# Patient Record
Sex: MALE | Race: WHITE | Employment: FULL TIME | URBAN - METROPOLITAN AREA
[De-identification: names, ages, dates, MRNs, and addresses within clinical notes are randomized per-mention and may not be internally consistent; named-entity substitution may affect disease eponyms.]

---

## 2017-06-09 ENCOUNTER — OFFICE VISIT (OUTPATIENT)
Dept: FAMILY MEDICINE CLINIC | Age: 57
End: 2017-06-09

## 2017-06-09 VITALS
HEART RATE: 76 BPM | SYSTOLIC BLOOD PRESSURE: 120 MMHG | HEIGHT: 71 IN | BODY MASS INDEX: 31.3 KG/M2 | TEMPERATURE: 98.3 F | DIASTOLIC BLOOD PRESSURE: 88 MMHG | WEIGHT: 223.6 LBS

## 2017-06-09 DIAGNOSIS — I10 ESSENTIAL HYPERTENSION: ICD-10-CM

## 2017-06-09 DIAGNOSIS — I10 ESSENTIAL HYPERTENSION: Primary | ICD-10-CM

## 2017-06-09 DIAGNOSIS — R09.89 PULSE IRREGULARITY: ICD-10-CM

## 2017-06-09 LAB
ANION GAP SERPL CALCULATED.3IONS-SCNC: 18 MMOL/L (ref 3–16)
BUN BLDV-MCNC: 9 MG/DL (ref 7–20)
CALCIUM SERPL-MCNC: 8.6 MG/DL (ref 8.3–10.6)
CHLORIDE BLD-SCNC: 107 MMOL/L (ref 99–110)
CO2: 24 MMOL/L (ref 21–32)
CREAT SERPL-MCNC: 1.1 MG/DL (ref 0.9–1.3)
GFR AFRICAN AMERICAN: >60
GFR NON-AFRICAN AMERICAN: >60
GLUCOSE BLD-MCNC: 95 MG/DL (ref 70–99)
HCT VFR BLD CALC: 43 % (ref 40.5–52.5)
HEMOGLOBIN: 14.1 G/DL (ref 13.5–17.5)
MCH RBC QN AUTO: 27.9 PG (ref 26–34)
MCHC RBC AUTO-ENTMCNC: 32.9 G/DL (ref 31–36)
MCV RBC AUTO: 84.9 FL (ref 80–100)
PDW BLD-RTO: 14.5 % (ref 12.4–15.4)
PLATELET # BLD: 236 K/UL (ref 135–450)
PMV BLD AUTO: 10.5 FL (ref 5–10.5)
POTASSIUM SERPL-SCNC: 4.2 MMOL/L (ref 3.5–5.1)
RBC # BLD: 5.07 M/UL (ref 4.2–5.9)
SODIUM BLD-SCNC: 149 MMOL/L (ref 136–145)
WBC # BLD: 7.1 K/UL (ref 4–11)

## 2017-06-09 PROCEDURE — 99211 OFF/OP EST MAY X REQ PHY/QHP: CPT | Performed by: FAMILY MEDICINE

## 2017-06-09 PROCEDURE — 93000 ELECTROCARDIOGRAM COMPLETE: CPT | Performed by: FAMILY MEDICINE

## 2017-06-12 DIAGNOSIS — E87.6 HYPOKALEMIA: Primary | ICD-10-CM

## 2017-07-14 ENCOUNTER — OFFICE VISIT (OUTPATIENT)
Dept: FAMILY MEDICINE CLINIC | Age: 57
End: 2017-07-14

## 2017-07-14 VITALS
TEMPERATURE: 98.3 F | HEIGHT: 71 IN | DIASTOLIC BLOOD PRESSURE: 82 MMHG | BODY MASS INDEX: 31.5 KG/M2 | SYSTOLIC BLOOD PRESSURE: 130 MMHG | WEIGHT: 225 LBS | HEART RATE: 76 BPM

## 2017-07-14 DIAGNOSIS — E87.6 HYPOKALEMIA: Primary | ICD-10-CM

## 2017-07-14 PROCEDURE — 99211 OFF/OP EST MAY X REQ PHY/QHP: CPT | Performed by: FAMILY MEDICINE

## 2017-11-18 RX ORDER — SPIRONOLACTONE 25 MG/1
TABLET ORAL
Qty: 60 TABLET | Refills: 4 | Status: SHIPPED | OUTPATIENT
Start: 2017-11-18 | End: 2018-08-17 | Stop reason: SDUPTHER

## 2018-01-19 ENCOUNTER — OFFICE VISIT (OUTPATIENT)
Dept: FAMILY MEDICINE CLINIC | Age: 58
End: 2018-01-19

## 2018-01-19 VITALS
BODY MASS INDEX: 31.84 KG/M2 | DIASTOLIC BLOOD PRESSURE: 80 MMHG | WEIGHT: 227.4 LBS | TEMPERATURE: 98.2 F | HEART RATE: 72 BPM | HEIGHT: 71 IN | SYSTOLIC BLOOD PRESSURE: 112 MMHG

## 2018-01-19 DIAGNOSIS — E87.6 HYPOKALEMIA: Primary | ICD-10-CM

## 2018-01-19 DIAGNOSIS — I15.2 HYPERTENSION DUE TO ENDOCRINE DISORDER: ICD-10-CM

## 2018-01-19 PROCEDURE — 99999 PR OFFICE/OUTPT VISIT,PROCEDURE ONLY: CPT | Performed by: FAMILY MEDICINE

## 2018-01-19 ASSESSMENT — ENCOUNTER SYMPTOMS
SHORTNESS OF BREATH: 0
NAUSEA: 0
DIARRHEA: 0
ABDOMINAL DISTENTION: 0
VOMITING: 0
BLOOD IN STOOL: 0
CONSTIPATION: 0
ABDOMINAL PAIN: 0
CHEST TIGHTNESS: 0

## 2018-01-19 NOTE — PROGRESS NOTES
headaches. Objective:   Physical Exam   Constitutional: He appears well-developed and well-nourished. No distress. Cardiovascular: Normal rate, regular rhythm and normal heart sounds. Exam reveals no gallop and no friction rub. No murmur heard. Pulmonary/Chest: Effort normal and breath sounds normal. No accessory muscle usage. No tachypnea. No respiratory distress. He has no decreased breath sounds. He has no wheezes. He has no rhonchi. He has no rales. Abdominal: Soft. Normal appearance and bowel sounds are normal. He exhibits no distension, no abdominal bruit and no mass. There is no hepatosplenomegaly. There is no tenderness. There is no guarding. Lymphadenopathy:     He has no cervical adenopathy. Right: No supraclavicular adenopathy present. Left: No supraclavicular adenopathy present. Neurological: He is alert. Skin: Skin is warm, dry and intact. He is not diaphoretic. No cyanosis. No pallor. Nails show no clubbing. Psychiatric: He has a normal mood and affect. Assessment:       1. Hypokalemia  Basic Metabolic Panel   2.  Hypertension due to endocrine disorder  Lipid Panel    Basic Metabolic Panel       He has no insurance  He has seen renal medicine in past  Hx of adrenal adenoma stable  Overall well  He declined to have the hep c screen      Plan:      See in 7 months  Stay with the medication

## 2018-08-17 ENCOUNTER — OFFICE VISIT (OUTPATIENT)
Dept: FAMILY MEDICINE CLINIC | Age: 58
End: 2018-08-17

## 2018-08-17 VITALS
SYSTOLIC BLOOD PRESSURE: 114 MMHG | TEMPERATURE: 98.2 F | WEIGHT: 228.8 LBS | BODY MASS INDEX: 32.03 KG/M2 | HEIGHT: 71 IN | DIASTOLIC BLOOD PRESSURE: 80 MMHG

## 2018-08-17 DIAGNOSIS — I15.2 HYPERTENSION DUE TO ENDOCRINE DISORDER: Primary | ICD-10-CM

## 2018-08-17 DIAGNOSIS — E87.6 HYPOKALEMIA: ICD-10-CM

## 2018-08-17 DIAGNOSIS — D50.0 IRON DEFICIENCY ANEMIA DUE TO CHRONIC BLOOD LOSS: ICD-10-CM

## 2018-08-17 PROCEDURE — 99999 PR OFFICE/OUTPT VISIT,PROCEDURE ONLY: CPT | Performed by: FAMILY MEDICINE

## 2018-08-17 RX ORDER — SPIRONOLACTONE 25 MG/1
25 TABLET ORAL DAILY
Qty: 30 TABLET | Refills: 5 | Status: SHIPPED | OUTPATIENT
Start: 2018-08-17 | End: 2019-03-14 | Stop reason: SDUPTHER

## 2018-08-17 ASSESSMENT — PATIENT HEALTH QUESTIONNAIRE - PHQ9
2. FEELING DOWN, DEPRESSED OR HOPELESS: 0
SUM OF ALL RESPONSES TO PHQ9 QUESTIONS 1 & 2: 0
1. LITTLE INTEREST OR PLEASURE IN DOING THINGS: 0
SUM OF ALL RESPONSES TO PHQ QUESTIONS 1-9: 0
SUM OF ALL RESPONSES TO PHQ QUESTIONS 1-9: 0

## 2018-08-17 NOTE — PATIENT INSTRUCTIONS
do use the iron tablet daily for 6 weeks  Check the blood count in 2 months  Hold the blood donation for the next 4 months  Decrease the donations to 2 times a year  See in 6 months

## 2019-02-21 ENCOUNTER — OFFICE VISIT (OUTPATIENT)
Dept: FAMILY MEDICINE CLINIC | Age: 59
End: 2019-02-21

## 2019-02-21 ENCOUNTER — TELEPHONE (OUTPATIENT)
Dept: FAMILY MEDICINE CLINIC | Age: 59
End: 2019-02-21

## 2019-02-21 VITALS
HEIGHT: 71 IN | HEART RATE: 72 BPM | TEMPERATURE: 98 F | DIASTOLIC BLOOD PRESSURE: 86 MMHG | WEIGHT: 216 LBS | SYSTOLIC BLOOD PRESSURE: 128 MMHG | BODY MASS INDEX: 30.24 KG/M2

## 2019-02-21 DIAGNOSIS — I15.2 HYPERTENSION DUE TO ENDOCRINE DISORDER: ICD-10-CM

## 2019-02-21 DIAGNOSIS — I15.2 HYPERTENSION DUE TO ENDOCRINE DISORDER: Primary | ICD-10-CM

## 2019-02-21 DIAGNOSIS — J01.00 ACUTE NON-RECURRENT MAXILLARY SINUSITIS: ICD-10-CM

## 2019-02-21 LAB
ANION GAP SERPL CALCULATED.3IONS-SCNC: 15 MMOL/L (ref 3–16)
BASOPHILS ABSOLUTE: 0.1 K/UL (ref 0–0.2)
BASOPHILS RELATIVE PERCENT: 1.8 %
BUN BLDV-MCNC: 11 MG/DL (ref 7–20)
CALCIUM SERPL-MCNC: 8.5 MG/DL (ref 8.3–10.6)
CHLORIDE BLD-SCNC: 104 MMOL/L (ref 99–110)
CO2: 23 MMOL/L (ref 21–32)
CREAT SERPL-MCNC: 1.1 MG/DL (ref 0.9–1.3)
EOSINOPHILS ABSOLUTE: 0.5 K/UL (ref 0–0.6)
EOSINOPHILS RELATIVE PERCENT: 7.6 %
GFR AFRICAN AMERICAN: >60
GFR NON-AFRICAN AMERICAN: >60
GLUCOSE BLD-MCNC: 94 MG/DL (ref 70–99)
HCT VFR BLD CALC: 40.4 % (ref 40.5–52.5)
HEMOGLOBIN: 13.6 G/DL (ref 13.5–17.5)
LYMPHOCYTES ABSOLUTE: 1.7 K/UL (ref 1–5.1)
LYMPHOCYTES RELATIVE PERCENT: 25.9 %
MCH RBC QN AUTO: 28.2 PG (ref 26–34)
MCHC RBC AUTO-ENTMCNC: 33.6 G/DL (ref 31–36)
MCV RBC AUTO: 83.8 FL (ref 80–100)
MONOCYTES ABSOLUTE: 0.6 K/UL (ref 0–1.3)
MONOCYTES RELATIVE PERCENT: 8.4 %
NEUTROPHILS ABSOLUTE: 3.7 K/UL (ref 1.7–7.7)
NEUTROPHILS RELATIVE PERCENT: 56.3 %
PDW BLD-RTO: 14.8 % (ref 12.4–15.4)
PLATELET # BLD: 205 K/UL (ref 135–450)
PMV BLD AUTO: 10.7 FL (ref 5–10.5)
POTASSIUM SERPL-SCNC: 3.5 MMOL/L (ref 3.5–5.1)
RBC # BLD: 4.82 M/UL (ref 4.2–5.9)
SODIUM BLD-SCNC: 142 MMOL/L (ref 136–145)
WBC # BLD: 6.6 K/UL (ref 4–11)

## 2019-02-21 PROCEDURE — 99999 PR OFFICE/OUTPT VISIT,PROCEDURE ONLY: CPT | Performed by: FAMILY MEDICINE

## 2019-02-21 RX ORDER — SULFAMETHOXAZOLE AND TRIMETHOPRIM 400; 80 MG/1; MG/1
1 TABLET ORAL 2 TIMES DAILY
Qty: 20 TABLET | Refills: 0 | Status: SHIPPED | OUTPATIENT
Start: 2019-02-21 | End: 2019-03-03

## 2019-02-21 ASSESSMENT — PATIENT HEALTH QUESTIONNAIRE - PHQ9
1. LITTLE INTEREST OR PLEASURE IN DOING THINGS: 0
SUM OF ALL RESPONSES TO PHQ QUESTIONS 1-9: 0
SUM OF ALL RESPONSES TO PHQ QUESTIONS 1-9: 0
2. FEELING DOWN, DEPRESSED OR HOPELESS: 0
SUM OF ALL RESPONSES TO PHQ9 QUESTIONS 1 & 2: 0

## 2019-03-14 RX ORDER — SPIRONOLACTONE 25 MG/1
TABLET ORAL
Qty: 30 TABLET | Refills: 5 | Status: SHIPPED | OUTPATIENT
Start: 2019-03-14 | End: 2019-09-23 | Stop reason: SDUPTHER

## 2019-08-23 ENCOUNTER — OFFICE VISIT (OUTPATIENT)
Dept: FAMILY MEDICINE CLINIC | Age: 59
End: 2019-08-23

## 2019-08-23 VITALS
HEART RATE: 64 BPM | DIASTOLIC BLOOD PRESSURE: 68 MMHG | HEIGHT: 71 IN | WEIGHT: 218 LBS | TEMPERATURE: 98.3 F | BODY MASS INDEX: 30.52 KG/M2 | SYSTOLIC BLOOD PRESSURE: 112 MMHG

## 2019-08-23 DIAGNOSIS — I15.2 HYPERTENSION DUE TO ENDOCRINE DISORDER: Primary | ICD-10-CM

## 2019-08-23 PROCEDURE — 99999 PR OFFICE/OUTPT VISIT,PROCEDURE ONLY: CPT | Performed by: FAMILY MEDICINE

## 2019-08-23 ASSESSMENT — ENCOUNTER SYMPTOMS
DIARRHEA: 0
VOMITING: 0
ABDOMINAL DISTENTION: 0
SHORTNESS OF BREATH: 0
BLOOD IN STOOL: 0
CHEST TIGHTNESS: 0
ABDOMINAL PAIN: 0
NAUSEA: 0
CONSTIPATION: 0

## 2019-08-23 NOTE — PROGRESS NOTES
Physical Exam   Constitutional: He appears well-developed and well-nourished. No distress. Eyes: No scleral icterus. Neck: Neck supple. No thyroid mass and no thyromegaly present. Cardiovascular: Normal rate, regular rhythm and normal heart sounds. Exam reveals no gallop and no friction rub. No murmur heard. Pulmonary/Chest: Effort normal and breath sounds normal. No accessory muscle usage. No tachypnea. No respiratory distress. He has no decreased breath sounds. He has no wheezes. He has no rhonchi. He has no rales. Abdominal: Soft. Normal appearance and bowel sounds are normal. He exhibits no distension, no abdominal bruit, no pulsatile midline mass and no mass. There is no hepatosplenomegaly. There is no tenderness. There is no rigidity and no guarding. Lymphadenopathy:     He has no cervical adenopathy. Right: No supraclavicular adenopathy present. Left: No supraclavicular adenopathy present. Neurological: He is alert. He displays no tremor. Skin: Skin is warm, dry and intact. He is not diaphoretic. No cyanosis. No pallor. Nails show no clubbing. Psychiatric: He has a normal mood and affect. His speech is normal.       Assessment:        Diagnosis Orders   1.  Hypertension due to endocrine disorder  CBC Auto Differential    Comprehensive Metabolic Panel    Lipid Panel      no insurance  Adrenal adenoma stable   Overall stable  He declined to be evaluated for sleep apnea  Reviewed again with him and he again declined prostate and colon screen      Plan:      Stay with the medicine  Continue to watch the diet   See in 6-7 months        Frandy Domingo MD

## 2019-09-23 RX ORDER — SPIRONOLACTONE 25 MG/1
TABLET ORAL
Qty: 30 TABLET | Refills: 5 | Status: SHIPPED | OUTPATIENT
Start: 2019-09-23 | End: 2020-03-30

## 2020-03-18 DIAGNOSIS — I15.2 HYPERTENSION DUE TO ENDOCRINE DISORDER: ICD-10-CM

## 2020-03-18 LAB
A/G RATIO: 1.4 (ref 1.1–2.2)
ALBUMIN SERPL-MCNC: 4.2 G/DL (ref 3.4–5)
ALP BLD-CCNC: 91 U/L (ref 40–129)
ALT SERPL-CCNC: 18 U/L (ref 10–40)
ANION GAP SERPL CALCULATED.3IONS-SCNC: 13 MMOL/L (ref 3–16)
AST SERPL-CCNC: 19 U/L (ref 15–37)
BASOPHILS ABSOLUTE: 0.1 K/UL (ref 0–0.2)
BASOPHILS RELATIVE PERCENT: 1.1 %
BILIRUB SERPL-MCNC: 0.6 MG/DL (ref 0–1)
BUN BLDV-MCNC: 8 MG/DL (ref 7–20)
CALCIUM SERPL-MCNC: 9.1 MG/DL (ref 8.3–10.6)
CHLORIDE BLD-SCNC: 105 MMOL/L (ref 99–110)
CHOLESTEROL, TOTAL: 119 MG/DL (ref 0–199)
CO2: 28 MMOL/L (ref 21–32)
CREAT SERPL-MCNC: 1 MG/DL (ref 0.9–1.3)
EOSINOPHILS ABSOLUTE: 0.1 K/UL (ref 0–0.6)
EOSINOPHILS RELATIVE PERCENT: 0.9 %
GFR AFRICAN AMERICAN: >60
GFR NON-AFRICAN AMERICAN: >60
GLOBULIN: 3 G/DL
GLUCOSE BLD-MCNC: 89 MG/DL (ref 70–99)
HCT VFR BLD CALC: 43.7 % (ref 40.5–52.5)
HDLC SERPL-MCNC: 32 MG/DL (ref 40–60)
HEMOGLOBIN: 14.8 G/DL (ref 13.5–17.5)
LDL CHOLESTEROL CALCULATED: 73 MG/DL
LYMPHOCYTES ABSOLUTE: 1.5 K/UL (ref 1–5.1)
LYMPHOCYTES RELATIVE PERCENT: 20 %
MCH RBC QN AUTO: 28.2 PG (ref 26–34)
MCHC RBC AUTO-ENTMCNC: 33.9 G/DL (ref 31–36)
MCV RBC AUTO: 83.3 FL (ref 80–100)
MONOCYTES ABSOLUTE: 0.6 K/UL (ref 0–1.3)
MONOCYTES RELATIVE PERCENT: 8.2 %
NEUTROPHILS ABSOLUTE: 5.1 K/UL (ref 1.7–7.7)
NEUTROPHILS RELATIVE PERCENT: 69.8 %
PDW BLD-RTO: 15 % (ref 12.4–15.4)
PLATELET # BLD: 192 K/UL (ref 135–450)
PLATELET SLIDE REVIEW: ADEQUATE
PMV BLD AUTO: 10.7 FL (ref 5–10.5)
POTASSIUM SERPL-SCNC: 3.8 MMOL/L (ref 3.5–5.1)
RBC # BLD: 5.24 M/UL (ref 4.2–5.9)
SODIUM BLD-SCNC: 146 MMOL/L (ref 136–145)
TOTAL PROTEIN: 7.2 G/DL (ref 6.4–8.2)
TRIGL SERPL-MCNC: 68 MG/DL (ref 0–150)
VLDLC SERPL CALC-MCNC: 14 MG/DL
WBC # BLD: 7.3 K/UL (ref 4–11)

## 2020-03-23 ENCOUNTER — OFFICE VISIT (OUTPATIENT)
Dept: FAMILY MEDICINE CLINIC | Age: 60
End: 2020-03-23

## 2020-03-23 VITALS
TEMPERATURE: 97.9 F | HEART RATE: 76 BPM | DIASTOLIC BLOOD PRESSURE: 84 MMHG | SYSTOLIC BLOOD PRESSURE: 128 MMHG | WEIGHT: 210 LBS | HEIGHT: 71 IN | BODY MASS INDEX: 29.4 KG/M2

## 2020-03-23 PROCEDURE — 93000 ELECTROCARDIOGRAM COMPLETE: CPT | Performed by: FAMILY MEDICINE

## 2020-03-23 PROCEDURE — 99211 OFF/OP EST MAY X REQ PHY/QHP: CPT | Performed by: FAMILY MEDICINE

## 2020-03-23 ASSESSMENT — PATIENT HEALTH QUESTIONNAIRE - PHQ9
1. LITTLE INTEREST OR PLEASURE IN DOING THINGS: 0
2. FEELING DOWN, DEPRESSED OR HOPELESS: 0
SUM OF ALL RESPONSES TO PHQ QUESTIONS 1-9: 0
SUM OF ALL RESPONSES TO PHQ QUESTIONS 1-9: 0
SUM OF ALL RESPONSES TO PHQ9 QUESTIONS 1 & 2: 0

## 2020-03-23 NOTE — PATIENT INSTRUCTIONS
Continue to use the medicine  If this changes or more severe go to the ER  Call me in about 2 weeks  See in late summer

## 2020-03-23 NOTE — PROGRESS NOTES
Subjective:      Patient ID: Dewayne Melgar is a 62 y.o. male. Patient presents with:  Hypertension    He is ok  Will get occ flutter in chest and some left arm numbness for number of months since the last visit  With or without activity. Every 3 days but has gotten better over last 10 days  He had been taking a lot of caffeine and that seems to have helped  No cp no sob  No gerd no heartburn  No dysphagia no cough    No abdomen pain   On same meds  He is well today    Brother did not have cad. The doctors told them a viral infection of the heart      YOB: 1960    Date of Visit:  3/23/2020     -- Pcn ([facility])     Current Outpatient Medications:  spironolactone (ALDACTONE) 25 MG tablet, TAKE ONE TABLET BY MOUTH DAILY, Disp: 30 tablet, Rfl: 5    No current facility-administered medications for this visit.       ---------------------------               03/23/20                      1338         ---------------------------   BP:          128/84         Site:    Left Upper Arm     Position:     Sitting        Cuff Size:   Large Adult      Pulse:         76           Temp:   97.9 °F (36.6 °C)   TempSrc:      Oral          Weight: 210 lb (95.3 kg)    Height: 5' 11\" (1.803 m)   ---------------------------  Body mass index is 29.29 kg/m². Wt Readings from Last 3 Encounters:  Trying to lose weight   03/23/20 : 210 lb (95.3 kg)  08/23/19 : 218 lb (98.9 kg)  02/21/19 : 216 lb (98 kg)    BP Readings from Last 3 Encounters:  03/23/20 : 128/84  08/23/19 : 112/68  02/21/19 : 128/86        Review of Systems    Objective:   Physical Exam  Constitutional:       General: He is not in acute distress. Appearance: Normal appearance. He is well-developed. He is not ill-appearing or diaphoretic. Cardiovascular:      Rate and Rhythm: Normal rate and regular rhythm. Heart sounds: Normal heart sounds. No murmur. No friction rub. No gallop.        Comments: Pulmonary:      Effort: Pulmonary effort is normal. No tachypnea, accessory muscle usage or respiratory distress. Breath sounds: Normal breath sounds. No decreased breath sounds, wheezing, rhonchi or rales. Abdominal:      General: Bowel sounds are normal. There is no distension or abdominal bruit. Palpations: Abdomen is soft. There is no hepatomegaly, splenomegaly, mass or pulsatile mass. Tenderness: There is no abdominal tenderness. There is no guarding. Lymphadenopathy:      Cervical: No cervical adenopathy. Upper Body:      Right upper body: No supraclavicular adenopathy. Left upper body: No supraclavicular adenopathy. Skin:     General: Skin is warm and dry. Coloration: Skin is not pale. Nails: There is no clubbing. Neurological:      Mental Status: He is alert. Assessment:        Diagnosis Orders   1. Hypertension due to endocrine disorder  EKG 12 lead   2. Chest pain, unspecified type  EKG 12 lead       He is well now  ekg ok no ischemia and no change from earlier  sr  Atypical chest pain.  He declined to have a cardiac referral and check up      Plan:      Continue to use the medicine  If this changes or more severe go to the ER  Call me in about 2 weeks  See in late summer         Sharda Chong MD

## 2020-03-30 RX ORDER — SPIRONOLACTONE 25 MG/1
TABLET ORAL
Qty: 30 TABLET | Refills: 4 | Status: SHIPPED | OUTPATIENT
Start: 2020-03-30 | End: 2020-09-11

## 2020-08-24 ENCOUNTER — OFFICE VISIT (OUTPATIENT)
Dept: FAMILY MEDICINE CLINIC | Age: 60
End: 2020-08-24

## 2020-08-24 VITALS
SYSTOLIC BLOOD PRESSURE: 132 MMHG | TEMPERATURE: 98.1 F | DIASTOLIC BLOOD PRESSURE: 86 MMHG | BODY MASS INDEX: 29.26 KG/M2 | HEART RATE: 60 BPM | HEIGHT: 71 IN | WEIGHT: 209 LBS

## 2020-08-24 PROCEDURE — 99211 OFF/OP EST MAY X REQ PHY/QHP: CPT | Performed by: FAMILY MEDICINE

## 2020-08-24 RX ORDER — ASPIRIN 81 MG/1
81 TABLET ORAL DAILY
Status: ON HOLD | COMMUNITY
End: 2021-11-14 | Stop reason: ALTCHOICE

## 2020-08-24 NOTE — PATIENT INSTRUCTIONS
Continue the medicine  If the symptoms do not clear call us   Keep the house ventilated  Carefully clean the areas of mold   See in 12 months

## 2020-08-24 NOTE — PROGRESS NOTES
Subjective:      Patient ID: Yocasta Pastrana is a 62 y.o. male. Patient presents with:  6 Month Follow-Up: hypertension    He is well and really no c/o  Feels well  occ cough or wheeze about 3 week  Seems to be getting better  He thinks it could be allergy. He does have some mold issues in the home    No fever no sob no cp  No abdomen pain   bm and urine doing well no sx  No ha    YOB: 1960    Date of Visit:  8/24/2020     -- Pcn ([facility])     Current Outpatient Medications:  aspirin 81 MG EC tablet, Take 81 mg by mouth daily, Disp: , Rfl:   spironolactone (ALDACTONE) 25 MG tablet, TAKE ONE TABLET BY MOUTH DAILY, Disp: 30 tablet, Rfl: 4    No current facility-administered medications for this visit.       ---------------------------               08/24/20                      1546         ---------------------------   BP:          132/86         Site:    Left Upper Arm     Position:     Sitting        Cuff Size:   Large Adult      Pulse:         60           Temp:   98.1 °F (36.7 °C)   TempSrc:    Temporal        Weight: 209 lb (94.8 kg)    Height: 5' 11\" (1.803 m)   ---------------------------  Body mass index is 29.15 kg/m². Wt Readings from Last 3 Encounters:  08/24/20 : 209 lb (94.8 kg)  03/23/20 : 210 lb (95.3 kg)  08/23/19 : 218 lb (98.9 kg)    BP Readings from Last 3 Encounters:  08/24/20 : 132/86  03/23/20 : 128/84  08/23/19 : 112/68          Review of Systems    Objective:   Physical Exam  Constitutional:       General: He is not in acute distress. Appearance: Normal appearance. He is well-developed. He is not ill-appearing or diaphoretic. HENT:      Head: Normocephalic and atraumatic. Mouth/Throat:      Lips: Pink. Mouth: Mucous membranes are moist.      Pharynx: Oropharynx is clear. Uvula midline. Eyes:      General: Lids are normal.      Extraocular Movements: Extraocular movements intact.    Neck:      Musculoskeletal: Neck supple. Thyroid: No thyroid mass or thyromegaly. Cardiovascular:      Rate and Rhythm: Normal rate and regular rhythm. Heart sounds: Normal heart sounds. No murmur. No friction rub. No gallop. Comments:     Pulmonary:      Effort: Pulmonary effort is normal. No tachypnea, accessory muscle usage or respiratory distress. Breath sounds: Normal breath sounds. No decreased breath sounds, wheezing, rhonchi or rales. Abdominal:      General: Bowel sounds are normal. There is no distension or abdominal bruit. Palpations: Abdomen is soft. There is no hepatomegaly, splenomegaly, mass or pulsatile mass. Tenderness: There is no abdominal tenderness. There is no guarding. Lymphadenopathy:      Cervical: No cervical adenopathy. Upper Body:      Right upper body: No supraclavicular adenopathy. Left upper body: No supraclavicular adenopathy. Skin:     General: Skin is warm and dry. Coloration: Skin is not pale. Nails: There is no clubbing. Neurological:      Mental Status: He is alert. Assessment:        Diagnosis Orders   1.  Hypertension due to endocrine disorder         He did not want to come in sooner       Plan:      Continue the medicine  If the symptoms do not clear call us   Keep the house ventilated  Carefully clean the areas of mold   See in 12 months         Ash Poole MD

## 2020-09-11 RX ORDER — SPIRONOLACTONE 25 MG/1
TABLET ORAL
Qty: 30 TABLET | Refills: 5 | Status: SHIPPED | OUTPATIENT
Start: 2020-09-11 | End: 2021-03-22

## 2021-03-22 RX ORDER — SPIRONOLACTONE 25 MG/1
TABLET ORAL
Qty: 30 TABLET | Refills: 4 | Status: SHIPPED | OUTPATIENT
Start: 2021-03-22 | End: 2021-06-14 | Stop reason: SDUPTHER

## 2021-06-14 ENCOUNTER — OFFICE VISIT (OUTPATIENT)
Dept: FAMILY MEDICINE CLINIC | Age: 61
End: 2021-06-14

## 2021-06-14 VITALS
SYSTOLIC BLOOD PRESSURE: 138 MMHG | DIASTOLIC BLOOD PRESSURE: 88 MMHG | HEART RATE: 88 BPM | WEIGHT: 210 LBS | HEIGHT: 71 IN | BODY MASS INDEX: 29.4 KG/M2 | TEMPERATURE: 97.8 F

## 2021-06-14 DIAGNOSIS — I15.2 HYPERTENSION DUE TO ENDOCRINE DISORDER: Primary | ICD-10-CM

## 2021-06-14 DIAGNOSIS — I15.2 HYPERTENSION DUE TO ENDOCRINE DISORDER: ICD-10-CM

## 2021-06-14 LAB
ANION GAP SERPL CALCULATED.3IONS-SCNC: 9 MMOL/L (ref 3–16)
BUN BLDV-MCNC: 9 MG/DL (ref 7–20)
CALCIUM SERPL-MCNC: 8.8 MG/DL (ref 8.3–10.6)
CHLORIDE BLD-SCNC: 108 MMOL/L (ref 99–110)
CO2: 29 MMOL/L (ref 21–32)
CREAT SERPL-MCNC: 1.1 MG/DL (ref 0.8–1.3)
GFR AFRICAN AMERICAN: >60
GFR NON-AFRICAN AMERICAN: >60
GLUCOSE BLD-MCNC: 129 MG/DL (ref 70–99)
POTASSIUM SERPL-SCNC: 3.2 MMOL/L (ref 3.5–5.1)
SODIUM BLD-SCNC: 146 MMOL/L (ref 136–145)

## 2021-06-14 PROCEDURE — 99999 PR OFFICE/OUTPT VISIT,PROCEDURE ONLY: CPT | Performed by: FAMILY MEDICINE

## 2021-06-14 RX ORDER — SPIRONOLACTONE 25 MG/1
25 TABLET ORAL DAILY
Qty: 90 TABLET | Refills: 2 | Status: ON HOLD
Start: 2021-06-14 | End: 2021-11-22 | Stop reason: HOSPADM

## 2021-06-14 RX ORDER — SPIRONOLACTONE 25 MG/1
25 TABLET ORAL DAILY
Qty: 30 TABLET | Refills: 5 | Status: SHIPPED | OUTPATIENT
Start: 2021-06-14 | End: 2021-06-14

## 2021-06-14 SDOH — ECONOMIC STABILITY: FOOD INSECURITY: WITHIN THE PAST 12 MONTHS, YOU WORRIED THAT YOUR FOOD WOULD RUN OUT BEFORE YOU GOT MONEY TO BUY MORE.: NEVER TRUE

## 2021-06-14 SDOH — ECONOMIC STABILITY: FOOD INSECURITY: WITHIN THE PAST 12 MONTHS, THE FOOD YOU BOUGHT JUST DIDN'T LAST AND YOU DIDN'T HAVE MONEY TO GET MORE.: NEVER TRUE

## 2021-06-14 ASSESSMENT — SOCIAL DETERMINANTS OF HEALTH (SDOH): HOW HARD IS IT FOR YOU TO PAY FOR THE VERY BASICS LIKE FOOD, HOUSING, MEDICAL CARE, AND HEATING?: NOT HARD AT ALL

## 2021-06-14 ASSESSMENT — ENCOUNTER SYMPTOMS
COUGH: 0
DIARRHEA: 0
CHEST TIGHTNESS: 0
ABDOMINAL PAIN: 0
CONSTIPATION: 0
ABDOMINAL DISTENTION: 0
BLOOD IN STOOL: 0
VOMITING: 0
NAUSEA: 0
SHORTNESS OF BREATH: 0

## 2021-06-14 ASSESSMENT — PATIENT HEALTH QUESTIONNAIRE - PHQ9
SUM OF ALL RESPONSES TO PHQ QUESTIONS 1-9: 0
SUM OF ALL RESPONSES TO PHQ9 QUESTIONS 1 & 2: 0
2. FEELING DOWN, DEPRESSED OR HOPELESS: 0
1. LITTLE INTEREST OR PLEASURE IN DOING THINGS: 0

## 2021-06-14 NOTE — PROGRESS NOTES
Subjective:      Patient ID: Zeke Emerson is a 62 y.o. male. Chief Complaint   Patient presents with   Zenaida Johnson     hypertension - pt is not fasting        Patient presents with:  Check-Up: hypertension - pt is not fasting    He is well and here for the above  He has no c/o  meds the same    YOB: 1960    Date of Visit:  6/14/2021     -- Pcn ([facility])     Current Outpatient Medications:  spironolactone (ALDACTONE) 25 MG tablet, TAKE ONE TABLET BY MOUTH DAILY, Disp: 30 tablet, Rfl: 4  aspirin 81 MG EC tablet, Take 81 mg by mouth daily (Patient not taking: Reported on 6/14/2021), Disp: , Rfl:     No current facility-administered medications for this visit.      ---------------------------               06/14/21                      0929         ---------------------------   BP:          138/88         Site:    Left Upper Arm     Position:     Sitting        Cuff Size:   Large Adult      Pulse:         88           Temp:   97.8 °F (36.6 °C)   TempSrc:    Temporal        Weight: 210 lb (95.3 kg)    Height: 5' 11\" (1.803 m)   ---------------------------  Body mass index is 29.29 kg/m². Wt Readings from Last 3 Encounters:  06/14/21 : 210 lb (95.3 kg)  08/24/20 : 209 lb (94.8 kg)  03/23/20 : 210 lb (95.3 kg)    BP Readings from Last 3 Encounters:  06/14/21 : 138/88  08/24/20 : 132/86  03/23/20 : 128/84          Review of Systems   Constitutional: Negative for appetite change, chills, fever and unexpected weight change. Respiratory: Negative for cough, chest tightness and shortness of breath. Cardiovascular: Negative for chest pain, palpitations and leg swelling. Gastrointestinal: Negative for abdominal distention, abdominal pain, blood in stool, constipation, diarrhea, nausea and vomiting. Genitourinary: Negative for difficulty urinating, dysuria and hematuria. Neurological: Negative for headaches.        Objective:   Physical Exam Constitutional:       General: He is not in acute distress. Appearance: Normal appearance. He is well-developed. He is not ill-appearing or diaphoretic. Eyes:      General: No scleral icterus. Neck:      Thyroid: No thyroid mass or thyromegaly. Cardiovascular:      Rate and Rhythm: Normal rate and regular rhythm. Heart sounds: Normal heart sounds. No murmur heard. No friction rub. No gallop. Pulmonary:      Effort: Pulmonary effort is normal. No tachypnea, accessory muscle usage or respiratory distress. Breath sounds: Normal breath sounds. No decreased breath sounds, wheezing, rhonchi or rales. Musculoskeletal:      Cervical back: Neck supple. Lymphadenopathy:      Cervical: No cervical adenopathy. Upper Body:      Right upper body: No supraclavicular adenopathy. Left upper body: No supraclavicular adenopathy. Skin:     General: Skin is warm and dry. Coloration: Skin is not pale. Neurological:      Mental Status: He is alert. Assessment:       Diagnosis Orders   1.  Hypertension due to endocrine disorder  Basic Metabolic Panel     Orders Placed This Encounter   Medications    DISCONTD: spironolactone (ALDACTONE) 25 MG tablet     Sig: Take 1 tablet by mouth daily     Dispense:  30 tablet     Refill:  5    spironolactone (ALDACTONE) 25 MG tablet     Sig: Take 1 tablet by mouth daily     Dispense:  90 tablet     Refill:  2     Corrected amount     Printed off [employer] coupon for him  He has no insurance       Plan:      Continue the medicine  See in a year        Wilma Patrick MD

## 2021-06-16 DIAGNOSIS — E87.6 LOW BLOOD POTASSIUM: Primary | ICD-10-CM

## 2021-06-16 RX ORDER — POTASSIUM CHLORIDE 750 MG/1
10 TABLET, EXTENDED RELEASE ORAL DAILY
Qty: 10 TABLET | Refills: 0 | Status: ON HOLD | OUTPATIENT
Start: 2021-06-16 | End: 2021-11-14

## 2021-06-29 DIAGNOSIS — E87.6 LOW BLOOD POTASSIUM: ICD-10-CM

## 2021-06-29 LAB — POTASSIUM SERPL-SCNC: 4.3 MMOL/L (ref 3.5–5.1)

## 2021-07-13 ENCOUNTER — OFFICE VISIT (OUTPATIENT)
Dept: FAMILY MEDICINE CLINIC | Age: 61
End: 2021-07-13

## 2021-07-13 VITALS
HEIGHT: 71 IN | BODY MASS INDEX: 30.8 KG/M2 | WEIGHT: 220 LBS | SYSTOLIC BLOOD PRESSURE: 128 MMHG | TEMPERATURE: 99.9 F | DIASTOLIC BLOOD PRESSURE: 82 MMHG

## 2021-07-13 DIAGNOSIS — T50.905A ADVERSE EFFECT OF DRUG, INITIAL ENCOUNTER: Primary | ICD-10-CM

## 2021-07-13 PROCEDURE — 99999 PR OFFICE/OUTPT VISIT,PROCEDURE ONLY: CPT | Performed by: FAMILY MEDICINE

## 2021-07-13 RX ORDER — METHYLPREDNISOLONE 4 MG/1
TABLET ORAL
Qty: 1 KIT | Refills: 0 | Status: SHIPPED | OUTPATIENT
Start: 2021-07-13 | End: 2021-07-19

## 2021-07-13 NOTE — PROGRESS NOTES
Subjective:      Patient ID: Berline Krabbe is a 62 y.o. male. Chief Complaint   Patient presents with    Rash     itchy red rash all over body x 5 days - had tooth pulled - was taking Amox        Patient presents with:  Rash: itchy red rash all over body x 5 days - had tooth pulled - was taking Amox    Reaction to pcn with rash  Only took one tablet 7 hours developed rash in groin and thighs  Rash slowly worse  Antibiotic changed to clindamycin. Started Friday   Itch     YOB: 1960    Date of Visit:  7/13/2021     -- Pcn ([facility])     Current Outpatient Medications:  spironolactone (ALDACTONE) 25 MG tablet, Take 1 tablet by mouth daily, Disp: 90 tablet, Rfl: 2  aspirin 81 MG EC tablet, Take 81 mg by mouth daily , Disp: , Rfl:   potassium chloride (KLOR-CON M) 10 MEQ extended release tablet, Take 1 tablet by mouth daily for 10 days, Disp: 10 tablet, Rfl: 0    No current facility-administered medications for this visit.      ---------------------------               07/13/21                      1638         ---------------------------   BP:          128/82         Site:    Left Upper Arm     Position:     Sitting        Cuff Size:   Large Adult      Temp:   99.9 °F (37.7 °C)   TempSrc:    Temporal        Weight: 220 lb (99.8 kg)    Height: 5' 11\" (1.803 m)   ---------------------------  Body mass index is 30.68 kg/m². Wt Readings from Last 3 Encounters:  07/13/21 : 220 lb (99.8 kg)  06/14/21 : 210 lb (95.3 kg)  08/24/20 : 209 lb (94.8 kg)    BP Readings from Last 3 Encounters:  07/13/21 : 128/82  06/14/21 : 138/88  08/24/20 : 132/86        Review of Systems    Objective:   Physical Exam  Constitutional:       General: He is not in acute distress. Appearance: Normal appearance. He is not ill-appearing. Pulmonary:      Effort: Pulmonary effort is normal.      Breath sounds: Normal breath sounds. No decreased breath sounds, wheezing, rhonchi or rales. Skin:     General: Skin is warm and dry. Coloration: Skin is not pale. Comments: Confluent and several isolated red maculo papular rash on trunk,  proximal thighs,  and also arms and hands face spared   Neurological:      Mental Status: He is alert. Assessment:       Diagnosis Orders   1. Adverse effect of drug, initial encounter       Orders Placed This Encounter   Medications    methylPREDNISolone (MEDROL DOSEPACK) 4 MG tablet     Sig: Take by mouth.      Dispense:  1 kit     Refill:  0       Reaction to pcn /amoxicillin       Plan:      Take benadryl 25 mg every 6-8 hours  Take the steroid   With food  If worse to the ER   Take fluids          Radha Ulloa MD

## 2021-07-17 ENCOUNTER — HOSPITAL ENCOUNTER (EMERGENCY)
Age: 61
Discharge: HOME | End: 2021-07-17
Attending: EMERGENCY MEDICINE

## 2021-07-17 ENCOUNTER — APPOINTMENT (OUTPATIENT)
Dept: GENERAL RADIOLOGY | Age: 61
End: 2021-07-17

## 2021-07-17 VITALS
HEIGHT: 71 IN | HEART RATE: 102 BPM | DIASTOLIC BLOOD PRESSURE: 86 MMHG | BODY MASS INDEX: 30.37 KG/M2 | OXYGEN SATURATION: 96 % | TEMPERATURE: 99.4 F | RESPIRATION RATE: 24 BRPM | SYSTOLIC BLOOD PRESSURE: 148 MMHG | WEIGHT: 216.93 LBS

## 2021-07-17 DIAGNOSIS — L27.0 DRUG ERUPTION: Primary | ICD-10-CM

## 2021-07-17 LAB
ANION GAP SERPL CALCULATED.3IONS-SCNC: 10 MMOL/L (ref 3–16)
BASOPHILS ABSOLUTE: 0 K/UL (ref 0–0.2)
BASOPHILS RELATIVE PERCENT: 0 %
BILIRUBIN URINE: NEGATIVE
BLOOD, URINE: NEGATIVE
BUN BLDV-MCNC: 8 MG/DL (ref 7–20)
C-REACTIVE PROTEIN: 48 MG/L (ref 0–5.1)
CALCIUM SERPL-MCNC: 8.6 MG/DL (ref 8.3–10.6)
CHLORIDE BLD-SCNC: 102 MMOL/L (ref 99–110)
CLARITY: CLEAR
CO2: 26 MMOL/L (ref 21–32)
COLOR: YELLOW
CREAT SERPL-MCNC: 1.1 MG/DL (ref 0.8–1.3)
EOSINOPHILS ABSOLUTE: 0.3 K/UL (ref 0–0.6)
EOSINOPHILS RELATIVE PERCENT: 2 %
GFR AFRICAN AMERICAN: >60
GFR NON-AFRICAN AMERICAN: >60
GLUCOSE BLD-MCNC: 82 MG/DL (ref 70–99)
GLUCOSE URINE: NEGATIVE MG/DL
HCT VFR BLD CALC: 43.4 % (ref 40.5–52.5)
HEMATOLOGY PATH CONSULT: YES
HEMOGLOBIN: 14.4 G/DL (ref 13.5–17.5)
KETONES, URINE: NEGATIVE MG/DL
LEUKOCYTE ESTERASE, URINE: NEGATIVE
LYMPHOCYTES ABSOLUTE: 0.8 K/UL (ref 1–5.1)
LYMPHOCYTES RELATIVE PERCENT: 5 %
MCH RBC QN AUTO: 28.5 PG (ref 26–34)
MCHC RBC AUTO-ENTMCNC: 33.2 G/DL (ref 31–36)
MCV RBC AUTO: 85.9 FL (ref 80–100)
MICROSCOPIC EXAMINATION: NORMAL
MONOCYTES ABSOLUTE: 2.1 K/UL (ref 0–1.3)
MONOCYTES RELATIVE PERCENT: 14 %
NEUTROPHILS ABSOLUTE: 12 K/UL (ref 1.7–7.7)
NEUTROPHILS RELATIVE PERCENT: 79 %
NITRITE, URINE: NEGATIVE
PDW BLD-RTO: 14.6 % (ref 12.4–15.4)
PH UA: 6 (ref 5–8)
PLATELET # BLD: 336 K/UL (ref 135–450)
PMV BLD AUTO: 9 FL (ref 5–10.5)
POTASSIUM REFLEX MAGNESIUM: 3.6 MMOL/L (ref 3.5–5.1)
PROTEIN UA: NEGATIVE MG/DL
RBC # BLD: 5.05 M/UL (ref 4.2–5.9)
RBC # BLD: NORMAL 10*6/UL
SEDIMENTATION RATE, ERYTHROCYTE: 18 MM/HR (ref 0–20)
SODIUM BLD-SCNC: 138 MMOL/L (ref 136–145)
SPECIFIC GRAVITY UA: 1.01 (ref 1–1.03)
URINE REFLEX TO CULTURE: NORMAL
URINE TYPE: NORMAL
UROBILINOGEN, URINE: 0.2 E.U./DL
WBC # BLD: 15.2 K/UL (ref 4–11)

## 2021-07-17 PROCEDURE — 85025 COMPLETE CBC W/AUTO DIFF WBC: CPT

## 2021-07-17 PROCEDURE — 81003 URINALYSIS AUTO W/O SCOPE: CPT

## 2021-07-17 PROCEDURE — 2580000003 HC RX 258: Performed by: PHYSICIAN ASSISTANT

## 2021-07-17 PROCEDURE — 87040 BLOOD CULTURE FOR BACTERIA: CPT

## 2021-07-17 PROCEDURE — 80048 BASIC METABOLIC PNL TOTAL CA: CPT

## 2021-07-17 PROCEDURE — 85652 RBC SED RATE AUTOMATED: CPT

## 2021-07-17 PROCEDURE — 99283 EMERGENCY DEPT VISIT LOW MDM: CPT

## 2021-07-17 PROCEDURE — 71045 X-RAY EXAM CHEST 1 VIEW: CPT

## 2021-07-17 PROCEDURE — 86140 C-REACTIVE PROTEIN: CPT

## 2021-07-17 RX ORDER — PREDNISONE 10 MG/1
TABLET ORAL
Qty: 26 TABLET | Refills: 0 | Status: ON HOLD | OUTPATIENT
Start: 2021-07-17 | End: 2021-11-14

## 2021-07-17 RX ORDER — 0.9 % SODIUM CHLORIDE 0.9 %
1000 INTRAVENOUS SOLUTION INTRAVENOUS ONCE
Status: COMPLETED | OUTPATIENT
Start: 2021-07-17 | End: 2021-07-17

## 2021-07-17 RX ORDER — TRIAMCINOLONE ACETONIDE 1 MG/G
CREAM TOPICAL 2 TIMES DAILY
Qty: 60 G | Refills: 0 | Status: ON HOLD | OUTPATIENT
Start: 2021-07-17 | End: 2021-11-14

## 2021-07-17 RX ADMIN — SODIUM CHLORIDE 1000 ML: 9 INJECTION, SOLUTION INTRAVENOUS at 17:24

## 2021-07-17 NOTE — ED PROVIDER NOTES
Attending Supervisory Note/Shared Visit   I have personally performed a face to face diagnostic evaluation on this patient. I have reviewed the mid-levels findings and agree. History and Exam by me shows alert white male in no acute distress. He took amoxicillin about a week ago and developed a pruritic rash. He was started on prednisone by his primary care physician. He states he does not really seem to be improving, he has had some areas of increasing redness. The rash initially started on his left thigh is a blistered area. HEENT: No significant facial edema. There is some mild diffuse erythema of the face including the forehead. No obvious angioedema of the lips. No edema of the oropharynx/soft palate/uvula. Neck: Supple without adenopathy. Heart: Regular rate and rhythm. No murmurs or gallops noted. Lungs: Breath sounds equal bilaterally and clear. Skin: Erythema to the face. Mild erythema of the back. Erythematous macular rash as pictured below involving the lower extremities including the ankle and dorsal feet. There is no involvement of the palms. There are some minimal erythema of the soles bilaterally, but no discrete rash. There is a dry area in his left thigh with some dry flaking skin. No other bullous areas. No petechiae purpura. Lab reviewed. H&H is normal.  White blood cell count 15,200 with 79 neutrophils and 5 lymphs. Platelet count is 380,639. Renal profile is normal.  Sed rate is 18. CRP is 48. Urinalysis is unremarkable    Chest x-ray: No acute cardiopulmonary abnormality. This patient presented with a rash that was apparently precipitated by taking amoxicillin. The rash was initially pruritic. He has been on a course of prednisone. The rash has progressed as noted above. He has 1 area that has some dry scaling. There are no bullous areas. No sloughing of skin otherwise. There are no petechiae or purpura.   His white blood cell count is somewhat elevated, but this could be due to the steroids that he was just recently on. None of the areas look to be secondarily infected. He has no mucosal involvement. He has some mild erythematous conjunctiva. I do not think he has Pike-Neto syndrome. I do not think this is TENS. We consulted dermatology. Per this discussion he will be placed on a longer steroid taper along with topical steroids and close dermatology follow-up this week. Test results, diagnosis, and treatment plan were discussed with the patient. He understands the treatment plan and follow-up as discussed.       (Please note that portions of this note were completed with a voice recognition program.  Efforts were made to edit the dictations but occasionally words are mis-transcribed.)    Ursula Jimenez MD  Attending Emergency Physician        Lucas Jeffrey MD  07/17/21 2003

## 2021-07-17 NOTE — ED NOTES
Bed: B-08  Expected date:   Expected time:   Means of arrival:   Comments:  Dillon Hardy RN  07/17/21 1527

## 2021-07-17 NOTE — ED NOTES
Pt states that a week ago Thursday he was given PO amoxicillin and about 6 hours after he noticed a rash. He started to take benadryl and a steroid per his PCP. His PO ATB was switched to Clindamycin. HE states that he has not had any improvement of his symptoms. Pt states that the itch does worsen at night.    States he occasionally does get SOB but also again states this is at night      Estefania Jay RN  07/17/21 [id number]

## 2021-07-18 NOTE — ED PROVIDER NOTES
[address]       Pt Name: Majo Hermosillo  MRN: [id number]  [place] 1960  Date of evaluation: 7/17/2021  Provider: ROSELIA Estrella    This patient was also seen and evaluated by the attending physician Clayton Osuna MD.      [address] [place] [address]       Chief Complaint   Patient presents with    Allergic Reaction     pt states that he took amoxicillin on Thursday around 5PM for a dental procedure. pt. states that he had an allergic reaction to it causing redness all over abd along with blisters. pt. states that he was put on a steroid after abx reaction and is still not getting any better from the allergic reaction. HISTORY OF PRESENT ILLNESS  (Location/Symptom, Timing/Onset, Context/Setting, Quality, Duration, Modifying Factors, Severity.)   Majo Hermosillo is a 62 y.o. male who presents to the emergency department with complaint of severe rash. He was advised to come to the ED by his primary care physician. Patient says he has been told since childhood that he has a penicillin allergy but he does not know why and does not recall ever having a bad reaction, so he avoided penicillin all his life. This was until he recently had a dental infection and was and was prescribed amoxicillin, which he took 1 dose of a little over a week ago. He says that within a few hours of doing this he began to develop a rash on his groin area, red and itchy, not involving the scrotum or penis, but including a blister on the left upper leg. He says that the blister burst and over a matter of days this rash improved, but he started developing a rash elsewhere, including the legs, arms, torso. He was prescribed a course of antibiotics by his primary care doctor, but symptoms only worsen. He says that now he has redness in his face and in his left eye along with the rash elsewhere. Symptoms in the groin have improved, but it says all the rash keeps migrating into new areas. He denies any significant swelling. Denies shortness of breath. Denies any vision changes. Denies fever. Denies any prior history of severe allergic reaction. He says his rash is often very itchy but is not painful. No other complaints. Nursing Notes were reviewed and I agree. REVIEW OF SYSTEMS    (2-9 systems for level 4, 10 or more for level 5)     Constitutional:  Negative for fever, chills, unexpected weight change. HENT:  Negative for congestion, rhinorrhea, sneezing, sore throat, trouble swallowing. Eyes: Positive for red eye on the left. Negative for pain or visual disturbance. Respiratory:  Negative for cough, shortness of breath, wheezing, stridor. Cardiovascular:  Negative for chest pain, palpitations, leg swelling. Gastrointestinal:  Negative for nausea, vomiting, abdominal pain, diarrhea, constipation, blood in stool. Genitourinary:  Negative for dysuria, hematuria, flank pain, groin pain. Musculoskeletal:  Negative for myalgias, arthralgias, neck pain or stiffness. Skin: Positive for diffuse rash to the arms, legs, torso, with redness to the skin of the face. Neurological:  Negative for dizziness, seizures, syncope, focal weakness, numbness, headache. Except as noted above the remainder of the review of systems was reviewed and negative. PAST MEDICAL HISTORY   History reviewed. No pertinent past medical history.     SURGICAL HISTORY           Procedure Laterality Date    TONSILLECTOMY      as a child       CURRENT MEDICATIONS       Discharge Medication List as of 7/17/2021  8:13 PM      CONTINUE these medications which have NOT CHANGED    Details   methylPREDNISolone (MEDROL DOSEPACK) 4 MG tablet Take by mouth., Disp-1 kit, R-0Normal      potassium chloride (KLOR-CON M) 10 MEQ extended release tablet Take 1 tablet by mouth daily for 10 days, Disp-10 tablet, R-0Normal      spironolactone (ALDACTONE) 25 MG tablet Take 1 tablet by mouth daily, Disp-90 tablet, R-2Corrected amountNormal      aspirin 81 MG EC tablet Take 81 mg by mouth daily Historical Med             ALLERGIES     Amoxicillin and Pcn [penicillins]    FAMILY HISTORY           Problem Relation Age of Onset    Diabetes Mother     High Blood Pressure Mother     Heart Disease Brother      Family Status   Relation Name Status    Mother  (Not Specified)    Brother  (Not Specified)        SOCIAL HISTORY      reports that he has never smoked. He has never used smokeless tobacco. He reports previous drug use. He reports that he does not drink alcohol. PHYSICAL EXAM    (up to 7 for level 4, 8 or more for level 5)     ED Triage Vitals [07/17/21 1440]   BP Temp Temp Source Pulse Resp SpO2 Height Weight   (!) 179/81 99.4 °F (37.4 °C) Oral 87 18 98 % 5' 11\" (1.803 m) 216 lb 14.9 oz (98.4 kg)       Constitutional:  Appearing well-developed and well-nourished. No distress. HENT:  Normocephalic and atraumatic. Left conjunctiva mildly injected, right conjunctiva normal.  EOM normal. PERRLA. Neck:  Normal range of motion. No tracheal deviation. Cardiovascular:  Normal rate, regular rhythm, normal heart sounds, intact distal pulses. Pulmonary/Chest:  Effort and breath sounds normal. No respiratory distress, wheezes or rales. Abdominal:  Soft. Bowel sounds normal. No tenderness, distension, mass, rebound or guarding. Musculoskeletal:  Normal range of motion. No edema exhibited. Neurological:  Alert and oriented to person, place, and time. No cranial nerve deficit observed. Skin: Erythema noted diffusely to the face, negative for swelling. There is a patchy erythematous rash diffusely over the arms and legs, less so on the chest and neck, sparing the palms and soles, not involving the back or buttocks. Very mild erythema noted to the skin of the groin area. Negative for vesicles or sloughing of the skin. Not diaphoretic. Psychiatric:  Normal mood, affect, behavior, judgment, thought content. DIAGNOSTIC RESULTS     When ordered, EKGs are interpreted by the ED physician in the absence of a cardiologist. Please the physician's note for interpretation of EKG. RADIOLOGY:     Interpretation per the Radiologist below, if available at the time of this note:    XR CHEST PORTABLE   Final Result   Normal appearing chest.  No acute abnormality identified. LABS:  Labs Reviewed   CBC WITH AUTO DIFFERENTIAL - Abnormal; Notable for the following components:       Result Value    WBC 15.2 (*)     Neutrophils Absolute 12.0 (*)     Lymphocytes Absolute 0.8 (*)     Monocytes Absolute 2.1 (*)     All other components within normal limits    Narrative:     Performed at:  [address] [employer]   Phone [phone]   C-REACTIVE PROTEIN - Abnormal; Notable for the following components:    CRP 48.0 (*)     All other components within normal limits    Narrative:     Performed at:  [address] [place] [address]   Phone [phone]   CULTURE, BLOOD 1   CULTURE, BLOOD 2   BASIC METABOLIC PANEL W/ REFLEX TO MG FOR LOW K    Narrative:     Performed at:  [address] [facility] [place] [address]   Phone [phone]   SEDIMENTATION RATE    Narrative:     Performed at:  [address] [place] [address]   Phone [phone]   URINE RT REFLEX TO CULTURE    Narrative:     Performed at:  [address] [employer]   Phone [phone]       All other labs were within normal range or not returned as of this dictation.     EMERGENCY DEPARTMENT COURSE and DIFFERENTIAL DIAGNOSIS/MDM:   Vitals:    Vitals:    07/17/21 1846 07/17/21 1930 07/17/21 2000 07/17/21 2015   BP: (!) 157/97 (!) 166/93 (!) 142/93 (!) 148/86   Pulse: 102 100 110 102   Resp: 27 23 21 24   Temp:       TempSrc:       SpO2: 98% 94% 98% 96%   Weight:       Height:           The patient's condition in the ED was stable, the patient was afebrile and nontoxic in appearance, and the patient's physical exam did show a diffuse rash, but there was no mucosal involvement, no sloughing of the skin, no significant pain. Based on this, suspicion for Robison-Neto syndrome or TENS and was low. Exam suggests a drug eruption, however. No signs of respiratory distress, no breathing complaints. Patient's laboratory work-up was notable for serum white blood cell count of 15.2, the patient has recently been on steroids, and an elevated CRP at 48. I consulted the dermatology NP on-call, and she agreed that the patient did not need hospital admission at this time, she advised further treatment with a longer course of higher dose steroids, and a topical steroid. She will see the patient in the office within the coming week. There was no indication for hospitalization or further workup. Patient was discharged with these prescriptions and instructions for follow-up. The patient verbalized understanding and agreement with this plan of care. The patient was advised to return to the emergency department if symptoms should significantly worsen or if new and concerning symptoms should appear. I estimate there is LOW risk for ANAPHYLAXIS, ROBISON-NETO SYNDROME, or TOXIC EPIDERMAL NECROLYSIS thus I consider the discharge disposition reasonable. CRITICAL CARE: There was a high probability of clinically significant/life threatening deterioration in this patient's condition, which required my urgent intervention. Total critical care time was 15 minutes. This excludes any time for separately reportable procedures. PROCEDURES:  None    FINAL IMPRESSION      1.  Drug eruption          DISPOSITION/PLAN   DISPOSITION Decision To Discharge 07/17/2021 08:02:17 PM      PATIENT REFERRED TO:  [facility]  [phone]    on Monday for an appointment later in the week      DISCHARGE MEDICATIONS:  Discharge Medication List as of 7/17/2021  8:13 PM      START taking these medications    Details   predniSONE (DELTASONE) 10 MG tablet Take 4 tablets by mouth each morning for 4 days, then 3, 2, 1, and 1/2 tablets daily each morning for 4 days each., Disp-26 tablet, R-0Print      triamcinolone (KENALOG) 0.1 % cream Apply topically 2 times daily Apply topically 2 times daily. , Topical, 2 TIMES DAILY Starting Sat 7/17/2021, Disp-60 g, R-0, Print             (Please note that portions of this note were completed with a voice recognition program.  Efforts were made to edit the dictations but occasionally words are mis-transcribed.)    Bruce Craft, [address] [place] [address]  07/18/21 3504

## 2021-07-19 LAB — HEMATOLOGY PATH CONSULT: NORMAL

## 2021-07-21 LAB
BLOOD CULTURE, ROUTINE: NORMAL
CULTURE, BLOOD 2: NORMAL

## 2021-11-12 ENCOUNTER — APPOINTMENT (OUTPATIENT)
Dept: GENERAL RADIOLOGY | Age: 61
DRG: 871 | End: 2021-11-12

## 2021-11-12 ENCOUNTER — HOSPITAL ENCOUNTER (INPATIENT)
Age: 61
LOS: 9 days | Discharge: HOME | DRG: 871 | End: 2021-11-22
Attending: EMERGENCY MEDICINE | Admitting: INTERNAL MEDICINE
Payer: MEDICAID

## 2021-11-12 DIAGNOSIS — J18.9 MULTIFOCAL PNEUMONIA: ICD-10-CM

## 2021-11-12 DIAGNOSIS — I48.91 ATRIAL FIBRILLATION WITH RAPID VENTRICULAR RESPONSE (HCC): ICD-10-CM

## 2021-11-12 DIAGNOSIS — U07.1 COVID: ICD-10-CM

## 2021-11-12 DIAGNOSIS — I48.91 NEW ONSET ATRIAL FIBRILLATION (HCC): Primary | ICD-10-CM

## 2021-11-12 PROCEDURE — 99284 EMERGENCY DEPT VISIT MOD MDM: CPT

## 2021-11-12 PROCEDURE — 71045 X-RAY EXAM CHEST 1 VIEW: CPT

## 2021-11-12 PROCEDURE — 96374 THER/PROPH/DIAG INJ IV PUSH: CPT

## 2021-11-12 PROCEDURE — 96376 TX/PRO/DX INJ SAME DRUG ADON: CPT

## 2021-11-12 RX ORDER — IBUPROFEN 400 MG/1
400 TABLET ORAL ONCE
Status: COMPLETED | OUTPATIENT
Start: 2021-11-12 | End: 2021-11-13

## 2021-11-12 RX ORDER — ACETAMINOPHEN 325 MG/1
650 TABLET ORAL ONCE
Status: COMPLETED | OUTPATIENT
Start: 2021-11-12 | End: 2021-11-13

## 2021-11-13 PROBLEM — I48.91 NEW ONSET ATRIAL FIBRILLATION (HCC): Status: ACTIVE | Noted: 2021-11-13

## 2021-11-13 LAB
ANION GAP SERPL CALCULATED.3IONS-SCNC: 13 MMOL/L (ref 3–16)
ANION GAP SERPL CALCULATED.3IONS-SCNC: 17 MMOL/L (ref 3–16)
BASOPHILS ABSOLUTE: 0 K/UL (ref 0–0.2)
BASOPHILS ABSOLUTE: 0 K/UL (ref 0–0.2)
BASOPHILS RELATIVE PERCENT: 0.3 %
BASOPHILS RELATIVE PERCENT: 0.3 %
BUN BLDV-MCNC: 12 MG/DL (ref 7–20)
BUN BLDV-MCNC: 13 MG/DL (ref 7–20)
C-REACTIVE PROTEIN: 62.9 MG/L (ref 0–5.1)
CALCIUM SERPL-MCNC: 7.2 MG/DL (ref 8.3–10.6)
CALCIUM SERPL-MCNC: 8.4 MG/DL (ref 8.3–10.6)
CHLORIDE BLD-SCNC: 100 MMOL/L (ref 99–110)
CHLORIDE BLD-SCNC: 98 MMOL/L (ref 99–110)
CO2: 19 MMOL/L (ref 21–32)
CO2: 19 MMOL/L (ref 21–32)
CREAT SERPL-MCNC: 1.1 MG/DL (ref 0.8–1.3)
CREAT SERPL-MCNC: 1.2 MG/DL (ref 0.8–1.3)
CRENATED RBC'S: ABNORMAL
D DIMER: 608 NG/ML DDU (ref 0–229)
EOSINOPHILS ABSOLUTE: 0 K/UL (ref 0–0.6)
EOSINOPHILS ABSOLUTE: 0 K/UL (ref 0–0.6)
EOSINOPHILS RELATIVE PERCENT: 0 %
EOSINOPHILS RELATIVE PERCENT: 0 %
GFR AFRICAN AMERICAN: >60
GFR AFRICAN AMERICAN: >60
GFR NON-AFRICAN AMERICAN: >60
GFR NON-AFRICAN AMERICAN: >60
GLUCOSE BLD-MCNC: 120 MG/DL (ref 70–99)
GLUCOSE BLD-MCNC: 126 MG/DL (ref 70–99)
HCT VFR BLD CALC: 44.4 % (ref 40.5–52.5)
HCT VFR BLD CALC: 49.1 % (ref 40.5–52.5)
HEMOGLOBIN: 15.2 G/DL (ref 13.5–17.5)
HEMOGLOBIN: 16.2 G/DL (ref 13.5–17.5)
LACTIC ACID: 1.2 MMOL/L (ref 0.4–2)
LYMPHOCYTES ABSOLUTE: 0.7 K/UL (ref 1–5.1)
LYMPHOCYTES ABSOLUTE: 0.9 K/UL (ref 1–5.1)
LYMPHOCYTES RELATIVE PERCENT: 17.6 %
LYMPHOCYTES RELATIVE PERCENT: 22 %
MCH RBC QN AUTO: 27.5 PG (ref 26–34)
MCH RBC QN AUTO: 28.3 PG (ref 26–34)
MCHC RBC AUTO-ENTMCNC: 33 G/DL (ref 31–36)
MCHC RBC AUTO-ENTMCNC: 34.3 G/DL (ref 31–36)
MCV RBC AUTO: 82.5 FL (ref 80–100)
MCV RBC AUTO: 83.6 FL (ref 80–100)
MONOCYTES ABSOLUTE: 0.3 K/UL (ref 0–1.3)
MONOCYTES ABSOLUTE: 0.3 K/UL (ref 0–1.3)
MONOCYTES RELATIVE PERCENT: 7.2 %
MONOCYTES RELATIVE PERCENT: 7.3 %
NEUTROPHILS ABSOLUTE: 2.9 K/UL (ref 1.7–7.7)
NEUTROPHILS ABSOLUTE: 3.1 K/UL (ref 1.7–7.7)
NEUTROPHILS RELATIVE PERCENT: 70.5 %
NEUTROPHILS RELATIVE PERCENT: 74.8 %
PDW BLD-RTO: 14.6 % (ref 12.4–15.4)
PDW BLD-RTO: 15.1 % (ref 12.4–15.4)
PLATELET # BLD: 106 K/UL (ref 135–450)
PLATELET # BLD: ABNORMAL K/UL (ref 135–450)
PLATELET SLIDE REVIEW: ADEQUATE
PMV BLD AUTO: 10.3 FL (ref 5–10.5)
PMV BLD AUTO: ABNORMAL FL (ref 5–10.5)
POIKILOCYTES: ABNORMAL
POTASSIUM REFLEX MAGNESIUM: 4 MMOL/L (ref 3.5–5.1)
POTASSIUM REFLEX MAGNESIUM: 4.1 MMOL/L (ref 3.5–5.1)
PRO-BNP: 869 PG/ML (ref 0–124)
PROCALCITONIN: 0.26 NG/ML (ref 0–0.15)
RBC # BLD: 5.38 M/UL (ref 4.2–5.9)
RBC # BLD: 5.87 M/UL (ref 4.2–5.9)
RBC # BLD: NORMAL 10*6/UL
SODIUM BLD-SCNC: 132 MMOL/L (ref 136–145)
SODIUM BLD-SCNC: 134 MMOL/L (ref 136–145)
T4 FREE: 1.1 NG/DL (ref 0.9–1.8)
TROPONIN: <0.01 NG/ML
TSH SERPL DL<=0.05 MIU/L-ACNC: 2.99 UIU/ML (ref 0.27–4.2)
VITAMIN D 25-HYDROXY: 8.4 NG/ML
WBC # BLD: 4.1 K/UL (ref 4–11)
WBC # BLD: 4.2 K/UL (ref 4–11)

## 2021-11-13 PROCEDURE — 85025 COMPLETE CBC W/AUTO DIFF WBC: CPT

## 2021-11-13 PROCEDURE — 85379 FIBRIN DEGRADATION QUANT: CPT

## 2021-11-13 PROCEDURE — 6360000002 HC RX W HCPCS: Performed by: INTERNAL MEDICINE

## 2021-11-13 PROCEDURE — 2700000000 HC OXYGEN THERAPY PER DAY

## 2021-11-13 PROCEDURE — 94761 N-INVAS EAR/PLS OXIMETRY MLT: CPT

## 2021-11-13 PROCEDURE — 6370000000 HC RX 637 (ALT 250 FOR IP): Performed by: INTERNAL MEDICINE

## 2021-11-13 PROCEDURE — XW0DXM6 INTRODUCTION OF BARICITINIB INTO MOUTH AND PHARYNX, EXTERNAL APPROACH, NEW TECHNOLOGY GROUP 6: ICD-10-PCS | Performed by: INTERNAL MEDICINE

## 2021-11-13 PROCEDURE — 2500000003 HC RX 250 WO HCPCS: Performed by: INTERNAL MEDICINE

## 2021-11-13 PROCEDURE — 82306 VITAMIN D 25 HYDROXY: CPT

## 2021-11-13 PROCEDURE — 36415 COLL VENOUS BLD VENIPUNCTURE: CPT

## 2021-11-13 PROCEDURE — 2060000000 HC ICU INTERMEDIATE R&B

## 2021-11-13 PROCEDURE — 83605 ASSAY OF LACTIC ACID: CPT

## 2021-11-13 PROCEDURE — 2500000003 HC RX 250 WO HCPCS: Performed by: EMERGENCY MEDICINE

## 2021-11-13 PROCEDURE — 84484 ASSAY OF TROPONIN QUANT: CPT

## 2021-11-13 PROCEDURE — 2580000003 HC RX 258: Performed by: EMERGENCY MEDICINE

## 2021-11-13 PROCEDURE — 86140 C-REACTIVE PROTEIN: CPT

## 2021-11-13 PROCEDURE — 99255 IP/OBS CONSLTJ NEW/EST HI 80: CPT | Performed by: INTERNAL MEDICINE

## 2021-11-13 PROCEDURE — 80048 BASIC METABOLIC PNL TOTAL CA: CPT

## 2021-11-13 PROCEDURE — 84145 PROCALCITONIN (PCT): CPT

## 2021-11-13 PROCEDURE — 84443 ASSAY THYROID STIM HORMONE: CPT

## 2021-11-13 PROCEDURE — 93005 ELECTROCARDIOGRAM TRACING: CPT | Performed by: EMERGENCY MEDICINE

## 2021-11-13 PROCEDURE — 84439 ASSAY OF FREE THYROXINE: CPT

## 2021-11-13 PROCEDURE — 6370000000 HC RX 637 (ALT 250 FOR IP): Performed by: EMERGENCY MEDICINE

## 2021-11-13 PROCEDURE — 83880 ASSAY OF NATRIURETIC PEPTIDE: CPT

## 2021-11-13 PROCEDURE — 2580000003 HC RX 258: Performed by: INTERNAL MEDICINE

## 2021-11-13 RX ORDER — DILTIAZEM HYDROCHLORIDE 5 MG/ML
10 INJECTION INTRAVENOUS ONCE
Status: COMPLETED | OUTPATIENT
Start: 2021-11-13 | End: 2021-11-13

## 2021-11-13 RX ORDER — DEXAMETHASONE 6 MG/1
6 TABLET ORAL DAILY
Status: DISCONTINUED | OUTPATIENT
Start: 2021-11-13 | End: 2021-11-17

## 2021-11-13 RX ORDER — ASPIRIN 81 MG/1
81 TABLET ORAL DAILY
Status: DISCONTINUED | OUTPATIENT
Start: 2021-11-13 | End: 2021-11-13

## 2021-11-13 RX ORDER — SPIRONOLACTONE 25 MG/1
25 TABLET ORAL DAILY
Status: DISCONTINUED | OUTPATIENT
Start: 2021-11-13 | End: 2021-11-13

## 2021-11-13 RX ORDER — POLYETHYLENE GLYCOL 3350 17 G/17G
17 POWDER, FOR SOLUTION ORAL DAILY PRN
Status: DISCONTINUED | OUTPATIENT
Start: 2021-11-13 | End: 2021-11-22 | Stop reason: HOSPADM

## 2021-11-13 RX ORDER — ACETAMINOPHEN 650 MG/1
650 SUPPOSITORY RECTAL EVERY 4 HOURS PRN
Status: DISCONTINUED | OUTPATIENT
Start: 2021-11-13 | End: 2021-11-22 | Stop reason: HOSPADM

## 2021-11-13 RX ORDER — ACETAMINOPHEN 325 MG/1
650 TABLET ORAL EVERY 4 HOURS PRN
Status: DISCONTINUED | OUTPATIENT
Start: 2021-11-13 | End: 2021-11-22 | Stop reason: HOSPADM

## 2021-11-13 RX ORDER — ONDANSETRON 2 MG/ML
4 INJECTION INTRAMUSCULAR; INTRAVENOUS EVERY 4 HOURS PRN
Status: DISCONTINUED | OUTPATIENT
Start: 2021-11-13 | End: 2021-11-22 | Stop reason: HOSPADM

## 2021-11-13 RX ORDER — SODIUM CHLORIDE 0.9 % (FLUSH) 0.9 %
10 SYRINGE (ML) INJECTION EVERY 12 HOURS SCHEDULED
Status: DISCONTINUED | OUTPATIENT
Start: 2021-11-13 | End: 2021-11-22 | Stop reason: HOSPADM

## 2021-11-13 RX ORDER — SODIUM CHLORIDE 0.9 % (FLUSH) 0.9 %
10 SYRINGE (ML) INJECTION PRN
Status: DISCONTINUED | OUTPATIENT
Start: 2021-11-13 | End: 2021-11-22 | Stop reason: HOSPADM

## 2021-11-13 RX ORDER — 0.9 % SODIUM CHLORIDE 0.9 %
500 INTRAVENOUS SOLUTION INTRAVENOUS ONCE
Status: COMPLETED | OUTPATIENT
Start: 2021-11-13 | End: 2021-11-13

## 2021-11-13 RX ORDER — SODIUM CHLORIDE 9 MG/ML
25 INJECTION, SOLUTION INTRAVENOUS PRN
Status: DISCONTINUED | OUTPATIENT
Start: 2021-11-13 | End: 2021-11-22 | Stop reason: HOSPADM

## 2021-11-13 RX ADMIN — DILTIAZEM HYDROCHLORIDE 5 MG/HR: 5 INJECTION INTRAVENOUS at 01:04

## 2021-11-13 RX ADMIN — DILTIAZEM HYDROCHLORIDE 30 MG: 30 TABLET, FILM COATED ORAL at 23:48

## 2021-11-13 RX ADMIN — AMIODARONE HYDROCHLORIDE 1 MG/MIN: 50 INJECTION, SOLUTION INTRAVENOUS at 16:52

## 2021-11-13 RX ADMIN — AMIODARONE HYDROCHLORIDE 0.5 MG/MIN: 50 INJECTION, SOLUTION INTRAVENOUS at 21:19

## 2021-11-13 RX ADMIN — IBUPROFEN 400 MG: 400 TABLET, FILM COATED ORAL at 00:26

## 2021-11-13 RX ADMIN — ENOXAPARIN SODIUM 90 MG: 100 INJECTION SUBCUTANEOUS at 21:14

## 2021-11-13 RX ADMIN — ENOXAPARIN SODIUM 90 MG: 100 INJECTION SUBCUTANEOUS at 09:13

## 2021-11-13 RX ADMIN — DILTIAZEM HYDROCHLORIDE 15 MG: 30 TABLET, FILM COATED ORAL at 05:50

## 2021-11-13 RX ADMIN — SODIUM CHLORIDE 500 ML: 9 INJECTION, SOLUTION INTRAVENOUS at 01:58

## 2021-11-13 RX ADMIN — DEXAMETHASONE 6 MG: 6 TABLET ORAL at 17:22

## 2021-11-13 RX ADMIN — DILTIAZEM HYDROCHLORIDE 30 MG: 30 TABLET, FILM COATED ORAL at 17:22

## 2021-11-13 RX ADMIN — AMIODARONE HYDROCHLORIDE 150 MG: 1.5 INJECTION, SOLUTION INTRAVENOUS at 16:32

## 2021-11-13 RX ADMIN — DILTIAZEM HYDROCHLORIDE 10 MG: 5 INJECTION INTRAVENOUS at 01:01

## 2021-11-13 RX ADMIN — DILTIAZEM HYDROCHLORIDE 15 MG: 30 TABLET, FILM COATED ORAL at 12:15

## 2021-11-13 RX ADMIN — AMIODARONE HYDROCHLORIDE 0.5 MG/MIN: 50 INJECTION, SOLUTION INTRAVENOUS at 21:16

## 2021-11-13 RX ADMIN — ASPIRIN 81 MG: 81 TABLET, COATED ORAL at 09:13

## 2021-11-13 RX ADMIN — ACETAMINOPHEN 650 MG: 325 TABLET ORAL at 00:27

## 2021-11-13 RX ADMIN — DILTIAZEM HYDROCHLORIDE 2.5 MG/HR: 5 INJECTION INTRAVENOUS at 01:59

## 2021-11-13 RX ADMIN — SPIRONOLACTONE 25 MG: 25 TABLET ORAL at 09:13

## 2021-11-13 ASSESSMENT — PAIN SCALES - GENERAL
PAINLEVEL_OUTOF10: 0
PAINLEVEL_OUTOF10: 7

## 2021-11-13 ASSESSMENT — ENCOUNTER SYMPTOMS
CHEST TIGHTNESS: 0
EYE DISCHARGE: 0
WHEEZING: 0
BACK PAIN: 0
COLOR CHANGE: 0
NAUSEA: 0
CONSTIPATION: 0
SHORTNESS OF BREATH: 1
ABDOMINAL PAIN: 0
CHOKING: 0
EYE ITCHING: 0
ABDOMINAL DISTENTION: 0
STRIDOR: 0
DIARRHEA: 0
APNEA: 0
EYE PAIN: 0
VOMITING: 0
EYE REDNESS: 0
PHOTOPHOBIA: 0
BLOOD IN STOOL: 0
COUGH: 1
ANAL BLEEDING: 0
RECTAL PAIN: 0

## 2021-11-13 NOTE — ED NOTES
Report called to Lola JACOBS RN to assume care, all questions answered. Lopez fall/ pain discussed. IV to right forearm and right wrist normal saline locked. Pt on 2 L NC on pt, SpO2 97%. Telemetry monitoring on pt, HR afib, 100-114 BPM. Pt to go to room 5271.       Kristen Erickson RN  11/13/21 4252

## 2021-11-13 NOTE — ED NOTES
Patient resting comfortably in bed, no signs of acute distress noted. Cardizem gtt has been stopped. HR 90s, afib on monitor.      Molly Garcia RN  11/13/21 1600

## 2021-11-13 NOTE — PROGRESS NOTES
Respiratory Therapy    SPO2 94% on 3L; cont.  Pulse oximetry in place  Educated pt on PRONE positioning, pt agreeable  Persistent PC    Electronically signed by Aida Patel RCP on 11/13/2021 at 4:48 PM

## 2021-11-13 NOTE — ED NOTES
Bed: B-08  Expected date: 11/12/21  Expected time: 10:57 PM  Means of arrival: [facility]  Comments:  demario Garsia RN  11/12/21 6396

## 2021-11-13 NOTE — PROGRESS NOTES
Pt to room.  A&O, call light in reach, monitor on and showing pt in Afib, cardiology at bedside, sats in the 90's on 3l O2

## 2021-11-13 NOTE — PROGRESS NOTES
4 Eyes Skin Assessment     NAME:  Reina Akins  YOB: 1960  MEDICAL RECORD NUMBER:  [id number]    The patient is being assess for  Admission    I agree that 2 RN's have performed a thorough Head to Toe Skin Assessment on the patient. ALL assessment sites listed below have been assessed. Areas assessed by both nurses:    Head, Face, Ears, Shoulders, Back, Chest, Arms, Elbows, Hands, Sacrum. Buttock, Coccyx, Ischium and Legs. Feet and Heels        Does the Patient have a Wound?  No noted wound(s)       Tor Prevention initiated:  No   Wound Care Orders initiated:  No    Pressure Injury (Stage 3,4, Unstageable, DTI, NWPT, and Complex wounds) if present place consult order under [de-identified] No    New and Established Ostomies if present place consult order under : No      Nurse 1 eSignature: Electronically signed by Edmond Gallo RN on 11/13/21 at 3:13 PM EST    **SHARE this note so that the co-signing nurse is able to place an eSignature**    Nurse 2 eSignature: Electronically signed by Blayne Lopez RN on 11/13/21 at 6:12 PM EST

## 2021-11-13 NOTE — ED NOTES
Pt presents to ED from home via squad, with complaints of  SOB, fatigue, sore throat. Pt diagnosed with COVID recently and feels like his symptoms are worsening. Pt tacky to 130s on the monitor, sats in the mid to low 90s  and sustaining. Pt alert and oriented x 4, labs collected and sent at this time.       Gill Artis RN  11/13/21 1111

## 2021-11-13 NOTE — ED NOTES
Patient resting comfortably in bed, no signs of acute distress noted. Meal tray at bedside. Call light within reach.      Kristen Erickson RN  11/13/21 8989

## 2021-11-13 NOTE — ED PROVIDER NOTES
[address] [facility]      Pt Name: Mumtaz Uribe  MRN: [id number]  [place] 1960  Date of evaluation: 11/12/2021  Provider: Christa Kay MD    CHIEF COMPLAINT       Chief Complaint   Patient presents with    Cough    Positive For Covid-19       HISTORY OF PRESENT ILLNESS    Mumtaz Uribe is a 62 y.o. male who presents to the emergency department with cough, Covid, shortness of breath, chest pain. Patient states symptoms have been going on for the last 7 days. States her not improving and getting worse. Decided to come to the emergency room when his oxygen saturation was low at home. Endorses 8 out of 10 achy chest pain. Dry cough. Exertional shortness of breath. Positive for fevers at home. States he cannot catch his breath. Pain is worse with movement. Better with rest.  Started spontaneously. Is not vaccinated. No other associated symptoms. Nursing Notes were reviewed. Including nursing noted for FM, Surgical History, Past Medical History, Social History, vitals, and allergies; agree with all. REVIEW OF SYSTEMS       Review of Systems   Constitutional: Positive for activity change, appetite change, chills, fatigue and fever. Negative for diaphoresis and unexpected weight change. HENT: Positive for congestion. Negative for dental problem, drooling, ear discharge and ear pain. Eyes: Negative for photophobia, pain, discharge, redness, itching and visual disturbance. Respiratory: Positive for cough and shortness of breath. Negative for apnea, choking, chest tightness, wheezing and stridor. Cardiovascular: Positive for chest pain. Negative for palpitations and leg swelling. Gastrointestinal: Negative for abdominal distention, abdominal pain, anal bleeding, blood in stool, constipation, diarrhea, nausea, rectal pain and vomiting. Endocrine: Negative for cold intolerance and heat intolerance.    Genitourinary: Negative for file     Social Determinants of Health     Financial Resource Strain: Low Risk     Difficulty of Paying Living Expenses: Not hard at all   Food Insecurity: No Food Insecurity    Worried About Running Out of Food in the Last Year: Never true    Parveen of Food in the Last Year: Never true   Transportation Needs:     Lack of Transportation (Medical): Not on file    Lack of Transportation (Non-Medical): Not on file   Physical Activity:     Days of Exercise per Week: Not on file    Minutes of Exercise per Session: Not on file   Stress:     Feeling of Stress : Not on file   Social Connections:     Frequency of Communication with Friends and Family: Not on file    Frequency of Social Gatherings with Friends and Family: Not on file    Attends Islam Services: Not on file    Active Member of [address] [place] [address] [employer] or Organizations: Not on file    Attends Club or Organization Meetings: Not on file    Marital Status: Not on file   Intimate Partner Violence:     Fear of Current or Ex-Partner: Not on file    Emotionally Abused: Not on file    Physically Abused: Not on file    Sexually Abused: Not on file   Housing Stability:     Unable to Pay for Housing in the Last Year: Not on file    Number of [place] in the Last Year: Not on file    Unstable Housing in the Last Year: Not on file       PHYSICAL EXAM       ED Triage Vitals [11/12/21 2315]   BP Temp Temp Source Pulse Resp SpO2 Height Weight   (!) 151/110 100.7 °F (38.2 °C) Oral 70 22 -- 5' 11\" (1.803 m) 202 lb 6.1 oz (91.8 kg)       Physical Exam  Vitals and nursing note reviewed. Constitutional:       General: He is not in acute distress. Appearance: He is well-developed. He is ill-appearing. He is not diaphoretic. HENT:      Head: Normocephalic and atraumatic. Eyes:      General:         Right eye: No discharge. Left eye: No discharge. Pupils: Pupils are equal, round, and reactive to light. Neck:      Thyroid: No thyromegaly. Trachea: No tracheal deviation. Cardiovascular:      Rate and Rhythm: Tachycardia present. Rhythm irregular. Heart sounds: No murmur heard. Pulmonary:      Breath sounds: No wheezing or rales. Chest:      Chest wall: No tenderness. Abdominal:      General: There is no distension. Palpations: Abdomen is soft. There is no mass. Tenderness: There is no abdominal tenderness. There is no guarding or rebound. Musculoskeletal:         General: No tenderness or deformity. Normal range of motion. Cervical back: Normal range of motion. Skin:     General: Skin is warm. Coloration: Skin is not pale. Findings: No erythema or rash. Neurological:      Mental Status: He is alert. Cranial Nerves: No cranial nerve deficit. Motor: No abnormal muscle tone.       Coordination: Coordination normal.         DIAGNOSTIC RESULTS     EKG: All EKG's are interpreted by the Emergency Department Physician who either signs or Co-signs this chart in the absence of acardiologist.    EKG A. fib RVR    RADIOLOGY:   Non-plain film images such as CT, Ultrasoundand MRI are read by the radiologist. Plain radiographic images are visualized and preliminarily interpreted by the emergency physician with the below findings:    Chest x-ray shows multifocal pneumonia    ED BEDSIDE ULTRASOUND:   Performed by ED Physician - none    LABS:  Labs Reviewed   CBC WITH AUTO DIFFERENTIAL - Abnormal; Notable for the following components:       Result Value    Lymphocytes Absolute 0.7 (*)     All other components within normal limits    Narrative:     Performed at:  [address] [address]   Phone [phone]   BASIC METABOLIC PANEL W/ REFLEX TO MG FOR LOW K - Abnormal; Notable for the following components:    Sodium 134 (*)     Chloride 98 (*)     CO2 19 (*)     Anion Gap 17 (*)     Glucose 120 (*)     All other components within normal limits Narrative:     Performed at:  [facility]  [address]  [place], [place] [address]   Phone [phone]   BRAIN NATRIURETIC PEPTIDE - Abnormal; Notable for the following components:    Pro- (*)     All other components within normal limits    Narrative:     Performed at:  [facility]  [address]  [place], [place] [address]   Phone [phone]   TROPONIN    Narrative:     Performed at:  [facility]  [address] [facility] [place] [address]   Phone [phone]   LACTIC ACID, PLASMA   PROCALCITONIN   T4, FREE   TSH WITHOUT REFLEX   D-DIMER, QUANTITATIVE   VITAMIN D 25 HYDROXY   C-REACTIVE PROTEIN   BASIC METABOLIC PANEL W/ REFLEX TO MG FOR LOW K   CBC WITH AUTO DIFFERENTIAL       All other labs were withinnormal range or not returned as of this dictation. EMERGENCY DEPARTMENT COURSE and DIFFERENTIAL DIAGNOSIS/MDM:     PMH, Surgical Hx, FH, Social Hx reviewed by myself (ETOH usage, Tobacco usage, Drug usage reviewed by myself, no pertinent Hx)- No Pertinent Hx     Old records were reviewed by me     62 yr old COVID 7 days with new onset afib RVR. Cardizem bolus and drip. Admission new onset afib rvr and multifocal pneumonia from [facility]. CRITICAL CARE TIME   Total Critical Caretime was 39 minutes, excluding separately reportable procedures. There was a high probability of clinically significant/life threatening deterioration in the patient's condition which required my urgent intervention. PROCEDURES:  Unlessotherwise noted below, none    FINAL IMPRESSION      1. New onset atrial fibrillation ([address]    2. Atrial fibrillation with rapid ventricular response ([address]    3. Multifocal pneumonia    4.  COVID          DISPOSITION/PLAN   DISPOSITION      Admission    (Please note that portions ofthis note were completed with a voice recognition program.  Efforts were made to edit the dictations but occasionally words are mis-transcribed.)    Isi Ace MD(electronically signed)  Attending Emergency Physician        Isi Ace MD  11/13/21 0428

## 2021-11-13 NOTE — CONSULTS
Referring Physician: Dr. Christina Mcfarlane  Reason for Consultation: \"COVID19 Dx 7 days ago New onset A.fib with RVR\"  Chief Complaint: Cough and shortness of breath    Subjective:   History of Present Illness:  Jairo Weiss is a 62 y.o. patient who presented to the hospital with complaints of generalized malaise, cough, and shortness of breath for the past week. He was diagnosed with COVID-19 but states that he continued to feel worse so he sought medical attention in the emergency department. The patient was not vaccinated. He was found to be in atrial fibrillation with a rapid ventricular response prompting consultation. He denies palpitations or an awareness of his heart rate being elevated. He is still in atrial fibrillation and denies chest pains but does have pleuritic chest pain with coughing. Prior to his acute illness, he denied exertional chest pains, dyspnea on exertion, palpitations, lower extremity edema, PND, or orthopnea. He denies a prior diagnosis of atrial fibrillation. The patient feels short of breath at rest.  Reportedly, his oxygen levels dropped with ambulation but are normal at rest.    Past Medical History:   has a past medical history of COVID-19. Surgical History:   has a past surgical history that includes Tonsillectomy. Social History:   reports that he has never smoked. He has never used smokeless tobacco. He reports previous drug use. He reports that he does not drink alcohol. Family History:  family history includes Diabetes in his mother; Heart Disease in his brother; High Blood Pressure in his mother. Home Medications:  Were reviewed and are listed in nursing record and/or below  Prior to Admission medications    Medication Sig Start Date End Date Taking? Authorizing Provider   predniSONE (DELTASONE) 10 MG tablet Take 4 tablets by mouth each morning for 4 days, then 3, 2, 1, and 1/2 tablets daily each morning for 4 days each.  7/17/21   ROSELIA Tran triamcinolone (KENALOG) 0.1 % cream Apply topically 2 times daily Apply topically 2 times daily. 7/17/21   ROSELIA Garcia   potassium chloride (KLOR-CON M) 10 MEQ extended release tablet Take 1 tablet by mouth daily for 10 days 6/16/21 6/26/21  Josh Marie MD   spironolactone (ALDACTONE) 25 MG tablet Take 1 tablet by mouth daily 6/14/21   Josh Marie MD   aspirin 81 MG EC tablet Take 81 mg by mouth daily     Historical Provider, MD        CURRENT Medications:  spironolactone (ALDACTONE) tablet 25 mg, Daily  aspirin EC tablet 81 mg, Daily  sodium chloride flush 0.9 % injection 10 mL, 2 times per day  [Held by provider] sodium chloride flush 0.9 % injection 10 mL, PRN  0.9 % sodium chloride infusion, PRN  ondansetron (ZOFRAN) injection 4 mg, Q4H PRN  polyethylene glycol (GLYCOLAX) packet 17 g, Daily PRN  acetaminophen (TYLENOL) tablet 650 mg, Q4H PRN   Or  acetaminophen (TYLENOL) suppository 650 mg, Q4H PRN  enoxaparin (LOVENOX) injection 90 mg, BID  dilTIAZem (CARDIZEM) tablet 15 mg, 4 times per day    Allergies:  Amoxicillin and Pcn [penicillins]     Review of Systems:   · Constitutional: no unanticipated weight loss. There's been no change in energy level, sleep pattern, or activity level. No fevers, chills. · Eyes: No visual changes or diplopia. No scleral icterus. · ENT: No Headaches, hearing loss or vertigo. No mouth sores or sore throat. · Cardiovascular: as reviewed in HPI  · Respiratory: No cough or wheezing, no sputum production. No hemoptysis. · Gastrointestinal: No abdominal pain, appetite loss, blood in stools. No change in bowel or bladder habits. · Genitourinary: No dysuria, trouble voiding, or hematuria. · Musculoskeletal:  No gait disturbance, no joint complaints. · Integumentary: No rash or pruritis. · Neurological: No headache, diplopia, change in muscle strength, numbness or tingling. · Psychiatric: No anxiety or depression. · Endocrine: No temperature intolerance.  No excessive thirst, fluid intake, or urination. No tremor. · Hematologic/Lymphatic: No abnormal bruising or bleeding, blood clots or swollen lymph nodes. · Allergic/Immunologic: No nasal congestion or hives. Objective:   PHYSICAL EXAM:    Vitals:    11/13/21 1303   BP: 117/90   Pulse: 99   Resp: (!) 31   Temp: 98.5   SpO2: 97%    Weight: 202 lb 6.1 oz (91.8 kg)       General Appearance:  Alert, cooperative, no respiratory distress but acutely ill-appearing, appears stated age. Coughing. Head:  Normocephalic, without obvious abnormality, atraumatic. Eyes:  Pupils equal and round. No scleral icterus. Mouth: Moist mucosa, no pharyngeal erythema. Nose: Nares normal. No drainage or sinus tenderness. Neck: Supple, symmetrical, trachea midline. No adenopathy. No tenderness/mass/nodules. No carotid bruit or elevated JVD. Lungs:   Respirations unlabored at rest.  +rhonchi. Chest Wall:  No tenderness or deformity. Heart:  Irregularly irregular and tachycardic. S1/S2 normal. No murmur, rub, or gallop. Abdomen:   Soft, non-tender, bowel sounds active. Musculoskeletal: No muscle wasting or digital clubbing. Extremities: Extremities normal, atraumatic. No cyanosis or edema. Pulses: 2+ radial and carotid pulses, symmetric. Skin: No rashes or lesions. Pysch: Normal mood and affect. Alert and oriented x 4.    Neurologic: Normal gross motor and sensory exam.       Labs     CBC:   Lab Results   Component Value Date    WBC 4.2 11/13/2021    RBC 5.38 11/13/2021    HGB 15.2 11/13/2021    HCT 44.4 11/13/2021    MCV 82.5 11/13/2021    RDW 14.6 11/13/2021     11/13/2021     CMP:  Lab Results   Component Value Date     11/13/2021    K 4.0 11/13/2021     11/13/2021    CO2 19 11/13/2021    BUN 13 11/13/2021    CREATININE 1.1 11/13/2021    GFRAA >60 11/13/2021    AGRATIO 1.4 03/18/2020    LABGLOM >60 11/13/2021    GLUCOSE 126 11/13/2021    PROT 7.2 03/18/2020    CALCIUM 7.2 11/13/2021    BILITOT 0.6 03/18/2020    ALKPHOS 91 03/18/2020    AST 19 03/18/2020    ALT 18 03/18/2020     PT/INR:  No results found for: PTINR  HgBA1c:No results found for: LABA1C  Lab Results   Component Value Date    CKTOTAL 1,021 (H) 08/12/2015    TROPONINI <0.01 11/13/2021       Cardiac Data     Telemetry: Personally interpreted. Atrial fibrillation with rapid ventricular response. Assessment and Plan   1) Atrial fibrillation with rapid ventricular response. Symptoms more likely from his COVID-19 pneumonia. CHADS-Vasc is one (HTN). Actual onset unknown. On therapeutic anticoagulation with Lovenox. Patient became hypotensive with IV Cardizem. Will start IV amiodarone. Resume oral Cardizem for improved rate control. An echocardiogram would be appropriate but would defer until after patient has recovered from his Covid infection. No emergent indication for electrical cardioversion but discussed possibility if unable to control rate with medications. 2) COVID-19 pneumonia. Will defer management to primary team.    3) Essential hypertension. Controlled. Goal BP <130/80. Continue medical therapy. Overall, the problems requiring hospitalization are high in severity. Thank you for allowing us to participate in the care of Brii Johnson. Christo Cobos.  David Leong, [address] [place] [address] Road  11/13/2021 1:14 PM

## 2021-11-13 NOTE — ED NOTES
Patient ambulatory to restroom with steady gait. Pt back in bed, SpO2 87% on room air. 2 L NC placed on patient at this time. Hospitalist made aware.      Cathi Kunz RN  11/13/21 1110

## 2021-11-13 NOTE — H&P
Hospital Medicine History & Physical      PCP: Magali Atkins MD    Date of Admission: 11/12/2021    Date of Service: Pt seen/examined on 11/12/2021    Chief Complaint:      Chief Complaint   Patient presents with    Cough    Positive For Covid-19       History Of Present Illness: The patient is a 62 y.o. male who presents to [facility] with chest pain shortness of breath and fever since the last 7-10 days. Patient does have exertional shortness of breath with cough which got progressively worse since the last 1 to 2 days. In the ER patient was found to have Covid with A. fib with RVR. It was hypoxic in the ER but at rest his oxygen better. Patient ambulated to the bathroom patient's oxygen dropped again    Past Medical History:    No past medical history on file. Past Surgical History:        Procedure Laterality Date    TONSILLECTOMY      as a child       Medications Prior to Admission:    Prior to Admission medications    Medication Sig Start Date End Date Taking? Authorizing Provider   predniSONE (DELTASONE) 10 MG tablet Take 4 tablets by mouth each morning for 4 days, then 3, 2, 1, and 1/2 tablets daily each morning for 4 days each. 7/17/21   Lovett Cooks, PA   triamcinolone (KENALOG) 0.1 % cream Apply topically 2 times daily Apply topically 2 times daily. 7/17/21   Lovett Cooks, PA   potassium chloride (KLOR-CON M) 10 MEQ extended release tablet Take 1 tablet by mouth daily for 10 days 6/16/21 6/26/21  Magali Atkins MD   spironolactone (ALDACTONE) 25 MG tablet Take 1 tablet by mouth daily 6/14/21   Magali Atkins MD   aspirin 81 MG EC tablet Take 81 mg by mouth daily     Historical Provider, MD       Allergies:  Amoxicillin and Pcn [penicillins]    Social History:       reports that he has never smoked. He has never used smokeless tobacco. He reports previous drug use. He reports that he does not drink alcohol. Family History:  Reviewed in detail and negative for DM, Early CAD, Cancer, CVA. Positive as follows:        Problem Relation Age of Onset    Diabetes Mother     High Blood Pressure Mother     Heart Disease Brother        REVIEW OF SYSTEMS:   Positive review  noted in the HPI. All other systems reviewed and negative. PHYSICAL EXAM:    /77   Pulse 94   Temp 100.7 °F (38.2 °C) (Oral)   Resp (!) 34   Ht 5' 11\" (1.803 m)   Wt 202 lb 6.1 oz (91.8 kg)   SpO2 92%   BMI 28.23 kg/m²   General Appearance: alert and oriented to person, place and time, well developed and well- nourished, in no acute distress  Skin: warm and dry, no rash or erythema  Head: normocephalic and atraumatic  Eyes: pupils equal, round, and reactive to light, extraocular eye movements intact, conjunctivae normal  ENT: tympanic membrane, external ear and ear canal normal bilaterally, nose without deformity, nasal mucosa and turbinates normal without polyps  Neck: supple and non-tender without mass, no thyromegaly or thyroid nodules, no cervical lymphadenopathy  Pulmonary/Chest: clear to auscultation bilaterally- no wheezes, rales or rhonchi, normal air movement, no respiratory distress  Cardiovascular: normal rate, regular rhythm, normal S1 and S2, no murmurs, rubs, clicks, or gallops, Peripheral pulses good, Cap refill <3 sec, no carotid bruits  Abdomen: soft, non-tender, non-distended, normal bowel sounds, no masses or organomegaly  Extremities: no cyanosis, clubbing or edema  Musculoskeletal: normal range of motion, no joint swelling, deformity or tenderness  Neurologic: reflexes normal and symmetric, no cranial nerve deficit, gait, coordination and speech normal      LABS:    CXR:  I have reviewed the CXR with the following interpretation: Multifocal pneumonia            EKG:  I have reviewed the EKG with the following interpretation: A. fib with RVR.     CBC   Recent Labs     11/13/21  0009 11/13/21  0530   WBC 4.1 4.2   HGB 16.2 15.2   HCT 49.1 44.4   PLT see below  --       RENAL  Recent Labs certify that Gwen Prabhakar is expected to be hospitalized for >2 midnights based on the following assessment and plan:    ASSESSMENT/PLAN:  Active Hospital Problems    Diagnosis Date Noted    New onset atrial fibrillation ([address] [I48.91] 11/13/2021     A. fib with RVR  - patient was started on Cardizem drip  -We will switch to p.o. meds  [id number] Score for Atrial Fibrillation Stroke Risk   Risk   Factors  Component Value   C CHF No 0   H HTN Yes 1   A2 Age >= 76 No,  (62 y.o.) 0   D DM No 0   S2 Prior Stroke/TIA No 0   V Vascular Disease No 0   A Age 74-69 No,  (58 y.o.) 0   Sc Sex male 0    [id number]  Score  1   Score last updated 11/13/21 2:51 PM EST        Sepsis present on admission  -With fever and tachycardia and tachypnea  -Secondary to Covid    Acute hypoxic respiratory failure  -Patient's oxygen was 86% on room air. Improved but then dropped again to 89% with ambulation  -Wean as tolerated      COVID-19 pneumonia  -With hypoxia we'll start steroids. -If patient's oxygen trends up will start baricitinib  -Inflammatory markers noted          DVT Prophylaxis: Lovenox  Diet: ADULT DIET; Regular  Code Status: Full Code      Dispo -PCU       Johann Gates MD  The note was completed using EMR. Every effort was made to ensure accuracy; however, inadvertent computerized transcription errors may be present. Thank you Josh Marie MD for the opportunity to be involved in this patient's care. If you have any questions or concerns please feel free to contact me at [phone].

## 2021-11-13 NOTE — ED NOTES
Pt getting hypotensive, MD made aware, dip rate decreased to 2.5 at this time, Administered  500ML  Bolus as ordered by MD. Pt tolerating well and in no acute distress.       Jarred Novak RN  11/13/21 6470       Jarred Novak RN  11/13/21 5938       Jarred Novak RN  11/13/21 6641

## 2021-11-14 LAB
ANION GAP SERPL CALCULATED.3IONS-SCNC: 12 MMOL/L (ref 3–16)
ANISOCYTOSIS: ABNORMAL
BASOPHILS ABSOLUTE: 0.1 K/UL (ref 0–0.2)
BASOPHILS RELATIVE PERCENT: 0.9 %
BUN BLDV-MCNC: 14 MG/DL (ref 7–20)
C-REACTIVE PROTEIN: 91.6 MG/L (ref 0–5.1)
CALCIUM SERPL-MCNC: 8.2 MG/DL (ref 8.3–10.6)
CHLORIDE BLD-SCNC: 101 MMOL/L (ref 99–110)
CO2: 21 MMOL/L (ref 21–32)
CREAT SERPL-MCNC: 1 MG/DL (ref 0.8–1.3)
D DIMER: 532 NG/ML DDU (ref 0–229)
EOSINOPHILS ABSOLUTE: 0 K/UL (ref 0–0.6)
EOSINOPHILS RELATIVE PERCENT: 0 %
GFR AFRICAN AMERICAN: >60
GFR NON-AFRICAN AMERICAN: >60
GLUCOSE BLD-MCNC: 140 MG/DL (ref 70–99)
HCT VFR BLD CALC: 47.5 % (ref 40.5–52.5)
HEMOGLOBIN: 15.9 G/DL (ref 13.5–17.5)
LYMPHOCYTES ABSOLUTE: 0.7 K/UL (ref 1–5.1)
LYMPHOCYTES RELATIVE PERCENT: 12.4 %
MCH RBC QN AUTO: 28 PG (ref 26–34)
MCHC RBC AUTO-ENTMCNC: 33.5 G/DL (ref 31–36)
MCV RBC AUTO: 83.5 FL (ref 80–100)
MONOCYTES ABSOLUTE: 0.4 K/UL (ref 0–1.3)
MONOCYTES RELATIVE PERCENT: 7.3 %
NEUTROPHILS ABSOLUTE: 4.5 K/UL (ref 1.7–7.7)
NEUTROPHILS RELATIVE PERCENT: 79.4 %
PDW BLD-RTO: 15 % (ref 12.4–15.4)
PLATELET # BLD: 132 K/UL (ref 135–450)
PLATELET SLIDE REVIEW: ABNORMAL
PMV BLD AUTO: 10.2 FL (ref 5–10.5)
POTASSIUM REFLEX MAGNESIUM: 4.5 MMOL/L (ref 3.5–5.1)
RBC # BLD: 5.69 M/UL (ref 4.2–5.9)
SLIDE REVIEW: ABNORMAL
SODIUM BLD-SCNC: 134 MMOL/L (ref 136–145)
WBC # BLD: 5.6 K/UL (ref 4–11)

## 2021-11-14 PROCEDURE — 6370000000 HC RX 637 (ALT 250 FOR IP): Performed by: INTERNAL MEDICINE

## 2021-11-14 PROCEDURE — 85025 COMPLETE CBC W/AUTO DIFF WBC: CPT

## 2021-11-14 PROCEDURE — 99233 SBSQ HOSP IP/OBS HIGH 50: CPT | Performed by: INTERNAL MEDICINE

## 2021-11-14 PROCEDURE — 6360000002 HC RX W HCPCS: Performed by: INTERNAL MEDICINE

## 2021-11-14 PROCEDURE — 86140 C-REACTIVE PROTEIN: CPT

## 2021-11-14 PROCEDURE — 2580000003 HC RX 258: Performed by: INTERNAL MEDICINE

## 2021-11-14 PROCEDURE — 36415 COLL VENOUS BLD VENIPUNCTURE: CPT

## 2021-11-14 PROCEDURE — 80048 BASIC METABOLIC PNL TOTAL CA: CPT

## 2021-11-14 PROCEDURE — 2060000000 HC ICU INTERMEDIATE R&B

## 2021-11-14 PROCEDURE — 85379 FIBRIN DEGRADATION QUANT: CPT

## 2021-11-14 RX ORDER — DILTIAZEM HYDROCHLORIDE 120 MG/1
120 CAPSULE, COATED, EXTENDED RELEASE ORAL DAILY
Status: DISCONTINUED | OUTPATIENT
Start: 2021-11-14 | End: 2021-11-22 | Stop reason: HOSPADM

## 2021-11-14 RX ADMIN — BARICITINIB 4 MG: 2 TABLET, FILM COATED ORAL at 14:19

## 2021-11-14 RX ADMIN — AMIODARONE HYDROCHLORIDE 0.5 MG/MIN: 50 INJECTION, SOLUTION INTRAVENOUS at 05:00

## 2021-11-14 RX ADMIN — SODIUM CHLORIDE, PRESERVATIVE FREE 10 ML: 5 INJECTION INTRAVENOUS at 21:11

## 2021-11-14 RX ADMIN — DILTIAZEM HYDROCHLORIDE 120 MG: 120 CAPSULE, COATED, EXTENDED RELEASE ORAL at 12:50

## 2021-11-14 RX ADMIN — ENOXAPARIN SODIUM 90 MG: 100 INJECTION SUBCUTANEOUS at 09:19

## 2021-11-14 RX ADMIN — DEXAMETHASONE 6 MG: 6 TABLET ORAL at 09:19

## 2021-11-14 RX ADMIN — APIXABAN 5 MG: 5 TABLET, FILM COATED ORAL at 21:11

## 2021-11-14 RX ADMIN — DILTIAZEM HYDROCHLORIDE 30 MG: 30 TABLET, FILM COATED ORAL at 05:29

## 2021-11-14 ASSESSMENT — PAIN SCALES - GENERAL
PAINLEVEL_OUTOF10: 0

## 2021-11-14 NOTE — PROGRESS NOTES
Referring Physician: Dr. Hannah Baum  Reason for Consultation: \"COVID19 Dx 7 days ago New onset A.fib with RVR\"  Chief Complaint: Cough and shortness of breath    Subjective:   History of Present Illness:  John Pickens is a 62 y.o. patient who presented to the hospital with complaints of generalized malaise, cough, and shortness of breath for the past week. He was diagnosed with COVID-19 but states that he continued to feel worse so he sought medical attention in the emergency department. The patient was not vaccinated. He was found to be in atrial fibrillation with a rapid ventricular response prompting consultation. He denies palpitations or an awareness of his heart rate being elevated. He is still in atrial fibrillation and denies chest pains but does have pleuritic chest pain with coughing. Prior to his acute illness, he denied exertional chest pains, dyspnea on exertion, palpitations, lower extremity edema, PND, or orthopnea. He denies a prior diagnosis of atrial fibrillation. The patient feels short of breath at rest.  Reportedly, his oxygen levels dropped with ambulation but are normal at rest.    Interval history:  No acute overnight cardiac events. Patient remains in atrial fibrillation but rate controlled. In general, he states that he still feeling poorly. He endorses cough, shortness of breath, generalized malaise. He denies palpitations. Patient now requiring supplemental oxygen. Past Medical History:   has a past medical history of COVID-19. Surgical History:   has a past surgical history that includes Tonsillectomy. Social History:   reports that he has never smoked. He has never used smokeless tobacco. He reports previous drug use. He reports that he does not drink alcohol. Family History:  family history includes Diabetes in his mother; Heart Disease in his brother; High Blood Pressure in his mother.     Home Medications:  Were reviewed and are listed in nursing record and/or below  Prior to Admission medications    Medication Sig Start Date End Date Taking? Authorizing Provider   predniSONE (DELTASONE) 10 MG tablet Take 4 tablets by mouth each morning for 4 days, then 3, 2, 1, and 1/2 tablets daily each morning for 4 days each. 7/17/21   ROSELIA Siegel   triamcinolone (KENALOG) 0.1 % cream Apply topically 2 times daily Apply topically 2 times daily. 7/17/21   ROSELIA Siegel   potassium chloride (KLOR-CON M) 10 MEQ extended release tablet Take 1 tablet by mouth daily for 10 days 6/16/21 6/26/21  Veronique Taylor MD   spironolactone (ALDACTONE) 25 MG tablet Take 1 tablet by mouth daily 6/14/21   Veronique Taylor MD   aspirin 81 MG EC tablet Take 81 mg by mouth daily     Historical Provider, MD        CURRENT Medications:  sodium chloride flush 0.9 % injection 10 mL, 2 times per day  [Held by provider] sodium chloride flush 0.9 % injection 10 mL, PRN  0.9 % sodium chloride infusion, PRN  ondansetron (ZOFRAN) injection 4 mg, Q4H PRN  polyethylene glycol (GLYCOLAX) packet 17 g, Daily PRN  acetaminophen (TYLENOL) tablet 650 mg, Q4H PRN   Or  acetaminophen (TYLENOL) suppository 650 mg, Q4H PRN  enoxaparin (LOVENOX) injection 90 mg, BID  dilTIAZem (CARDIZEM) tablet 30 mg, 4 times per day  amiodarone (CORDARONE) 450 mg in dextrose 5 % 250 mL infusion, Continuous  dexamethasone (DECADRON) tablet 6 mg, Daily    Allergies:  Amoxicillin and Pcn [penicillins]     Review of Systems:   · Constitutional: no unanticipated weight loss. There's been no change in energy level, sleep pattern, or activity level. No fevers, chills. · Eyes: No visual changes or diplopia. No scleral icterus. · ENT: No Headaches, hearing loss or vertigo. No mouth sores or sore throat. · Cardiovascular: as reviewed in HPI  · Respiratory: No cough or wheezing, no sputum production. No hemoptysis. · Gastrointestinal: No abdominal pain, appetite loss, blood in stools. No change in bowel or bladder habits.   · Genitourinary: No dysuria, trouble voiding, or hematuria. · Musculoskeletal:  No gait disturbance, no joint complaints. · Integumentary: No rash or pruritis. · Neurological: No headache, diplopia, change in muscle strength, numbness or tingling. · Psychiatric: No anxiety or depression. · Endocrine: No temperature intolerance. No excessive thirst, fluid intake, or urination. No tremor. · Hematologic/Lymphatic: No abnormal bruising or bleeding, blood clots or swollen lymph nodes. · Allergic/Immunologic: No nasal congestion or hives. Objective:   PHYSICAL EXAM:    Vitals:    11/14/21 0910   BP: 112/68   Pulse: 71   Resp: 20   Temp: 97.4 °F (36.3 °C)   SpO2: 93%    Weight: 213 lb 6.5 oz (96.8 kg)       General Appearance:  Alert, cooperative, no respiratory distress but acutely ill-appearing, appears stated age. Coughing. Head:  Normocephalic, without obvious abnormality, atraumatic. Eyes:  Pupils equal and round. No scleral icterus. Mouth: Moist mucosa, no pharyngeal erythema. Nose: Nares normal. No drainage or sinus tenderness. Neck: Supple, symmetrical, trachea midline. No adenopathy. No tenderness/mass/nodules. No carotid bruit or elevated JVD. Lungs:   Respirations unlabored at rest.  +rhonchi. Chest Wall:  No tenderness or deformity. Heart:  Irregularly irregular with a normal rate. S1/S2 normal. No murmur, rub, or gallop. Abdomen:   Soft, non-tender, bowel sounds active. Musculoskeletal: No muscle wasting or digital clubbing. Extremities: Extremities normal, atraumatic. No cyanosis or edema. Pulses: 2+ radial and carotid pulses, symmetric. Skin: No rashes or lesions. Pysch: Normal mood and affect. Alert and oriented x 4.    Neurologic: Normal gross motor and sensory exam.       Labs     CBC:   Lab Results   Component Value Date    WBC 5.6 11/14/2021    RBC 5.69 11/14/2021    HGB 15.9 11/14/2021    HCT 47.5 11/14/2021    MCV 83.5 11/14/2021    RDW 15.0 11/14/2021     11/14/2021 CMP:  Lab Results   Component Value Date     11/14/2021    K 4.5 11/14/2021     11/14/2021    CO2 21 11/14/2021    BUN 14 11/14/2021    CREATININE 1.0 11/14/2021    GFRAA >60 11/14/2021    AGRATIO 1.4 03/18/2020    LABGLOM >60 11/14/2021    GLUCOSE 140 11/14/2021    PROT 7.2 03/18/2020    CALCIUM 8.2 11/14/2021    BILITOT 0.6 03/18/2020    ALKPHOS 91 03/18/2020    AST 19 03/18/2020    ALT 18 03/18/2020     PT/INR:  No results found for: PTINR  HgBA1c:No results found for: LABA1C  Lab Results   Component Value Date    CKTOTAL 1,021 (H) 08/12/2015    TROPONINI <0.01 11/13/2021       Cardiac Data     Telemetry: Personally interpreted. Atrial fibrillation with normal rate. Assessment and Plan   1) Atrial fibrillation with rapid ventricular response. Symptoms more likely from his COVID-19 pneumonia. CHADS-Vasc is one (HTN). Actual onset unknown. On therapeutic anticoagulation with Lovenox but will transition to Eliquis 5 mg twice daily. Patient became hypotensive with IV Cardizem and started on IV amiodarone. Resumed oral Cardizem for improved rate control. Will discontinue amiodarone if remains rate controlled and hemodynamically stable on CCB. An echocardiogram would be appropriate but would defer until after patient has recovered from his Covid infection. No emergent indication for electrical cardioversion. 2) Acute respiratory failure with hypoxia/COVID-19 pneumonia. Will defer management to primary team.    3) Essential hypertension. Controlled. Goal BP <130/80. Continue medical therapy. Overall, the problems requiring hospitalization are high in severity. Thank you for allowing us to participate in the care of Hughie Mcardle. Sweetie Clark, [address] [place] [address]  11/14/2021 9:13 AM

## 2021-11-14 NOTE — PLAN OF CARE
Problem: Falls - Risk of:  Goal: Will remain free from falls  Description: Will remain free from falls  11/14/2021 1409 by Srinivasan Love RN  Outcome: Ongoing    Goal: Absence of physical injury  Description: Absence of physical injury  11/14/2021 1409 by Srinivasan Love RN  Outcome: Ongoing       Problem: Airway Clearance - Ineffective  Goal: Achieve or maintain patent airway  Outcome: Ongoing     Problem: Gas Exchange - Impaired  Goal: Absence of hypoxia  Outcome: Ongoing  Goal: Promote optimal lung function  Outcome: Ongoing     Problem: Breathing Pattern - Ineffective  Goal: Ability to achieve and maintain a regular respiratory rate  Outcome: Ongoing     Problem:  Body Temperature -  Risk of, Imbalanced  Goal: Ability to maintain a body temperature within defined limits  Outcome: Ongoing  Goal: Will regain or maintain usual level of consciousness  Outcome: Ongoing  Goal: Complications related to the disease process, condition or treatment will be avoided or minimized  Outcome: Ongoing     Problem: Isolation Precautions - Risk of Spread of Infection  Goal: Prevent transmission of infection  Outcome: Ongoing     Problem: Risk for Fluid Volume Deficit  Goal: Maintain normal heart rhythm  Outcome: Ongoing  Goal: Maintain absence of muscle cramping  Outcome: Ongoing  Goal: Maintain normal serum potassium, sodium, calcium, phosphorus, and pH  Outcome: Ongoing     Problem: Fatigue  Goal: Verbalize increase energy and improved vitality  Outcome: Ongoing

## 2021-11-14 NOTE — PROGRESS NOTES
Progress Note  Admit Date: 11/12/2021      PCP: Thomas Nguyen MD     CC: F/U for for for diarrhea    Days in hospital:  1    SUBJECTIVE / Interval History:  Patient feels tired. Poor appetite. Eating 3 L of oxygen        Allergies  Amoxicillin and Pcn [penicillins]    Medications    Scheduled Meds:   sodium chloride flush  10 mL IntraVENous 2 times per day    enoxaparin  1 mg/kg SubCUTAneous BID    dilTIAZem  30 mg Oral 4 times per day    dexamethasone  6 mg Oral Daily     Continuous Infusions:   sodium chloride      amiodarone 0.501 mg/min (11/14/21 0500)       PRN Meds:  [Held by provider] sodium chloride flush, sodium chloride, ondansetron, polyethylene glycol, acetaminophen **OR** acetaminophen    Vitals    /68   Pulse 71   Temp 97.4 °F (36.3 °C) (Oral)   Resp 20   Ht 5' 11\" (1.803 m)   Wt 213 lb 6.5 oz (96.8 kg)   SpO2 93%   BMI 29.76 kg/m²     Exam:    Gen: No distress. Eyes: PERRL. No sclera icterus. No conjunctival injection. ENT: No discharge. Pharynx clear. External appearance of ears and nose normal.  Neck: Trachea midline. No obvious mass. Resp: No accessory muscle use. No crackles. No wheezes. No rhonchi. No dullness on percussion. CV: Regular rate. Regular rhythm. No murmur or rub. No edema. GI: Non-tender. Non-distended. No hernia. Skin: Warm, dry, normal texture and turgor. No nodule on exposed extremities. Lymph: No cervical LAD. No supraclavicular LAD. M/S: No cyanosis. No clubbing. No joint deformity. Neuro: Moves all four extremities. CN 2-12 tested, no defect noted. Psych: Oriented x 3. No anxiety. Awake. Alert. Intact judgement and insight.     Data    LABS  CBC:   Recent Labs     11/13/21  0009 11/13/21  0530 11/14/21  0712   WBC 4.1 4.2 5.6   HGB 16.2 15.2 15.9   HCT 49.1 44.4 47.5   MCV 83.6 82.5 83.5   PLT see below 106* 132*     BMP:   Recent Labs     11/13/21  0009 11/13/21  0530 11/14/21  0712   * 132* 134*   K 4.1 4.0 4.5   CL 98* 100 101 CO2 19* 19* 21   BUN 12 13 14   CREATININE 1.2 1.1 1.0   GLUCOSE 120* 126* 140*     POC GLUCOSE:  No results for input(s): POCGLU in the last 72 hours. LIVER PROFILE: No results for input(s): AST, ALT, LIPASE, AMYLASE, LABALBU, BILIDIR, BILITOT, ALKPHOS in the last 72 hours. PT/INR: No results for input(s): PROTIME, INR in the last 72 hours. APTT: No results for input(s): APTT in the last 72 hours. UA:No results for input(s): NITRITE, COLORU, PHUR, LABCAST, WBCUA, RBCUA, MUCUS, TRICHOMONAS, YEAST, BACTERIA, CLARITYU, SPECGRAV, LEUKOCYTESUR, UROBILINOGEN, BILIRUBINUR, BLOODU, GLUCOSEU, KETUA, AMORPHOUS in the last 72 hours. Microbiology:  Wound Culture: No results for input(s): WNDABS, ORG in the last 72 hours. Invalid input(s):  LABGRAM  Nasal Culture: No results for input(s): ORG, MRSAPCR in the last 72 hours. Blood Culture: No results for input(s): BC, BLOODCULT2 in the last 72 hours. Fungal Culture:   No results for input(s): FUNGSM in the last 72 hours. No results for input(s): FUNCXBLD in the last 72 hours. CSF Culture:  No results for input(s): COLORCSF, APPEARCSF, CFTUBE, CLOTCSF, WBCCSF, RBCCSF, NEUTCSF, NUMCELLSCSF, LYMPHSCSF, MONOCSF, GLUCCSF, VOLCSF in the last 72 hours. Respiratory Culture:  No results for input(s): Sound[place] Counter in the last 72 hours. AFB:No results for input(s): AFBSMEAR in the last 72 hours. Urine Culture  No results for input(s): LABURIN in the last 72 hours. RADIOLOGY:    XR CHEST PORTABLE   Final Result   Multifocal pneumonia in keeping with history of COVID-19. CONSULTS:    IP CONSULT TO CARDIOLOGY  IP CONSULT TO PHARMACY    ASSESSMENT AND PLAN:      Active Problems:    Atrial fibrillation with rapid ventricular response ([address]    Pneumonia due to COVID-19 virus    Essential hypertension  Resolved Problems:    * No resolved hospital problems.  *    Patient is a 71-year-old male who presented with chest pain shortness of breath and fever for the last 7 to 10 days. In the ER patient was found to have Covid pneumonia with A. fib with RVR. Patient continued to be hypoxic. A. fib with RVR-  -Heart rate now controlled. Continue p.o. Cardizem  -Wean off amiodarone drip  -Joel vas score 1  -On therapeutic Lovenox    Sepsis present on admission  -With fever and tachycardia and tachypnea  -Secondary to Covid     Acute hypoxic respiratory failure  -Patient's oxygen was 86% on room air. Improved but then dropped again to 89% with ambulation  -Wean as tolerated        COVID-19 pneumonia  -With hypoxia we'll start steroids( D2)  -If patient's oxygen trends up will start baricitinib  -Inflammatory markers noted      DVT Prophylaxis: Lovenox  Diet: ADULT DIET; Regular  Code Status: Full Code    PT/OT Eval Status:    Discharge plan - ct care    The patient and / or the family were informed of the results of any tests, a time was given to answer questions, a plan was proposed and they agreed with plan. Discussed with consulting physicians, nursing and social work     The note was completed using EMR. Every effort was made to ensure accuracy; however, inadvertent computerized transcription errors may be present.        Ashlyn Reddy MD

## 2021-11-15 LAB
ACANTHOCYTES: ABNORMAL
ALBUMIN SERPL-MCNC: 3.2 G/DL (ref 3.4–5)
ALP BLD-CCNC: 56 U/L (ref 40–129)
ALT SERPL-CCNC: 66 U/L (ref 10–40)
ANION GAP SERPL CALCULATED.3IONS-SCNC: 14 MMOL/L (ref 3–16)
AST SERPL-CCNC: 86 U/L (ref 15–37)
BASOPHILS ABSOLUTE: 0 K/UL (ref 0–0.2)
BASOPHILS RELATIVE PERCENT: 0.1 %
BILIRUB SERPL-MCNC: 0.5 MG/DL (ref 0–1)
BILIRUBIN DIRECT: <0.2 MG/DL (ref 0–0.3)
BILIRUBIN, INDIRECT: ABNORMAL MG/DL (ref 0–1)
BUN BLDV-MCNC: 23 MG/DL (ref 7–20)
BURR CELLS: ABNORMAL
C-REACTIVE PROTEIN: 19.9 MG/L (ref 0–5.1)
CALCIUM SERPL-MCNC: 8.6 MG/DL (ref 8.3–10.6)
CHLORIDE BLD-SCNC: 97 MMOL/L (ref 99–110)
CO2: 20 MMOL/L (ref 21–32)
CREAT SERPL-MCNC: 1.1 MG/DL (ref 0.8–1.3)
D DIMER: 443 NG/ML DDU (ref 0–229)
EKG DIAGNOSIS: NORMAL
EKG Q-T INTERVAL: 292 MS
EKG QRS DURATION: 74 MS
EKG QTC CALCULATION (BAZETT): 440 MS
EKG R AXIS: 5 DEGREES
EKG T AXIS: 19 DEGREES
EKG VENTRICULAR RATE: 137 BPM
EOSINOPHILS ABSOLUTE: 0 K/UL (ref 0–0.6)
EOSINOPHILS RELATIVE PERCENT: 0 %
GFR AFRICAN AMERICAN: >60
GFR NON-AFRICAN AMERICAN: >60
GLUCOSE BLD-MCNC: 120 MG/DL (ref 70–99)
HCT VFR BLD CALC: 45.7 % (ref 40.5–52.5)
HEMOGLOBIN: 15.5 G/DL (ref 13.5–17.5)
LYMPHOCYTES ABSOLUTE: 1 K/UL (ref 1–5.1)
LYMPHOCYTES RELATIVE PERCENT: 10 %
MCH RBC QN AUTO: 27.9 PG (ref 26–34)
MCHC RBC AUTO-ENTMCNC: 33.8 G/DL (ref 31–36)
MCV RBC AUTO: 82.5 FL (ref 80–100)
MONOCYTES ABSOLUTE: 1.2 K/UL (ref 0–1.3)
MONOCYTES RELATIVE PERCENT: 11 %
NEUTROPHILS ABSOLUTE: 8.3 K/UL (ref 1.7–7.7)
NEUTROPHILS RELATIVE PERCENT: 78.9 %
OVALOCYTES: ABNORMAL
PDW BLD-RTO: 14.9 % (ref 12.4–15.4)
PLATELET # BLD: 168 K/UL (ref 135–450)
PLATELET SLIDE REVIEW: ADEQUATE
PMV BLD AUTO: 10.1 FL (ref 5–10.5)
POIKILOCYTES: ABNORMAL
POTASSIUM REFLEX MAGNESIUM: 4.6 MMOL/L (ref 3.5–5.1)
RBC # BLD: 5.55 M/UL (ref 4.2–5.9)
SLIDE REVIEW: ABNORMAL
SODIUM BLD-SCNC: 131 MMOL/L (ref 136–145)
TOTAL PROTEIN: 6.4 G/DL (ref 6.4–8.2)
WBC # BLD: 10.5 K/UL (ref 4–11)

## 2021-11-15 PROCEDURE — 6370000000 HC RX 637 (ALT 250 FOR IP): Performed by: INTERNAL MEDICINE

## 2021-11-15 PROCEDURE — 94761 N-INVAS EAR/PLS OXIMETRY MLT: CPT

## 2021-11-15 PROCEDURE — 85025 COMPLETE CBC W/AUTO DIFF WBC: CPT

## 2021-11-15 PROCEDURE — 93010 ELECTROCARDIOGRAM REPORT: CPT | Performed by: INTERNAL MEDICINE

## 2021-11-15 PROCEDURE — 2580000003 HC RX 258: Performed by: INTERNAL MEDICINE

## 2021-11-15 PROCEDURE — 2700000000 HC OXYGEN THERAPY PER DAY

## 2021-11-15 PROCEDURE — 99232 SBSQ HOSP IP/OBS MODERATE 35: CPT | Performed by: NURSE PRACTITIONER

## 2021-11-15 PROCEDURE — 2060000000 HC ICU INTERMEDIATE R&B

## 2021-11-15 PROCEDURE — 85379 FIBRIN DEGRADATION QUANT: CPT

## 2021-11-15 PROCEDURE — 80076 HEPATIC FUNCTION PANEL: CPT

## 2021-11-15 PROCEDURE — 36415 COLL VENOUS BLD VENIPUNCTURE: CPT

## 2021-11-15 PROCEDURE — 80048 BASIC METABOLIC PNL TOTAL CA: CPT

## 2021-11-15 PROCEDURE — 86140 C-REACTIVE PROTEIN: CPT

## 2021-11-15 RX ADMIN — DEXAMETHASONE 6 MG: 6 TABLET ORAL at 08:55

## 2021-11-15 RX ADMIN — SODIUM CHLORIDE, PRESERVATIVE FREE 10 ML: 5 INJECTION INTRAVENOUS at 08:57

## 2021-11-15 RX ADMIN — DILTIAZEM HYDROCHLORIDE 120 MG: 120 CAPSULE, COATED, EXTENDED RELEASE ORAL at 08:55

## 2021-11-15 RX ADMIN — APIXABAN 5 MG: 5 TABLET, FILM COATED ORAL at 21:21

## 2021-11-15 RX ADMIN — APIXABAN 5 MG: 5 TABLET, FILM COATED ORAL at 08:55

## 2021-11-15 RX ADMIN — SODIUM CHLORIDE, PRESERVATIVE FREE 10 ML: 5 INJECTION INTRAVENOUS at 21:21

## 2021-11-15 RX ADMIN — BARICITINIB 4 MG: 2 TABLET, FILM COATED ORAL at 15:12

## 2021-11-15 ASSESSMENT — PAIN SCALES - GENERAL
PAINLEVEL_OUTOF10: 0

## 2021-11-15 NOTE — CARE COORDINATION
INITIAL CASE MANAGEMENT ASSESSMENT    Unable to meet with patient due to isolation status. Call to patient's room, spoke with patient over the phone to assess possible discharge needs. Explained Case Management role/services. Living Situation:Verified demographics. Patient lives alone, in a house. Patient reports No concerns with mobility. ADLs: Independent      DME: Now on 3LO2    PT/OT Recs: patient up independently in room      Active Services: None      Transportation: Patient drives self; unsure about family transport   Possible neighbor      Medications: Hali Fisher     PCP: Magali Atkins MD      HD/PD: n/a     PLAN/COMMENTS: Return home independently at discharge  ACP completed with patient  1) No insurance - patient reports they spoke to financial   2) 3LO2 - no oxygen prior to admission    SW provided contact information for patient or family to call with any questions. SW will follow and assist as needed.     OLIVE Comer, NÉSTOR, Social Work/Case Management   [phone]  Electronically signed by OLIVE Comer LSW on 11/15/2021 at 12:59 PM

## 2021-11-15 NOTE — PROGRESS NOTES
Cardiac Electrophysiology Progress Note     Admit Date: 2021     Reason for follow up: New A fib with RVR    HPI and Interval History:   Per Dr. Indra Jacinto Tete Christensen is a 62 y.o. patient who presented to the hospital with complaints of generalized malaise, cough, and shortness of breath for the past week. He was diagnosed with COVID-19 but states that he continued to feel worse so he sought medical attention in the emergency department. The patient was not vaccinated. He was found to be in atrial fibrillation with a rapid ventricular response prompting consultation. \" Was treated with IV Cardizem but had hypotension so this was changed to IV amiodarone, which was stopped on . Currently on PO Cardizem for rate control. Remains in A fib on monitor, rate has been well controlled in the 80s-90s. On 3L O2. Sitting up eating in bed. Physical Examination:  Vitals:    11/15/21 0733   BP: 112/74   Pulse: 72   Resp: 16   Temp: 98.4 °F (36.9 °C)   SpO2: 92%        Intake/Output Summary (Last 24 hours) at 11/15/2021 0900  Last data filed at 11/15/2021 0554  Gross per 24 hour   Intake 600 ml   Output --   Net 600 ml     In: 600 [P.O.:600]  Out: -    Wt Readings from Last 3 Encounters:   11/15/21 217 lb 2.5 oz (98.5 kg)   21 216 lb 14.9 oz (98.4 kg)   21 220 lb (99.8 kg)     Temp  Av.6 °F (36.4 °C)  Min: 97.4 °F (36.3 °C)  Max: 98.4 °F (36.9 °C)  Pulse  Av.7  Min: 71  Max: 92  BP  Min: 97/70  Max: 113/76  SpO2  Av.9 %  Min: 91 %  Max: 95 %    · Telemetry: Atrial fibrillation v-rates 80s. · Constitutional: Alert, in no acute distress. Appears stated age. · Head: Normocephalic and atraumatic. · Psychiatric: No anxiety or agitation. Limited physical exam due to positive Covid status. Labs, diagnostic and imaging results reviewed. Reviewed.    Recent Labs     21  0530 21  0712 11/15/21  0635   * 134* 131*   K 4.0 4.5 4.6    101 97*   CO2 19* 21 20* BUN 13 14 23*   CREATININE 1.1 1.0 1.1     Recent Labs     21  0530 21  0712 11/15/21  0635   WBC 4.2 5.6 10.5   HGB 15.2 15.9 15.5   HCT 44.4 47.5 45.7   MCV 82.5 83.5 82.5   * 132* 168     Lab Results   Component Value Date    CKTOTAL 1,021 2015    TROPONINI <0.01 2021     Estimated Creatinine Clearance: 85 mL/min (based on SCr of 1.1 mg/dL). No results found for: BNP  Lab Results   Component Value Date    PROTIME 11.5 2015    INR 1.06 2015     Lab Results   Component Value Date    CHOL 119 2020    HDL 32 2020    TRIG 68 2020       Scheduled Meds:   dilTIAZem  120 mg Oral Daily    baricitinib  4 mg Oral Q24H    apixaban  5 mg Oral BID    sodium chloride flush  10 mL IntraVENous 2 times per day    dexamethasone  6 mg Oral Daily     Continuous Infusions:   sodium chloride      amiodarone Stopped (21 1417)     PRN Meds:[Held by provider] sodium chloride flush, sodium chloride, ondansetron, polyethylene glycol, acetaminophen **OR** acetaminophen     EC21  Atrial fibrillation at 137 BPM.     Assessment and Plan:     New onset atrial fibrillation with RVR   - first noted on EKG on 21 when he presented to the hospital so actual duration is unknown   - likely brought on by acute illness related to his Covid infection   - on 120mg Cardizem for rate control, continue as rate has been well controlled   - CHADS2-VASc 1 (HTN) on Eliquis 5mg BID   - plan for outpatient echo once recovered from Covid   - if does not convert on own, plan for outpatient cardioversion once Covid resolved     Covid pneumonia   - on Decadron, baricitinib   - care per primary    Acute hypoxic respiratory failure   - secondary to above   - on 3L O2   - care per primary    Essential HTN   - controlled, goal BP <130/80   - continue current medications    EP issues are stable, will sign off. Please call with concerns.     BALAJI Celeste  [facility] [address]  Phone: [phone]  Fax: [phone]    Electronically signed by BALAJI Foster CNP on 11/15/2021 at 9:00 AM

## 2021-11-15 NOTE — ACP (ADVANCE CARE PLANNING)
Advance Care Planning     Advance Care Planning Activator (Inpatient)  Conversation Note      Date of ACP Conversation: 11/15/2021     Conversation Conducted with: Patient with Decision Making Capacity    ACP Activator: OLIVE Shah, [facility] [place] Decision Maker:     Current Designated Health Care Decision Maker:     Primary Decision Maker: Kelsy Thomas - Caitlyn - [phone]    Today we documented Decision Maker(s) consistent with Legal Next of Kin hierarchy. Care Preferences    Ventilation: \"If you were in your present state of health and suddenly became very ill and were unable to breathe on your own, what would your preference be about the use of a ventilator (breathing machine) if it were available to you? \"      Would the patient desire the use of ventilator (breathing machine)?: unsure     \"If your health worsens and it becomes clear that your chance of recovery is unlikely, what would your preference be about the use of a ventilator (breathing machine) if it were available to you? \"     Would the patient desire the use of ventilator (breathing machine)?: unsure       Resuscitation  \"CPR works best to restart the heart when there is a sudden event, like a heart attack, in someone who is otherwise healthy. Unfortunately, CPR does not typically restart the heart for people who have serious health conditions or who are very sick. \"    \"In the event your heart stopped as a result of an underlying serious health condition, would you want attempts to be made to restart your heart (answer \"yes\" for attempt to resuscitate) or would you prefer a natural death (answer \"no\" for do not attempt to resuscitate)? \" yes       [] Yes   [x] No   Educated Patient / Hola Mac regarding differences between Advance Directives and portable DNR orders.     Length of ACP Conversation in minutes:  5    Conversation Outcomes:  [x] ACP discussion completed  [] Existing advance directive reviewed with patient; no changes to patient's previously recorded wishes  [] New Advance Directive completed  [] Portable Do Not Rescitate prepared for Provider review and signature  [] POLST/POST/MOLST/MOST prepared for Provider review and signature      Follow-up plan:    [] Schedule follow-up conversation to continue planning  [] Referred individual to Provider for additional questions/concerns   [] Advised patient/agent/surrogate to review completed ACP document and update if needed with changes in condition, patient preferences or care setting    [x] This note routed to one or more involved healthcare providers       Electronically signed by OLIVE Montaño, NÉSTOR on 11/15/2021 at 1:08 PM

## 2021-11-15 NOTE — PLAN OF CARE
Problem: Falls - Risk of:  Goal: Will remain free from falls  Description: Will remain free from falls  11/15/2021 0251 by Tessa Duron RN  Outcome: Ongoing     Problem: Falls - Risk of:  Goal: Absence of physical injury  Description: Absence of physical injury  11/15/2021 0251 by Tessa Duron RN  Outcome: Ongoing     Problem: Airway Clearance - Ineffective  Goal: Achieve or maintain patent airway  11/15/2021 0251 by Tessa Duron RN  Outcome: Ongoing     Problem: Gas Exchange - Impaired  Goal: Absence of hypoxia  11/15/2021 0251 by Tessa Duron RN  Outcome: Ongoing     Problem: Gas Exchange - Impaired  Goal: Promote optimal lung function  11/15/2021 0251 by Tessa Duron RN  Outcome: Ongoing     Problem: Breathing Pattern - Ineffective  Goal: Ability to achieve and maintain a regular respiratory rate  11/15/2021 0251 by Tessa Duron RN  Outcome: Ongoing     Problem: Body Temperature -  Risk of, Imbalanced  Goal: Ability to maintain a body temperature within defined limits  11/15/2021 0251 by Tessa Duron RN  Outcome: Ongoing     Problem: Body Temperature -  Risk of, Imbalanced  Goal: Will regain or maintain usual level of consciousness  11/15/2021 0251 by Tessa Duron RN  Outcome: Ongoing     Problem:  Body Temperature -  Risk of, Imbalanced  Goal: Complications related to the disease process, condition or treatment will be avoided or minimized  11/15/2021 0251 by Tessa Duron RN  Outcome: Ongoing     Problem: Isolation Precautions - Risk of Spread of Infection  Goal: Prevent transmission of infection  11/15/2021 0251 by Tessa Duron RN  Outcome: Ongoing     Problem: Nutrition Deficits  Goal: Optimize nutritional status  Outcome: Ongoing     Problem: Risk for Fluid Volume Deficit  Goal: Maintain normal heart rhythm  11/15/2021 0251 by Tessa Duron RN  Outcome: Ongoing     Problem: Risk for Fluid Volume Deficit  Goal: Maintain absence of muscle cramping  11/15/2021 0251 by Javier Dejesus RN  Outcome: Ongoing     Problem: Risk for Fluid Volume Deficit  Goal: Maintain normal serum potassium, sodium, calcium, phosphorus, and pH  11/15/2021 0251 by Javier Dejesus RN  Outcome: Ongoing     Problem: Loneliness or Risk for Loneliness  Goal: Demonstrate positive use of time alone when socialization is not possible  Outcome: Ongoing     Problem: Fatigue  Goal: Verbalize increase energy and improved vitality  11/15/2021 0251 by Javier Dejesus RN  Outcome: Ongoing     Problem: Patient Education: Go to Patient Education Activity  Goal: Patient/Family Education  Outcome: Ongoing

## 2021-11-15 NOTE — PROGRESS NOTES
Progress Note  Admit Date: 11/12/2021      PCP: Mehreen Alva MD     CC: F/U for for for diarrhea    Days in hospital:  2    SUBJECTIVE / Interval History:  Patient feels tired. Poor appetite. On 3 L of oxygen        Allergies  Amoxicillin and Pcn [penicillins]    Medications    Scheduled Meds:   dilTIAZem  120 mg Oral Daily    baricitinib  4 mg Oral Q24H    apixaban  5 mg Oral BID    sodium chloride flush  10 mL IntraVENous 2 times per day    dexamethasone  6 mg Oral Daily     Continuous Infusions:   sodium chloride      amiodarone Stopped (11/14/21 1417)       PRN Meds:  [Held by provider] sodium chloride flush, sodium chloride, ondansetron, polyethylene glycol, acetaminophen **OR** acetaminophen    Vitals    /74   Pulse 72   Temp 98.4 °F (36.9 °C) (Oral)   Resp 16   Ht 5' 11\" (1.803 m)   Wt 217 lb 2.5 oz (98.5 kg)   SpO2 92%   BMI 30.29 kg/m²     Exam:    Gen: No distress. Eyes: PERRL. No sclera icterus. No conjunctival injection. ENT: No discharge. Pharynx clear. External appearance of ears and nose normal.  Neck: Trachea midline. No obvious mass. Resp: No accessory muscle use. No crackles. No wheezes. No rhonchi. No dullness on percussion. CV: Regular rate. Regular rhythm. No murmur or rub. No edema. GI: Non-tender. Non-distended. No hernia. Skin: Warm, dry, normal texture and turgor. No nodule on exposed extremities. Lymph: No cervical LAD. No supraclavicular LAD. M/S: No cyanosis. No clubbing. No joint deformity. Neuro: Moves all four extremities. CN 2-12 tested, no defect noted. Psych: Oriented x 3. No anxiety. Awake. Alert. Intact judgement and insight.     Data    LABS  CBC:   Recent Labs     11/13/21  0530 11/14/21  0712 11/15/21  0635   WBC 4.2 5.6 10.5   HGB 15.2 15.9 15.5   HCT 44.4 47.5 45.7   MCV 82.5 83.5 82.5   * 132* 168     BMP:   Recent Labs     11/13/21  0530 11/14/21  0712 11/15/21  0635   * 134* 131*   K 4.0 4.5 4.6    101 97*   CO2 19* 21 20*   BUN 13 14 23*   CREATININE 1.1 1.0 1.1   GLUCOSE 126* 140* 120*     POC GLUCOSE:  No results for input(s): POCGLU in the last 72 hours. LIVER PROFILE: No results for input(s): AST, ALT, LIPASE, AMYLASE, LABALBU, BILIDIR, BILITOT, ALKPHOS in the last 72 hours. PT/INR: No results for input(s): PROTIME, INR in the last 72 hours. APTT: No results for input(s): APTT in the last 72 hours. UA:No results for input(s): NITRITE, COLORU, PHUR, LABCAST, WBCUA, RBCUA, MUCUS, TRICHOMONAS, YEAST, BACTERIA, CLARITYU, SPECGRAV, LEUKOCYTESUR, UROBILINOGEN, BILIRUBINUR, BLOODU, GLUCOSEU, KETUA, AMORPHOUS in the last 72 hours. Microbiology:  Wound Culture: No results for input(s): WNDABS, ORG in the last 72 hours. Invalid input(s):  LABGRAM  Nasal Culture: No results for input(s): ORG, MRSAPCR in the last 72 hours. Blood Culture: No results for input(s): BC, BLOODCULT2 in the last 72 hours. Fungal Culture:   No results for input(s): FUNGSM in the last 72 hours. No results for input(s): FUNCXBLD in the last 72 hours. CSF Culture:  No results for input(s): COLORCSF, APPEARCSF, CFTUBE, CLOTCSF, WBCCSF, RBCCSF, NEUTCSF, NUMCELLSCSF, LYMPHSCSF, MONOCSF, GLUCCSF, VOLCSF in the last 72 hours. Respiratory Culture:  No results for input(s): [place] in the last 72 hours. AFB:No results for input(s): AFBSMEAR in the last 72 hours. Urine Culture  No results for input(s): LABURIN in the last 72 hours. RADIOLOGY:    XR CHEST PORTABLE   Final Result   Multifocal pneumonia in keeping with history of COVID-19. CONSULTS:    IP CONSULT TO CARDIOLOGY  IP CONSULT TO PHARMACY    ASSESSMENT AND PLAN:      Principal Problem:    Pneumonia due to COVID-19 virus  Active Problems:    Atrial fibrillation with rapid ventricular response ([address]    Essential hypertension    Acute respiratory failure with hypoxia ([facility] [address]  Resolved Problems:    * No resolved hospital problems.  *    Patient is a 62-year-old male who presented with chest pain shortness of breath and fever for the last 7 to 10 days. In the ER patient was found to have Covid pneumonia with A. fib with RVR. Patient continued to be hypoxic. A. fib with RVR-  -Heart rate now controlled. Continue p.o. Cardizem  -Wean off amiodarone drip  -Joel vas score 1  -On therapeutic Lovenox> eliquis    Sepsis present on admission-improving   -With fever and tachycardia and tachypnea  -Secondary to Covid     Acute hypoxic respiratory failure-needing 3 L  -Patient's oxygen was 86% on room air. Improved but then dropped again to 89% with ambulation  -Wean as tolerated        COVID-19 pneumonia  -With hypoxia we'll start steroids( D3)  -If patient's oxygen trends up will start baricitinib( D2)  -Inflammatory markers noted      DVT Prophylaxis: Lovenox  Diet: ADULT DIET; Regular  Code Status: Full Code    PT/OT Eval Status:    Discharge plan - ct care    The patient and / or the family were informed of the results of any tests, a time was given to answer questions, a plan was proposed and they agreed with plan. Discussed with consulting physicians, nursing and social work     The note was completed using EMR. Every effort was made to ensure accuracy; however, inadvertent computerized transcription errors may be present.        Kareem Khanna MD

## 2021-11-15 NOTE — PLAN OF CARE
Problem: Falls - Risk of:  Goal: Will remain free from falls  Description: Will remain free from falls  Outcome: Ongoing  Goal: Absence of physical injury  Description: Absence of physical injury  Outcome: Ongoing     Problem: Airway Clearance - Ineffective  Goal: Achieve or maintain patent airway  Outcome: Ongoing     Problem: Gas Exchange - Impaired  Goal: Absence of hypoxia  Outcome: Ongoing  Goal: Promote optimal lung function  Outcome: Ongoing

## 2021-11-16 LAB
ALBUMIN SERPL-MCNC: 3.1 G/DL (ref 3.4–5)
ALP BLD-CCNC: 56 U/L (ref 40–129)
ALT SERPL-CCNC: 106 U/L (ref 10–40)
ANION GAP SERPL CALCULATED.3IONS-SCNC: 13 MMOL/L (ref 3–16)
ANISOCYTOSIS: ABNORMAL
AST SERPL-CCNC: 155 U/L (ref 15–37)
BASOPHILS ABSOLUTE: 0 K/UL (ref 0–0.2)
BASOPHILS RELATIVE PERCENT: 0 %
BILIRUB SERPL-MCNC: 0.6 MG/DL (ref 0–1)
BILIRUBIN DIRECT: <0.2 MG/DL (ref 0–0.3)
BILIRUBIN, INDIRECT: ABNORMAL MG/DL (ref 0–1)
BUN BLDV-MCNC: 27 MG/DL (ref 7–20)
CALCIUM SERPL-MCNC: 8.3 MG/DL (ref 8.3–10.6)
CHLORIDE BLD-SCNC: 105 MMOL/L (ref 99–110)
CO2: 21 MMOL/L (ref 21–32)
CREAT SERPL-MCNC: 1.1 MG/DL (ref 0.8–1.3)
EOSINOPHILS ABSOLUTE: 0 K/UL (ref 0–0.6)
EOSINOPHILS RELATIVE PERCENT: 0.1 %
GFR AFRICAN AMERICAN: >60
GFR NON-AFRICAN AMERICAN: >60
GLUCOSE BLD-MCNC: 120 MG/DL (ref 70–99)
HCT VFR BLD CALC: 49.2 % (ref 40.5–52.5)
HEMOGLOBIN: 15.6 G/DL (ref 13.5–17.5)
LYMPHOCYTES ABSOLUTE: 0.7 K/UL (ref 1–5.1)
LYMPHOCYTES RELATIVE PERCENT: 7.9 %
MCH RBC QN AUTO: 27.7 PG (ref 26–34)
MCHC RBC AUTO-ENTMCNC: 31.6 G/DL (ref 31–36)
MCV RBC AUTO: 87.6 FL (ref 80–100)
MONOCYTES ABSOLUTE: 1.2 K/UL (ref 0–1.3)
MONOCYTES RELATIVE PERCENT: 13.9 %
NEUTROPHILS ABSOLUTE: 6.6 K/UL (ref 1.7–7.7)
NEUTROPHILS RELATIVE PERCENT: 78.1 %
PDW BLD-RTO: 15.7 % (ref 12.4–15.4)
PLATELET # BLD: 173 K/UL (ref 135–450)
PLATELET SLIDE REVIEW: ADEQUATE
PMV BLD AUTO: 9.8 FL (ref 5–10.5)
POTASSIUM REFLEX MAGNESIUM: 4.6 MMOL/L (ref 3.5–5.1)
RBC # BLD: 5.62 M/UL (ref 4.2–5.9)
SLIDE REVIEW: ABNORMAL
SODIUM BLD-SCNC: 139 MMOL/L (ref 136–145)
TOTAL PROTEIN: 6.4 G/DL (ref 6.4–8.2)
WBC # BLD: 8.4 K/UL (ref 4–11)

## 2021-11-16 PROCEDURE — 2580000003 HC RX 258: Performed by: INTERNAL MEDICINE

## 2021-11-16 PROCEDURE — 80076 HEPATIC FUNCTION PANEL: CPT

## 2021-11-16 PROCEDURE — 6370000000 HC RX 637 (ALT 250 FOR IP): Performed by: INTERNAL MEDICINE

## 2021-11-16 PROCEDURE — 2060000000 HC ICU INTERMEDIATE R&B

## 2021-11-16 PROCEDURE — 94761 N-INVAS EAR/PLS OXIMETRY MLT: CPT

## 2021-11-16 PROCEDURE — 80048 BASIC METABOLIC PNL TOTAL CA: CPT

## 2021-11-16 PROCEDURE — 36415 COLL VENOUS BLD VENIPUNCTURE: CPT

## 2021-11-16 PROCEDURE — 2700000000 HC OXYGEN THERAPY PER DAY

## 2021-11-16 PROCEDURE — 85025 COMPLETE CBC W/AUTO DIFF WBC: CPT

## 2021-11-16 RX ADMIN — BARICITINIB 4 MG: 2 TABLET, FILM COATED ORAL at 15:11

## 2021-11-16 RX ADMIN — APIXABAN 5 MG: 5 TABLET, FILM COATED ORAL at 08:49

## 2021-11-16 RX ADMIN — DILTIAZEM HYDROCHLORIDE 120 MG: 120 CAPSULE, COATED, EXTENDED RELEASE ORAL at 08:49

## 2021-11-16 RX ADMIN — APIXABAN 5 MG: 5 TABLET, FILM COATED ORAL at 22:02

## 2021-11-16 RX ADMIN — SODIUM CHLORIDE, PRESERVATIVE FREE 10 ML: 5 INJECTION INTRAVENOUS at 22:02

## 2021-11-16 RX ADMIN — SODIUM CHLORIDE, PRESERVATIVE FREE 10 ML: 5 INJECTION INTRAVENOUS at 08:53

## 2021-11-16 RX ADMIN — DEXAMETHASONE 6 MG: 6 TABLET ORAL at 08:49

## 2021-11-16 ASSESSMENT — PAIN SCALES - GENERAL
PAINLEVEL_OUTOF10: 0
PAINLEVEL_OUTOF10: 0

## 2021-11-16 NOTE — PROGRESS NOTES
Progress Note  Admit Date: 11/12/2021      PCP: Radha Green MD     CC: F/U for for for diarrhea    Days in hospital:  3    SUBJECTIVE / Interval History:  Patient feels tired. Poor appetite. On 4 L of oxygen        Allergies  Amoxicillin and Pcn [penicillins]    Medications    Scheduled Meds:   dilTIAZem  120 mg Oral Daily    baricitinib  4 mg Oral Q24H    apixaban  5 mg Oral BID    sodium chloride flush  10 mL IntraVENous 2 times per day    dexamethasone  6 mg Oral Daily     Continuous Infusions:   sodium chloride         PRN Meds:  [Held by provider] sodium chloride flush, sodium chloride, ondansetron, polyethylene glycol, acetaminophen **OR** acetaminophen    Vitals    /68   Pulse 80   Temp 97.4 °F (36.3 °C)   Resp 18   Ht 5' 11\" (1.803 m)   Wt 216 lb 7.9 oz (98.2 kg)   SpO2 95%   BMI 30.19 kg/m²     Exam:    Gen: No distress. Eyes: PERRL. No sclera icterus. No conjunctival injection. ENT: No discharge. Pharynx clear. External appearance of ears and nose normal.  Neck: Trachea midline. No obvious mass. Resp: No accessory muscle use. No crackles. No wheezes. No rhonchi. No dullness on percussion. CV: Regular rate. Regular rhythm. No murmur or rub. No edema. GI: Non-tender. Non-distended. No hernia. Skin: Warm, dry, normal texture and turgor. No nodule on exposed extremities. Lymph: No cervical LAD. No supraclavicular LAD. M/S: No cyanosis. No clubbing. No joint deformity. Neuro: Moves all four extremities. CN 2-12 tested, no defect noted. Psych: Oriented x 3. No anxiety. Awake. Alert. Intact judgement and insight.     Data    LABS  CBC:   Recent Labs     11/14/21  0712 11/15/21  0635 11/16/21  0621   WBC 5.6 10.5 8.4   HGB 15.9 15.5 15.6   HCT 47.5 45.7 49.2   MCV 83.5 82.5 87.6   * 168 173     BMP:   Recent Labs     11/14/21  0712 11/15/21  0635 11/16/21  0621   * 131* 139   K 4.5 4.6 4.6    97* 105   CO2 21 20* 21   BUN 14 23* 27*   CREATININE 1.0 1.1

## 2021-11-16 NOTE — PLAN OF CARE
Problem: Falls - Risk of:  Goal: Will remain free from falls  Description: Will remain free from falls  11/16/2021 0053 by Judge Jens RN  Outcome: Ongoing     Problem: Falls - Risk of:  Goal: Absence of physical injury  Description: Absence of physical injury  11/16/2021 0053 by Judge Jens RN  Outcome: Ongoing     Problem: Airway Clearance - Ineffective  Goal: Achieve or maintain patent airway  11/16/2021 0053 by Judge Jens RN  Outcome: Ongoing     Problem: Gas Exchange - Impaired  Goal: Absence of hypoxia  11/16/2021 0053 by Judge Jens RN  Outcome: Ongoing     Problem: Gas Exchange - Impaired  Goal: Promote optimal lung function  11/16/2021 0053 by Judge Jens RN  Outcome: Ongoing     Problem: Breathing Pattern - Ineffective  Goal: Ability to achieve and maintain a regular respiratory rate  11/16/2021 0053 by Judge Jens RN  Outcome: Ongoing     Problem: Body Temperature -  Risk of, Imbalanced  Goal: Ability to maintain a body temperature within defined limits  11/16/2021 0053 by Judge Jens RN  Outcome: Ongoing     Problem:  Body Temperature -  Risk of, Imbalanced  Goal: Will regain or maintain usual level of consciousness  11/16/2021 0053 by Judge Jens RN  Outcome: Ongoing     Problem: Isolation Precautions - Risk of Spread of Infection  Goal: Prevent transmission of infection  11/16/2021 0053 by Judge Jens RN  Outcome: Ongoing     Problem: Nutrition Deficits  Goal: Optimize nutritional status  11/16/2021 0053 by Judge Jens RN  Outcome: Ongoing     Problem: Risk for Fluid Volume Deficit  Goal: Maintain normal heart rhythm  11/16/2021 0053 by Judge Jens RN  Outcome: Ongoing     Problem: Risk for Fluid Volume Deficit  Goal: Maintain absence of muscle cramping  11/16/2021 0053 by Judge Jens RN  Outcome: Ongoing     Problem: Risk for Fluid Volume Deficit  Goal: Maintain normal serum potassium, sodium, calcium, phosphorus, and pH  11/16/2021 0053 by Rosy David RN  Outcome: Ongoing     Problem: Loneliness or Risk for Loneliness  Goal: Demonstrate positive use of time alone when socialization is not possible  11/16/2021 0053 by Rosy David RN  Outcome: Ongoing     Problem: Fatigue  Goal: Verbalize increase energy and improved vitality  11/16/2021 0053 by Rosy David RN  Outcome: Ongoing     Problem: Patient Education: Go to Patient Education Activity  Goal: Patient/Family Education  11/16/2021 0053 by Rosy David RN  Outcome: Ongoing

## 2021-11-16 NOTE — PLAN OF CARE
Problem: Falls - Risk of:  Goal: Will remain free from falls  Description: Will remain free from falls  Outcome: Ongoing  Goal: Absence of physical injury  Description: Absence of physical injury  Outcome: Ongoing   Fall risk assessment completed every shift. All precautions in place. Pt has call light within reach at all times. Room clear of clutter. Pt aware to call for assistance when getting up.      Problem: Airway Clearance - Ineffective  Goal: Achieve or maintain patent airway  Outcome: Ongoing     Problem: Gas Exchange - Impaired  Goal: Absence of hypoxia  Outcome: Ongoing  Goal: Promote optimal lung function  Outcome: Ongoing     Problem: Breathing Pattern - Ineffective  Goal: Ability to achieve and maintain a regular respiratory rate  Outcome: Ongoing

## 2021-11-17 ENCOUNTER — APPOINTMENT (OUTPATIENT)
Dept: GENERAL RADIOLOGY | Age: 61
DRG: 871 | End: 2021-11-17

## 2021-11-17 LAB
ALBUMIN SERPL-MCNC: 3.2 G/DL (ref 3.4–5)
ALP BLD-CCNC: 62 U/L (ref 40–129)
ALT SERPL-CCNC: 135 U/L (ref 10–40)
ANION GAP SERPL CALCULATED.3IONS-SCNC: 13 MMOL/L (ref 3–16)
AST SERPL-CCNC: 97 U/L (ref 15–37)
BASOPHILS ABSOLUTE: 0 K/UL (ref 0–0.2)
BASOPHILS RELATIVE PERCENT: 0.1 %
BILIRUB SERPL-MCNC: 0.6 MG/DL (ref 0–1)
BILIRUBIN DIRECT: <0.2 MG/DL (ref 0–0.3)
BILIRUBIN, INDIRECT: ABNORMAL MG/DL (ref 0–1)
BUN BLDV-MCNC: 31 MG/DL (ref 7–20)
C-REACTIVE PROTEIN: <3 MG/L (ref 0–5.1)
CALCIUM SERPL-MCNC: 8.2 MG/DL (ref 8.3–10.6)
CHLORIDE BLD-SCNC: 105 MMOL/L (ref 99–110)
CO2: 20 MMOL/L (ref 21–32)
CREAT SERPL-MCNC: 1.2 MG/DL (ref 0.8–1.3)
EOSINOPHILS ABSOLUTE: 0 K/UL (ref 0–0.6)
EOSINOPHILS RELATIVE PERCENT: 0.1 %
GFR AFRICAN AMERICAN: >60
GFR NON-AFRICAN AMERICAN: >60
GLUCOSE BLD-MCNC: 104 MG/DL (ref 70–99)
HCT VFR BLD CALC: 45.9 % (ref 40.5–52.5)
HEMATOLOGY PATH CONSULT: NO
HEMOGLOBIN: 15.4 G/DL (ref 13.5–17.5)
LYMPHOCYTES ABSOLUTE: 0.8 K/UL (ref 1–5.1)
LYMPHOCYTES RELATIVE PERCENT: 6.5 %
MCH RBC QN AUTO: 27.5 PG (ref 26–34)
MCHC RBC AUTO-ENTMCNC: 33.5 G/DL (ref 31–36)
MCV RBC AUTO: 82.2 FL (ref 80–100)
MONOCYTES ABSOLUTE: 1.8 K/UL (ref 0–1.3)
MONOCYTES RELATIVE PERCENT: 15.1 %
NEUTROPHILS ABSOLUTE: 9.4 K/UL (ref 1.7–7.7)
NEUTROPHILS RELATIVE PERCENT: 78.2 %
PDW BLD-RTO: 14.8 % (ref 12.4–15.4)
PLATELET # BLD: 256 K/UL (ref 135–450)
PMV BLD AUTO: 9.7 FL (ref 5–10.5)
POTASSIUM REFLEX MAGNESIUM: 4.6 MMOL/L (ref 3.5–5.1)
RBC # BLD: 5.59 M/UL (ref 4.2–5.9)
SODIUM BLD-SCNC: 138 MMOL/L (ref 136–145)
TOTAL PROTEIN: 6.7 G/DL (ref 6.4–8.2)
WBC # BLD: 12 K/UL (ref 4–11)

## 2021-11-17 PROCEDURE — 2580000003 HC RX 258: Performed by: INTERNAL MEDICINE

## 2021-11-17 PROCEDURE — 80048 BASIC METABOLIC PNL TOTAL CA: CPT

## 2021-11-17 PROCEDURE — 6370000000 HC RX 637 (ALT 250 FOR IP): Performed by: INTERNAL MEDICINE

## 2021-11-17 PROCEDURE — 80076 HEPATIC FUNCTION PANEL: CPT

## 2021-11-17 PROCEDURE — 85025 COMPLETE CBC W/AUTO DIFF WBC: CPT

## 2021-11-17 PROCEDURE — 86140 C-REACTIVE PROTEIN: CPT

## 2021-11-17 PROCEDURE — 36415 COLL VENOUS BLD VENIPUNCTURE: CPT

## 2021-11-17 PROCEDURE — 6360000002 HC RX W HCPCS: Performed by: INTERNAL MEDICINE

## 2021-11-17 PROCEDURE — 71045 X-RAY EXAM CHEST 1 VIEW: CPT

## 2021-11-17 PROCEDURE — 2060000000 HC ICU INTERMEDIATE R&B

## 2021-11-17 RX ADMIN — BARICITINIB 4 MG: 2 TABLET, FILM COATED ORAL at 14:19

## 2021-11-17 RX ADMIN — SODIUM CHLORIDE, PRESERVATIVE FREE 10 ML: 5 INJECTION INTRAVENOUS at 20:31

## 2021-11-17 RX ADMIN — DEXAMETHASONE SODIUM PHOSPHATE 14 MG: 4 INJECTION, SOLUTION INTRA-ARTICULAR; INTRALESIONAL; INTRAMUSCULAR; INTRAVENOUS; SOFT TISSUE at 12:00

## 2021-11-17 RX ADMIN — ACETAMINOPHEN 650 MG: 325 TABLET ORAL at 20:31

## 2021-11-17 RX ADMIN — SODIUM CHLORIDE, PRESERVATIVE FREE 10 ML: 5 INJECTION INTRAVENOUS at 08:41

## 2021-11-17 RX ADMIN — DILTIAZEM HYDROCHLORIDE 120 MG: 120 CAPSULE, COATED, EXTENDED RELEASE ORAL at 08:37

## 2021-11-17 RX ADMIN — DEXAMETHASONE 6 MG: 6 TABLET ORAL at 08:37

## 2021-11-17 RX ADMIN — APIXABAN 5 MG: 5 TABLET, FILM COATED ORAL at 20:31

## 2021-11-17 RX ADMIN — SODIUM CHLORIDE 25 ML: 9 INJECTION, SOLUTION INTRAVENOUS at 11:58

## 2021-11-17 RX ADMIN — APIXABAN 5 MG: 5 TABLET, FILM COATED ORAL at 08:37

## 2021-11-17 ASSESSMENT — PAIN SCALES - GENERAL
PAINLEVEL_OUTOF10: 0

## 2021-11-17 NOTE — PROGRESS NOTES
Progress Note  Admit Date: 11/12/2021      PCP: Dana Burnham MD     CC: F/U for for for diarrhea    Days in hospital:  4    SUBJECTIVE / Interval History:  Patient feels okay. No worsening shortness of breath. Has a cough  Pt o2 worse overnight, now on 10 L         Allergies  Amoxicillin and Pcn [penicillins]    Medications    Scheduled Meds:   dilTIAZem  120 mg Oral Daily    baricitinib  4 mg Oral Q24H    apixaban  5 mg Oral BID    sodium chloride flush  10 mL IntraVENous 2 times per day    dexamethasone  6 mg Oral Daily     Continuous Infusions:   sodium chloride         PRN Meds:  [Held by provider] sodium chloride flush, sodium chloride, ondansetron, polyethylene glycol, acetaminophen **OR** acetaminophen    Vitals    /84   Pulse 92   Temp 98.2 °F (36.8 °C) (Axillary)   Resp 18   Ht 5' 11\" (1.803 m)   Wt 207 lb 3.7 oz (94 kg)   SpO2 92%   BMI 28.90 kg/m²     Exam:    Gen: No distress. Eyes: PERRL. No sclera icterus. No conjunctival injection. ENT: No discharge. Pharynx clear. External appearance of ears and nose normal.  Neck: Trachea midline. No obvious mass. Resp: No accessory muscle use. No crackles. No wheezes. No rhonchi. No dullness on percussion. CV: Regular rate. Regular rhythm. No murmur or rub. No edema. GI: Non-tender. Non-distended. No hernia. Skin: Warm, dry, normal texture and turgor. No nodule on exposed extremities. Lymph: No cervical LAD. No supraclavicular LAD. M/S: No cyanosis. No clubbing. No joint deformity. Neuro: Moves all four extremities. CN 2-12 tested, no defect noted. Psych: Oriented x 3. No anxiety. Awake. Alert. Intact judgement and insight.     Data    LABS  CBC:   Recent Labs     11/15/21  0635 11/16/21  0621 11/17/21  0815   WBC 10.5 8.4 12.0*   HGB 15.5 15.6 15.4   HCT 45.7 49.2 45.9   MCV 82.5 87.6 82.2    173 256     BMP:   Recent Labs     11/15/21  0635 11/16/21  0621 11/17/21  0815   * 139 138   K 4.6 4.6 4.6   CL 97* 105 105   CO2 20* 21 20*   BUN 23* 27* 31*   CREATININE 1.1 1.1 1.2   GLUCOSE 120* 120* 104*     POC GLUCOSE:  No results for input(s): POCGLU in the last 72 hours. LIVER PROFILE:   Recent Labs     11/15/21  0635 11/16/21  0621 11/17/21  0815   AST 86* 155* 97*   ALT 66* 106* 135*   LABALBU 3.2* 3.1* 3.2*   BILIDIR <0.2 <0.2 <0.2   BILITOT 0.5 0.6 0.6   ALKPHOS 56 56 62     PT/INR: No results for input(s): PROTIME, INR in the last 72 hours. APTT: No results for input(s): APTT in the last 72 hours. UA:No results for input(s): NITRITE, COLORU, PHUR, LABCAST, WBCUA, RBCUA, MUCUS, TRICHOMONAS, YEAST, BACTERIA, CLARITYU, SPECGRAV, LEUKOCYTESUR, UROBILINOGEN, BILIRUBINUR, BLOODU, GLUCOSEU, KETUA, AMORPHOUS in the last 72 hours. Microbiology:  Wound Culture: No results for input(s): WNDABS, ORG in the last 72 hours. Invalid input(s):  LABGRAM  Nasal Culture: No results for input(s): ORG, MRSAPCR in the last 72 hours. Blood Culture: No results for input(s): BC, BLOODCULT2 in the last 72 hours. Fungal Culture:   No results for input(s): FUNGSM in the last 72 hours. No results for input(s): FUNCXBLD in the last 72 hours. CSF Culture:  No results for input(s): COLORCSF, APPEARCSF, CFTUBE, CLOTCSF, WBCCSF, RBCCSF, NEUTCSF, NUMCELLSCSF, LYMPHSCSF, MONOCSF, GLUCCSF, VOLCSF in the last 72 hours. Respiratory Culture:  No results for input(s): Laci Raissa in the last 72 hours. AFB:No results for input(s): AFBSMEAR in the last 72 hours. Urine Culture  No results for input(s): LABURIN in the last 72 hours. RADIOLOGY:    XR CHEST PORTABLE   Final Result   Multifocal pneumonia in keeping with history of COVID-19.              CONSULTS:    IP CONSULT TO CARDIOLOGY  IP CONSULT TO PHARMACY    ASSESSMENT AND PLAN:      Principal Problem:    Pneumonia due to COVID-19 virus  Active Problems:    Atrial fibrillation with rapid ventricular response ([address]    Essential hypertension    Acute respiratory failure with hypoxia [facility])  Resolved Problems:    * No resolved hospital problems. *    Patient is a 81-year-old male who presented with chest pain shortness of breath and fever for the last 7 to 10 days. In the ER patient was found to have Covid pneumonia with A. fib with RVR. Patient continued to be hypoxic. A. fib with RVR-now controlled  -Heart rate now controlled. Continue p.o. Cardizem  -Wean off amiodarone drip  -Joel vas score 1  -On therapeutic Lovenox> eliquis    Sepsis present on admission-improving   -With fever and tachycardia and tachypnea  -Secondary to Covid     Acute hypoxic respiratory failure-now worse   -Patient's oxygen was 86% on room air. Improved but then dropped again to 89% with ambulation  -Wean as tolerated        COVID-19 pneumonia  -With hypoxia we'll start steroids( D4)(patient finished 5 days of low-dose steroids but oxygen has gone up from 4 L to 10 L. We will switch to high-dose steroids today. Inflammatory markers reordered0  -If patient's oxygen trends up will start baricitinib( D5)  -Inflammatory markers ordered  -We will consult pulmonology if hypoxic is worse      Given worsening hypoxia prognosis is guarded. Discussed with patient about goals of care and patient is a full code      DVT Prophylaxis: Lovenox  Diet: ADULT DIET; Regular  Code Status: Full Code    PT/OT Eval Status:    Discharge plan - ct care    The patient and / or the family were informed of the results of any tests, a time was given to answer questions, a plan was proposed and they agreed with plan. Discussed with consulting physicians, nursing and social work     The note was completed using EMR. Every effort was made to ensure accuracy; however, inadvertent computerized transcription errors may be present.        Wendy Saba MD

## 2021-11-17 NOTE — PLAN OF CARE
Problem: Falls - Risk of:  Goal: Will remain free from falls  Description: Will remain free from falls  Outcome: Ongoing  Goal: Absence of physical injury  Description: Absence of physical injury  Outcome: Ongoing     Problem: Airway Clearance - Ineffective  Goal: Achieve or maintain patent airway  Outcome: Ongoing     Problem: Gas Exchange - Impaired  Goal: Absence of hypoxia  Outcome: Ongoing  Goal: Promote optimal lung function  Outcome: Ongoing     Problem: Breathing Pattern - Ineffective  Goal: Ability to achieve and maintain a regular respiratory rate  Outcome: Ongoing     Problem:  Body Temperature -  Risk of, Imbalanced  Goal: Ability to maintain a body temperature within defined limits  Outcome: Ongoing  Goal: Will regain or maintain usual level of consciousness  Outcome: Ongoing  Goal: Complications related to the disease process, condition or treatment will be avoided or minimized  Outcome: Ongoing     Problem: Isolation Precautions - Risk of Spread of Infection  Goal: Prevent transmission of infection  Outcome: Ongoing     Problem: Nutrition Deficits  Goal: Optimize nutritional status  Outcome: Ongoing     Problem: Risk for Fluid Volume Deficit  Goal: Maintain normal heart rhythm  Outcome: Ongoing  Goal: Maintain absence of muscle cramping  Outcome: Ongoing  Goal: Maintain normal serum potassium, sodium, calcium, phosphorus, and pH  Outcome: Ongoing

## 2021-11-17 NOTE — PROGRESS NOTES
Pt is tolerating HFNC at 6L - sating 92%. Pt instructed on use of IS - pt VU and demonstrated for this RN. He has been up in chair for most of the day. Flat affect. New order for IV decardron administered and education provided to pt. He denies any productive coughing. Will continue to monitor and wean O2 as tolerated.        Electronically signed by Saúl Beckman RN on 11/17/2021 at 2:26 PM

## 2021-11-17 NOTE — FLOWSHEET NOTE
11/17/21 1756   Oxygen Therapy   SpO2 92 %   Pulse Oximeter Device Mode Continuous   O2 Flow Rate (L/min) 5 L/min     Pt tolerating 5L. VSS. Continues to use IS every hour as instructed. Good PO intake throughout the day. Denies any pain or needs at this time.    Electronically signed by Lucas Mcclure RN on 11/17/2021 at 5:57 PM

## 2021-11-17 NOTE — PROGRESS NOTES
Patient's oxygen decreased to the high 70's while sleeping. His oxygen was then increased to 6L. He was staying at mid to low 80's. He repositioned himself to be on his side. His oxygen stayed at mid 80's. I then increased his oxygen to 10L high flow to reach 91%.  Electronically signed by Vee Parra RN on 11/17/2021 at 1:39 AM

## 2021-11-17 NOTE — PLAN OF CARE
Problem: Falls - Risk of:  Goal: Will remain free from falls  Description: Will remain free from falls  11/17/2021 0042 by Camilo Tsang RN  Outcome: Ongoing     Problem: Falls - Risk of:  Goal: Absence of physical injury  Description: Absence of physical injury  11/17/2021 0042 by Camilo Tsang RN  Outcome: Ongoing     Problem: Airway Clearance - Ineffective  Goal: Achieve or maintain patent airway  11/17/2021 0042 by Camilo Tsang RN  Outcome: Ongoing     Problem: Gas Exchange - Impaired  Goal: Absence of hypoxia  11/17/2021 0042 by Camilo Tsang RN  Outcome: Ongoing     Problem: Gas Exchange - Impaired  Goal: Promote optimal lung function  11/17/2021 0042 by Camilo Tsang RN  Outcome: Ongoing     Problem: Breathing Pattern - Ineffective  Goal: Ability to achieve and maintain a regular respiratory rate  11/17/2021 0042 by Camilo Tsang RN  Outcome: Ongoing     Problem: Body Temperature -  Risk of, Imbalanced  Goal: Ability to maintain a body temperature within defined limits  11/17/2021 0042 by Camilo Tsang RN  Outcome: Ongoing     Problem: Body Temperature -  Risk of, Imbalanced  Goal: Will regain or maintain usual level of consciousness  11/17/2021 0042 by Camilo Tsang RN  Outcome: Ongoing     Problem:  Body Temperature -  Risk of, Imbalanced  Goal: Complications related to the disease process, condition or treatment will be avoided or minimized  11/17/2021 0042 by Camilo Tsang RN  Outcome: Ongoing     Problem: Isolation Precautions - Risk of Spread of Infection  Goal: Prevent transmission of infection  11/17/2021 0042 by Camilo Tsang RN  Outcome: Ongoing     Problem: Nutrition Deficits  Goal: Optimize nutritional status  11/17/2021 0042 by Camilo Tsang RN  Outcome: Ongoing     Problem: Risk for Fluid Volume Deficit  Goal: Maintain normal serum potassium, sodium, calcium, phosphorus, and pH  11/17/2021 0042 by Camilo Tsang RN  Outcome: Ongoing    Problem: Fatigue  Goal: Verbalize increase energy and improved vitality  11/17/2021 0042 by Vinita Israel RN  Outcome: Ongoing     Problem: Patient Education: Go to Patient Education Activity  Goal: Patient/Family Education  11/17/2021 0042 by Vinita Israel RN  Outcome: Ongoing

## 2021-11-18 LAB
ALBUMIN SERPL-MCNC: 3 G/DL (ref 3.4–5)
ALP BLD-CCNC: 55 U/L (ref 40–129)
ALT SERPL-CCNC: 112 U/L (ref 10–40)
ANION GAP SERPL CALCULATED.3IONS-SCNC: 10 MMOL/L (ref 3–16)
AST SERPL-CCNC: 46 U/L (ref 15–37)
BASOPHILS ABSOLUTE: 0 K/UL (ref 0–0.2)
BASOPHILS RELATIVE PERCENT: 0.1 %
BILIRUB SERPL-MCNC: 0.5 MG/DL (ref 0–1)
BILIRUBIN DIRECT: <0.2 MG/DL (ref 0–0.3)
BILIRUBIN, INDIRECT: ABNORMAL MG/DL (ref 0–1)
BUN BLDV-MCNC: 26 MG/DL (ref 7–20)
CALCIUM SERPL-MCNC: 8.3 MG/DL (ref 8.3–10.6)
CHLORIDE BLD-SCNC: 103 MMOL/L (ref 99–110)
CO2: 24 MMOL/L (ref 21–32)
CREAT SERPL-MCNC: 1.1 MG/DL (ref 0.8–1.3)
EOSINOPHILS ABSOLUTE: 0 K/UL (ref 0–0.6)
EOSINOPHILS RELATIVE PERCENT: 0 %
GFR AFRICAN AMERICAN: >60
GFR NON-AFRICAN AMERICAN: >60
GLUCOSE BLD-MCNC: 114 MG/DL (ref 70–99)
HCT VFR BLD CALC: 43.1 % (ref 40.5–52.5)
HEMOGLOBIN: 14.3 G/DL (ref 13.5–17.5)
LYMPHOCYTES ABSOLUTE: 0.7 K/UL (ref 1–5.1)
LYMPHOCYTES RELATIVE PERCENT: 5.5 %
MCH RBC QN AUTO: 27.5 PG (ref 26–34)
MCHC RBC AUTO-ENTMCNC: 33.2 G/DL (ref 31–36)
MCV RBC AUTO: 82.8 FL (ref 80–100)
MONOCYTES ABSOLUTE: 1.3 K/UL (ref 0–1.3)
MONOCYTES RELATIVE PERCENT: 10.4 %
NEUTROPHILS ABSOLUTE: 10.3 K/UL (ref 1.7–7.7)
NEUTROPHILS RELATIVE PERCENT: 84 %
PDW BLD-RTO: 14.5 % (ref 12.4–15.4)
PLATELET # BLD: 244 K/UL (ref 135–450)
PMV BLD AUTO: 9.4 FL (ref 5–10.5)
POTASSIUM REFLEX MAGNESIUM: 4.5 MMOL/L (ref 3.5–5.1)
RBC # BLD: 5.2 M/UL (ref 4.2–5.9)
SODIUM BLD-SCNC: 137 MMOL/L (ref 136–145)
TOTAL PROTEIN: 5.9 G/DL (ref 6.4–8.2)
WBC # BLD: 12.3 K/UL (ref 4–11)

## 2021-11-18 PROCEDURE — 6370000000 HC RX 637 (ALT 250 FOR IP): Performed by: INTERNAL MEDICINE

## 2021-11-18 PROCEDURE — 80048 BASIC METABOLIC PNL TOTAL CA: CPT

## 2021-11-18 PROCEDURE — 36415 COLL VENOUS BLD VENIPUNCTURE: CPT

## 2021-11-18 PROCEDURE — 85025 COMPLETE CBC W/AUTO DIFF WBC: CPT

## 2021-11-18 PROCEDURE — 2580000003 HC RX 258: Performed by: INTERNAL MEDICINE

## 2021-11-18 PROCEDURE — 2700000000 HC OXYGEN THERAPY PER DAY

## 2021-11-18 PROCEDURE — 94761 N-INVAS EAR/PLS OXIMETRY MLT: CPT

## 2021-11-18 PROCEDURE — 80076 HEPATIC FUNCTION PANEL: CPT

## 2021-11-18 PROCEDURE — 6360000002 HC RX W HCPCS: Performed by: INTERNAL MEDICINE

## 2021-11-18 PROCEDURE — 2060000000 HC ICU INTERMEDIATE R&B

## 2021-11-18 RX ADMIN — APIXABAN 5 MG: 5 TABLET, FILM COATED ORAL at 09:26

## 2021-11-18 RX ADMIN — BARICITINIB 4 MG: 2 TABLET, FILM COATED ORAL at 14:50

## 2021-11-18 RX ADMIN — DILTIAZEM HYDROCHLORIDE 120 MG: 120 CAPSULE, COATED, EXTENDED RELEASE ORAL at 09:26

## 2021-11-18 RX ADMIN — DEXAMETHASONE SODIUM PHOSPHATE 20 MG: 4 INJECTION, SOLUTION INTRA-ARTICULAR; INTRALESIONAL; INTRAMUSCULAR; INTRAVENOUS; SOFT TISSUE at 11:40

## 2021-11-18 RX ADMIN — SODIUM CHLORIDE, PRESERVATIVE FREE 10 ML: 5 INJECTION INTRAVENOUS at 09:26

## 2021-11-18 RX ADMIN — SODIUM CHLORIDE, PRESERVATIVE FREE 10 ML: 5 INJECTION INTRAVENOUS at 19:39

## 2021-11-18 RX ADMIN — APIXABAN 5 MG: 5 TABLET, FILM COATED ORAL at 19:39

## 2021-11-18 RX ADMIN — SODIUM CHLORIDE 25 ML: 9 INJECTION, SOLUTION INTRAVENOUS at 11:34

## 2021-11-18 ASSESSMENT — PAIN SCALES - GENERAL
PAINLEVEL_OUTOF10: 0

## 2021-11-18 NOTE — PROGRESS NOTES
Progress Note  Admit Date: 11/12/2021      PCP: Kenji Loving MD     CC: F/U for for for diarrhea    Days in hospital:  5    SUBJECTIVE / Interval History:  Patient feels okay. Now on 5 L   Still has some cough      Allergies  Amoxicillin and Pcn [penicillins]    Medications    Scheduled Meds:   dexamethasone  20 mg IntraVENous Q24H    dilTIAZem  120 mg Oral Daily    baricitinib  4 mg Oral Q24H    apixaban  5 mg Oral BID    sodium chloride flush  10 mL IntraVENous 2 times per day     Continuous Infusions:   sodium chloride Stopped (11/17/21 1243)       PRN Meds:  [Held by provider] sodium chloride flush, sodium chloride, ondansetron, polyethylene glycol, acetaminophen **OR** acetaminophen    Vitals    /87   Pulse 88   Temp 97.6 °F (36.4 °C) (Oral)   Resp 20   Ht 5' 11\" (1.803 m)   Wt 207 lb 0.2 oz (93.9 kg)   SpO2 95%   BMI 28.87 kg/m²     Exam:    Gen: No distress. Eyes: PERRL. No sclera icterus. No conjunctival injection. ENT: No discharge. Pharynx clear. External appearance of ears and nose normal.  Neck: Trachea midline. No obvious mass. Resp: No accessory muscle use. No crackles. No wheezes. No rhonchi. No dullness on percussion. CV: Regular rate. Regular rhythm. No murmur or rub. No edema. GI: Non-tender. Non-distended. No hernia. Skin: Warm, dry, normal texture and turgor. No nodule on exposed extremities. Lymph: No cervical LAD. No supraclavicular LAD. M/S: No cyanosis. No clubbing. No joint deformity. Neuro: Moves all four extremities. CN 2-12 tested, no defect noted. Psych: Oriented x 3. No anxiety. Awake. Alert. Intact judgement and insight.     Data    LABS  CBC:   Recent Labs     11/16/21 0621 11/17/21 0815 11/18/21  0628   WBC 8.4 12.0* 12.3*   HGB 15.6 15.4 14.3   HCT 49.2 45.9 43.1   MCV 87.6 82.2 82.8    256 244     BMP:   Recent Labs     11/16/21 0621 11/17/21  0815 11/18/21  0628    138 137   K 4.6 4.6 4.5    105 103   CO2 21 20* 24 BUN 27* 31* 26*   CREATININE 1.1 1.2 1.1   GLUCOSE 120* 104* 114*     POC GLUCOSE:  No results for input(s): POCGLU in the last 72 hours. LIVER PROFILE:   Recent Labs     11/16/21  0621 11/17/21  0815 11/18/21  0628   * 97* 46*   * 135* 112*   LABALBU 3.1* 3.2* 3.0*   BILIDIR <0.2 <0.2 <0.2   BILITOT 0.6 0.6 0.5   ALKPHOS 56 62 55     PT/INR: No results for input(s): PROTIME, INR in the last 72 hours. APTT: No results for input(s): APTT in the last 72 hours. UA:No results for input(s): NITRITE, COLORU, PHUR, LABCAST, WBCUA, RBCUA, MUCUS, TRICHOMONAS, YEAST, BACTERIA, CLARITYU, SPECGRAV, LEUKOCYTESUR, UROBILINOGEN, BILIRUBINUR, BLOODU, GLUCOSEU, KETUA, AMORPHOUS in the last 72 hours. Microbiology:  Wound Culture: No results for input(s): WNDABS, ORG in the last 72 hours. Invalid input(s):  LABGRAM  Nasal Culture: No results for input(s): ORG, MRSAPCR in the last 72 hours. Blood Culture: No results for input(s): BC, BLOODCULT2 in the last 72 hours. Fungal Culture:   No results for input(s): FUNGSM in the last 72 hours. No results for input(s): FUNCXBLD in the last 72 hours. CSF Culture:  No results for input(s): COLORCSF, APPEARCSF, CFTUBE, CLOTCSF, WBCCSF, RBCCSF, NEUTCSF, NUMCELLSCSF, LYMPHSCSF, MONOCSF, GLUCCSF, VOLCSF in the last 72 hours. Respiratory Culture:  No results for input(s): Josefina Gloss in the last 72 hours. AFB:No results for input(s): AFBSMEAR in the last 72 hours. Urine Culture  No results for input(s): LABURIN in the last 72 hours. RADIOLOGY:    XR CHEST PORTABLE   Final Result   No significant change in appearance of of bilateral multifocal pneumonia over   the past 5 days. XR CHEST PORTABLE   Final Result   Multifocal pneumonia in keeping with history of COVID-19.              CONSULTS:    IP CONSULT TO CARDIOLOGY  IP CONSULT TO PHARMACY    ASSESSMENT AND PLAN:      Principal Problem:    Pneumonia due to COVID-19 virus  Active Problems:    Atrial fibrillation with rapid ventricular response ([facility])    Essential hypertension    Acute respiratory failure with hypoxia ([facility])  Resolved Problems:    * No resolved hospital problems. *    Patient is a 45-year-old male who presented with chest pain shortness of breath and fever for the last 7 to 10 days. In the ER patient was found to have Covid pneumonia with A. fib with RVR. Patient continued to be hypoxic. Patient was on a Cardizem drip and was switched to oral Cardizem with good control of his A. fib. He is anticoagulated with Eliquis. For patient's COVID-19 pneumonia is treated with steroids along with baricitinib. A. fib with RVR-now controlled  -Heart rate now controlled. Continue p.o. Cardizem  -Wean off amiodarone drip  -Joel vas score 1  -On therapeutic Lovenox> eliquis    Sepsis present on admission-improving   -With fever and tachycardia and tachypnea  -Secondary to Covid     Acute hypoxic respiratory failure-better today, now on 5 L   -Wean as tolerated        COVID-19 pneumonia  -Patient was initially on low-dose steroids and finished 4 days. In the hospital had worsening hypoxia and went went up to 10 L and switch to high-dose steroid day 2  -If patient's oxygen trends up will start baricitinib( D6)  -Inflammatory markers shows improvement   -CXR unchanged         Discussed with patient about goals of care and patient is a full code      DVT Prophylaxis: eliquis  Diet: ADULT DIET; Regular  Code Status: Full Code    PT/OT Eval Status:    Discharge plan - ct care    The patient and / or the family were informed of the results of any tests, a time was given to answer questions, a plan was proposed and they agreed with plan. Discussed with consulting physicians, nursing and social work     The note was completed using EMR. Every effort was made to ensure accuracy; however, inadvertent computerized transcription errors may be present.        Caity Martinez MD

## 2021-11-18 NOTE — PROGRESS NOTES
Patient remains on 5L tonight. He is satting  above 95%. He prones and uses IS as tolerated. He conitnues to have a loss of appetite, but states \"it is getting better. \" VS WDL.  Electronically signed by Gwen Espinoza RN on 11/18/2021 at 2:17 AM

## 2021-11-18 NOTE — PLAN OF CARE
Problem: Falls - Risk of:  Goal: Will remain free from falls  Description: Will remain free from falls  Outcome: Ongoing  Goal: Absence of physical injury  Description: Absence of physical injury  Outcome: Ongoing  Bed alarm on, bed in lowest position, wheels locked. Fall risk assessment completed every shift. Nonskid socks on, fall sign posted. Pt educated on use of call light. Problem: Airway Clearance - Ineffective  Goal: Achieve or maintain patent airway  Outcome: Ongoing     Problem: Gas Exchange - Impaired  Goal: Absence of hypoxia  Outcome: Ongoing  Goal: Promote optimal lung function  Outcome: Ongoing     Problem: Breathing Pattern - Ineffective  Goal: Ability to achieve and maintain a regular respiratory rate  Outcome: Ongoing  respiratory assessment completed q shift and PRN. RR and Sp02 assessed q4 hours. collaborating with RT to maintain adequate oxygenation and sp02. Encouraging incentive spirometry. Problem: Body Temperature -  Risk of, Imbalanced  Goal: Ability to maintain a body temperature within defined limits  Outcome: Ongoing  Goal: Will regain or maintain usual level of consciousness  Outcome: Ongoing  Goal: Complications related to the disease process, condition or treatment will be avoided or minimized  Outcome: Ongoing  Pt remains afebrile   Problem: Isolation Precautions - Risk of Spread of Infection  Goal: Prevent transmission of infection  Outcome: Ongoing     Problem: Nutrition Deficits  Goal: Optimize nutritional status  Outcome: Ongoing  Encouraging pt to eat at least 50% of all meals. Assisting with feeding when needed. collaborating with dietician for optimal nutrition. Problem: Risk for Fluid Volume Deficit  Goal: Maintain normal heart rhythm  Outcome: Ongoing  Goal: Maintain absence of muscle cramping  Outcome: Ongoing  Goal: Maintain normal serum potassium, sodium, calcium, phosphorus, and pH  Outcome: Ongoing  Encouraged pt to drink prescribed amount of fluid per day. Monitoring and assessing lab values every shift, Notifying physician if abnormal. Assessing skin turgor and mucous membranes.      Problem: Loneliness or Risk for Loneliness  Goal: Demonstrate positive use of time alone when socialization is not possible  Outcome: Ongoing     Problem: Fatigue  Goal: Verbalize increase energy and improved vitality  Outcome: Ongoing     Problem: Patient Education: Go to Patient Education Activity  Goal: Patient/Family Education  Outcome: Ongoing

## 2021-11-19 LAB
ALBUMIN SERPL-MCNC: 2.9 G/DL (ref 3.4–5)
ALP BLD-CCNC: 59 U/L (ref 40–129)
ALT SERPL-CCNC: 97 U/L (ref 10–40)
ANION GAP SERPL CALCULATED.3IONS-SCNC: 12 MMOL/L (ref 3–16)
AST SERPL-CCNC: 29 U/L (ref 15–37)
BASOPHILS ABSOLUTE: 0 K/UL (ref 0–0.2)
BASOPHILS RELATIVE PERCENT: 0.2 %
BILIRUB SERPL-MCNC: 0.4 MG/DL (ref 0–1)
BILIRUBIN DIRECT: <0.2 MG/DL (ref 0–0.3)
BILIRUBIN, INDIRECT: ABNORMAL MG/DL (ref 0–1)
BUN BLDV-MCNC: 28 MG/DL (ref 7–20)
CALCIUM SERPL-MCNC: 8.2 MG/DL (ref 8.3–10.6)
CHLORIDE BLD-SCNC: 106 MMOL/L (ref 99–110)
CO2: 19 MMOL/L (ref 21–32)
CREAT SERPL-MCNC: 1 MG/DL (ref 0.8–1.3)
EOSINOPHILS ABSOLUTE: 0 K/UL (ref 0–0.6)
EOSINOPHILS RELATIVE PERCENT: 0 %
GFR AFRICAN AMERICAN: >60
GFR NON-AFRICAN AMERICAN: >60
GLUCOSE BLD-MCNC: 111 MG/DL (ref 70–99)
HCT VFR BLD CALC: 44.3 % (ref 40.5–52.5)
HEMOGLOBIN: 14.7 G/DL (ref 13.5–17.5)
LYMPHOCYTES ABSOLUTE: 0.6 K/UL (ref 1–5.1)
LYMPHOCYTES RELATIVE PERCENT: 2.9 %
MCH RBC QN AUTO: 27 PG (ref 26–34)
MCHC RBC AUTO-ENTMCNC: 33.1 G/DL (ref 31–36)
MCV RBC AUTO: 81.7 FL (ref 80–100)
MONOCYTES ABSOLUTE: 1.7 K/UL (ref 0–1.3)
MONOCYTES RELATIVE PERCENT: 9 %
NEUTROPHILS ABSOLUTE: 17 K/UL (ref 1.7–7.7)
NEUTROPHILS RELATIVE PERCENT: 87.9 %
PDW BLD-RTO: 14.4 % (ref 12.4–15.4)
PLATELET # BLD: 288 K/UL (ref 135–450)
PMV BLD AUTO: 9.9 FL (ref 5–10.5)
POTASSIUM REFLEX MAGNESIUM: 4.6 MMOL/L (ref 3.5–5.1)
RBC # BLD: 5.43 M/UL (ref 4.2–5.9)
REASON FOR REJECTION: NORMAL
REJECTED TEST: NORMAL
SODIUM BLD-SCNC: 137 MMOL/L (ref 136–145)
TOTAL PROTEIN: 5.6 G/DL (ref 6.4–8.2)
WBC # BLD: 19.4 K/UL (ref 4–11)

## 2021-11-19 PROCEDURE — 6360000002 HC RX W HCPCS: Performed by: INTERNAL MEDICINE

## 2021-11-19 PROCEDURE — 6370000000 HC RX 637 (ALT 250 FOR IP): Performed by: INTERNAL MEDICINE

## 2021-11-19 PROCEDURE — 94761 N-INVAS EAR/PLS OXIMETRY MLT: CPT

## 2021-11-19 PROCEDURE — 36415 COLL VENOUS BLD VENIPUNCTURE: CPT

## 2021-11-19 PROCEDURE — 80048 BASIC METABOLIC PNL TOTAL CA: CPT

## 2021-11-19 PROCEDURE — 2580000003 HC RX 258: Performed by: INTERNAL MEDICINE

## 2021-11-19 PROCEDURE — 85025 COMPLETE CBC W/AUTO DIFF WBC: CPT

## 2021-11-19 PROCEDURE — 2700000000 HC OXYGEN THERAPY PER DAY

## 2021-11-19 PROCEDURE — 80076 HEPATIC FUNCTION PANEL: CPT

## 2021-11-19 PROCEDURE — 2060000000 HC ICU INTERMEDIATE R&B

## 2021-11-19 RX ADMIN — APIXABAN 5 MG: 5 TABLET, FILM COATED ORAL at 08:22

## 2021-11-19 RX ADMIN — DEXAMETHASONE SODIUM PHOSPHATE 20 MG: 4 INJECTION, SOLUTION INTRA-ARTICULAR; INTRALESIONAL; INTRAMUSCULAR; INTRAVENOUS; SOFT TISSUE at 13:14

## 2021-11-19 RX ADMIN — APIXABAN 5 MG: 5 TABLET, FILM COATED ORAL at 21:44

## 2021-11-19 RX ADMIN — BARICITINIB 4 MG: 2 TABLET, FILM COATED ORAL at 13:14

## 2021-11-19 RX ADMIN — SODIUM CHLORIDE, PRESERVATIVE FREE 10 ML: 5 INJECTION INTRAVENOUS at 08:23

## 2021-11-19 RX ADMIN — DILTIAZEM HYDROCHLORIDE 120 MG: 120 CAPSULE, COATED, EXTENDED RELEASE ORAL at 08:22

## 2021-11-19 RX ADMIN — SODIUM CHLORIDE, PRESERVATIVE FREE 10 ML: 5 INJECTION INTRAVENOUS at 21:44

## 2021-11-19 ASSESSMENT — PAIN SCALES - GENERAL
PAINLEVEL_OUTOF10: 0

## 2021-11-19 NOTE — PROGRESS NOTES
No adverse events. O2 at 5L at 97% and decreased to 4L. Patient on and off prone throughout the night. Up independent with no issues.

## 2021-11-19 NOTE — PROGRESS NOTES
Patient began to desat to mid [de-identified]. O2 increased to 5L and satting at 92%.  Electronically signed by Rachel Garcia RN on 11/18/2021 at 11:54 PM

## 2021-11-19 NOTE — CARE COORDINATION
Spoke to patient over the phone. Patient plans on returning home at discharge. Patient doesn't have insurance, over income for Medicaid per Financial notes. If oxygen needed, discussed $150 per month cost for home oxygen, patient unsure if he'd be able to pay for this. Hopefully oxygen can be weaned off, if not may need natividad oxygen.   Electronically signed by Kiki Pineda RN Case Management [phone] on 11/19/2021 at 1:55 PM

## 2021-11-19 NOTE — PROGRESS NOTES
Nutrition Assessment     Type and Reason for Visit: Initial    Nutrition Recommendations/Plan:    - Continue regular diet    RD did not conduct direct, in-person nutrition evaluation in efforts to reduce exposure and use of PPE for high risk persons, PUI persons, patients who have tested positive for Covid-19 or those awaiting respiratory panel results. EMR was screened for nutrition risk factors, as defined per nutrition standards of care. Nutrition Assessment:  LOS assessment. Pt in droplet plus isolation for COVID. No wt loss per EMR. On regular diet with intakes %. No nutrition concerns at this time. Malnutrition Assessment:  Malnutrition Status: No malnutrition    Nutrition Related Findings: +BM 11/18. No edema. Current Nutrition Therapies:    ADULT DIET; Regular    Anthropometric Measures:  · Height: 5' 11\" (180.3 cm)  · Current Body Wt: 208 lb (94.3 kg)   · BMI: 29    Nutrition Diagnosis:   No nutrition diagnosis at this time    Nutrition Interventions:   Food and/or Nutrient Delivery:  Continue Current Diet  Nutrition Education/Counseling:  Education not indicated   Coordination of Nutrition Care:  Continue to monitor while inpatient    Goals:  PO intake greater than 50%       Nutrition Monitoring and Evaluation:   Behavioral-Environmental Outcomes:  None Identified   Food/Nutrient Intake Outcomes:  Food and Nutrient Intake  Physical Signs/Symptoms Outcomes:  Weight     Discharge Planning:     Too soon to determine     Electronically signed by Aracely Mars RD, LD on 11/19/21 at 10:16 AM EST    Contact: [phone]

## 2021-11-19 NOTE — PROGRESS NOTES
Progress Note  Admit Date: 11/12/2021      PCP: Magali Atkins MD     CC: F/U for for for diarrhea    Days in hospital:  6    SUBJECTIVE / Interval History:  Patient feels okay. Now on 4 L   Still has some cough   O 2was down to 1 L and went up to 4 overnight       Allergies  Amoxicillin and Pcn [penicillins]    Medications    Scheduled Meds:   dexamethasone  20 mg IntraVENous Q24H    dilTIAZem  120 mg Oral Daily    baricitinib  4 mg Oral Q24H    apixaban  5 mg Oral BID    sodium chloride flush  10 mL IntraVENous 2 times per day     Continuous Infusions:   sodium chloride 25 mL (11/18/21 1134)       PRN Meds:  [Held by provider] sodium chloride flush, sodium chloride, ondansetron, polyethylene glycol, acetaminophen **OR** acetaminophen    Vitals    /62   Pulse 88   Temp 98.2 °F (36.8 °C) (Oral)   Resp 16   Ht 5' 11\" (1.803 m)   Wt 208 lb 8.9 oz (94.6 kg)   SpO2 97%   BMI 29.09 kg/m²     Exam:    Gen: No distress. Eyes: PERRL. No sclera icterus. No conjunctival injection. ENT: No discharge. Pharynx clear. External appearance of ears and nose normal.  Neck: Trachea midline. No obvious mass. Resp: No accessory muscle use. No crackles. No wheezes. No rhonchi. No dullness on percussion. CV: Regular rate. Regular rhythm. No murmur or rub. No edema. GI: Non-tender. Non-distended. No hernia. Skin: Warm, dry, normal texture and turgor. No nodule on exposed extremities. Lymph: No cervical LAD. No supraclavicular LAD. M/S: No cyanosis. No clubbing. No joint deformity. Neuro: Moves all four extremities. CN 2-12 tested, no defect noted. Psych: Oriented x 3. No anxiety. Awake. Alert. Intact judgement and insight.     Data    LABS  CBC:   Recent Labs     11/17/21  0815 11/18/21  0628 11/19/21  0754   WBC 12.0* 12.3* 19.4*   HGB 15.4 14.3 14.7   HCT 45.9 43.1 44.3   MCV 82.2 82.8 81.7    244 288     BMP:   Recent Labs     11/17/21  0815 11/18/21  0628 11/19/21  0619    137 137   K 4.6 4.5 4.6    103 106   CO2 20* 24 19*   BUN 31* 26* 28*   CREATININE 1.2 1.1 1.0   GLUCOSE 104* 114* 111*     POC GLUCOSE:  No results for input(s): POCGLU in the last 72 hours. LIVER PROFILE:   Recent Labs     11/17/21  0815 11/18/21  0628 11/19/21  0619   AST 97* 46* 29   * 112* 97*   LABALBU 3.2* 3.0* 2.9*   BILIDIR <0.2 <0.2 <0.2   BILITOT 0.6 0.5 0.4   ALKPHOS 62 55 59     PT/INR: No results for input(s): PROTIME, INR in the last 72 hours. APTT: No results for input(s): APTT in the last 72 hours. UA:No results for input(s): NITRITE, COLORU, PHUR, LABCAST, WBCUA, RBCUA, MUCUS, TRICHOMONAS, YEAST, BACTERIA, CLARITYU, SPECGRAV, LEUKOCYTESUR, UROBILINOGEN, BILIRUBINUR, BLOODU, GLUCOSEU, KETUA, AMORPHOUS in the last 72 hours. Microbiology:  Wound Culture: No results for input(s): WNDABS, ORG in the last 72 hours. Invalid input(s):  LABGRAM  Nasal Culture: No results for input(s): ORG, MRSAPCR in the last 72 hours. Blood Culture: No results for input(s): BC, BLOODCULT2 in the last 72 hours. Fungal Culture:   No results for input(s): FUNGSM in the last 72 hours. No results for input(s): FUNCXBLD in the last 72 hours. CSF Culture:  No results for input(s): COLORCSF, APPEARCSF, CFTUBE, CLOTCSF, WBCCSF, RBCCSF, NEUTCSF, NUMCELLSCSF, LYMPHSCSF, MONOCSF, GLUCCSF, VOLCSF in the last 72 hours. Respiratory Culture:  No results for input(s): Katheleen Gentle in the last 72 hours. AFB:No results for input(s): AFBSMEAR in the last 72 hours. Urine Culture  No results for input(s): LABURIN in the last 72 hours. RADIOLOGY:    XR CHEST PORTABLE   Final Result   No significant change in appearance of of bilateral multifocal pneumonia over   the past 5 days. XR CHEST PORTABLE   Final Result   Multifocal pneumonia in keeping with history of COVID-19.              CONSULTS:    IP CONSULT TO CARDIOLOGY  IP CONSULT TO PHARMACY    ASSESSMENT AND PLAN:      Principal Problem:    Pneumonia due to COVID-19 virus  Active Problems:    Atrial fibrillation with rapid ventricular response (HCC)    Essential hypertension    Acute respiratory failure with hypoxia (HCC)  Resolved Problems:    * No resolved hospital problems. *    Patient is a 26-year-old male who presented with chest pain shortness of breath and fever for the last 7 to 10 days. In the ER patient was found to have Covid pneumonia with A. fib with RVR. Patient continued to be hypoxic. Patient was on a Cardizem drip and was switched to oral Cardizem with good control of his A. fib. He is anticoagulated with Eliquis. For patient's COVID-19 pneumonia is treated with steroids along with baricitinib. A. fib with RVR-now controlled  -Heart rate now controlled. Continue p.o. Cardizem  -Wean off amiodarone drip  -Joel vas score 1  -On therapeutic Lovenox> eliquis    Sepsis present on admission-improving   -With fever and tachycardia and tachypnea  -Secondary to Covid     Acute hypoxic respiratory failure-better today, now on 4 L ( desats overnight)   -Wean as tolerated        COVID-19 pneumonia  -Patient was initially on low-dose steroids and finished 4 days. In the hospital had worsening hypoxia and went went up to 10 L and switch to high-dose steroid day 3  -If patient's oxygen trends up will start baricitinib( D7)  -Inflammatory markers shows improvement   -CXR unchanged         Discussed with patient about goals of care and patient is a full code      DVT Prophylaxis: eliquis  Diet: ADULT DIET; Regular  Code Status: Full Code    PT/OT Eval Status:    Discharge plan - 1-2 days    The patient and / or the family were informed of the results of any tests, a time was given to answer questions, a plan was proposed and they agreed with plan. Discussed with consulting physicians, nursing and social work     The note was completed using EMR. Every effort was made to ensure accuracy; however, inadvertent computerized transcription errors may be present. Donn Fernandez MD

## 2021-11-20 LAB
ALBUMIN SERPL-MCNC: 2.9 G/DL (ref 3.4–5)
ALP BLD-CCNC: 56 U/L (ref 40–129)
ALT SERPL-CCNC: 79 U/L (ref 10–40)
ANION GAP SERPL CALCULATED.3IONS-SCNC: 10 MMOL/L (ref 3–16)
AST SERPL-CCNC: 22 U/L (ref 15–37)
BASOPHILS ABSOLUTE: 0 K/UL (ref 0–0.2)
BASOPHILS RELATIVE PERCENT: 0 %
BILIRUB SERPL-MCNC: 0.6 MG/DL (ref 0–1)
BILIRUBIN DIRECT: <0.2 MG/DL (ref 0–0.3)
BILIRUBIN, INDIRECT: ABNORMAL MG/DL (ref 0–1)
BUN BLDV-MCNC: 27 MG/DL (ref 7–20)
CALCIUM SERPL-MCNC: 8.2 MG/DL (ref 8.3–10.6)
CHLORIDE BLD-SCNC: 107 MMOL/L (ref 99–110)
CO2: 24 MMOL/L (ref 21–32)
CREAT SERPL-MCNC: 1.1 MG/DL (ref 0.8–1.3)
EOSINOPHILS ABSOLUTE: 0 K/UL (ref 0–0.6)
EOSINOPHILS RELATIVE PERCENT: 0 %
GFR AFRICAN AMERICAN: >60
GFR NON-AFRICAN AMERICAN: >60
GLUCOSE BLD-MCNC: 106 MG/DL (ref 70–99)
HCT VFR BLD CALC: 44.2 % (ref 40.5–52.5)
HEMOGLOBIN: 14.4 G/DL (ref 13.5–17.5)
LYMPHOCYTES ABSOLUTE: 0.4 K/UL (ref 1–5.1)
LYMPHOCYTES RELATIVE PERCENT: 2.6 %
MCH RBC QN AUTO: 26.9 PG (ref 26–34)
MCHC RBC AUTO-ENTMCNC: 32.6 G/DL (ref 31–36)
MCV RBC AUTO: 82.5 FL (ref 80–100)
MONOCYTES ABSOLUTE: 1.1 K/UL (ref 0–1.3)
MONOCYTES RELATIVE PERCENT: 6.5 %
NEUTROPHILS ABSOLUTE: 15.8 K/UL (ref 1.7–7.7)
NEUTROPHILS RELATIVE PERCENT: 90.9 %
PDW BLD-RTO: 14.7 % (ref 12.4–15.4)
PLATELET # BLD: 339 K/UL (ref 135–450)
PMV BLD AUTO: 9.4 FL (ref 5–10.5)
POTASSIUM REFLEX MAGNESIUM: 4.9 MMOL/L (ref 3.5–5.1)
RBC # BLD: 5.35 M/UL (ref 4.2–5.9)
SODIUM BLD-SCNC: 141 MMOL/L (ref 136–145)
TOTAL PROTEIN: 5.8 G/DL (ref 6.4–8.2)
WBC # BLD: 17.4 K/UL (ref 4–11)

## 2021-11-20 PROCEDURE — 2700000000 HC OXYGEN THERAPY PER DAY

## 2021-11-20 PROCEDURE — 6370000000 HC RX 637 (ALT 250 FOR IP): Performed by: INTERNAL MEDICINE

## 2021-11-20 PROCEDURE — 6360000002 HC RX W HCPCS: Performed by: INTERNAL MEDICINE

## 2021-11-20 PROCEDURE — 94761 N-INVAS EAR/PLS OXIMETRY MLT: CPT

## 2021-11-20 PROCEDURE — 2060000000 HC ICU INTERMEDIATE R&B

## 2021-11-20 PROCEDURE — 36415 COLL VENOUS BLD VENIPUNCTURE: CPT

## 2021-11-20 PROCEDURE — 2580000003 HC RX 258: Performed by: INTERNAL MEDICINE

## 2021-11-20 PROCEDURE — 85025 COMPLETE CBC W/AUTO DIFF WBC: CPT

## 2021-11-20 PROCEDURE — 80076 HEPATIC FUNCTION PANEL: CPT

## 2021-11-20 PROCEDURE — 80048 BASIC METABOLIC PNL TOTAL CA: CPT

## 2021-11-20 RX ADMIN — BENZOCAINE AND MENTHOL 1 LOZENGE: 15; 3.6 LOZENGE ORAL at 21:00

## 2021-11-20 RX ADMIN — DEXAMETHASONE SODIUM PHOSPHATE 20 MG: 4 INJECTION, SOLUTION INTRA-ARTICULAR; INTRALESIONAL; INTRAMUSCULAR; INTRAVENOUS; SOFT TISSUE at 13:41

## 2021-11-20 RX ADMIN — APIXABAN 5 MG: 5 TABLET, FILM COATED ORAL at 21:00

## 2021-11-20 RX ADMIN — BARICITINIB 4 MG: 2 TABLET, FILM COATED ORAL at 13:40

## 2021-11-20 RX ADMIN — APIXABAN 5 MG: 5 TABLET, FILM COATED ORAL at 08:54

## 2021-11-20 RX ADMIN — DILTIAZEM HYDROCHLORIDE 120 MG: 120 CAPSULE, COATED, EXTENDED RELEASE ORAL at 08:54

## 2021-11-20 ASSESSMENT — PAIN SCALES - GENERAL: PAINLEVEL_OUTOF10: 0

## 2021-11-20 NOTE — PROGRESS NOTES
K 4.5 4.6 4.9    106 107   CO2 24 19* 24   BUN 26* 28* 27*   CREATININE 1.1 1.0 1.1   GLUCOSE 114* 111* 106*     POC GLUCOSE:  No results for input(s): POCGLU in the last 72 hours. LIVER PROFILE:   Recent Labs     11/18/21  0628 11/19/21  0619 11/20/21  0802   AST 46* 29 22   * 97* 79*   LABALBU 3.0* 2.9* 2.9*   BILIDIR <0.2 <0.2 <0.2   BILITOT 0.5 0.4 0.6   ALKPHOS 55 59 56     PT/INR: No results for input(s): PROTIME, INR in the last 72 hours. APTT: No results for input(s): APTT in the last 72 hours. UA:No results for input(s): NITRITE, COLORU, PHUR, LABCAST, WBCUA, RBCUA, MUCUS, TRICHOMONAS, YEAST, BACTERIA, CLARITYU, SPECGRAV, LEUKOCYTESUR, UROBILINOGEN, BILIRUBINUR, BLOODU, GLUCOSEU, KETUA, AMORPHOUS in the last 72 hours. Microbiology:  Wound Culture: No results for input(s): WNDABS, ORG in the last 72 hours. Invalid input(s):  LABGRAM  Nasal Culture: No results for input(s): ORG, MRSAPCR in the last 72 hours. Blood Culture: No results for input(s): BC, BLOODCULT2 in the last 72 hours. Fungal Culture:   No results for input(s): FUNGSM in the last 72 hours. No results for input(s): FUNCXBLD in the last 72 hours. CSF Culture:  No results for input(s): COLORCSF, APPEARCSF, CFTUBE, CLOTCSF, WBCCSF, RBCCSF, NEUTCSF, NUMCELLSCSF, LYMPHSCSF, MONOCSF, GLUCCSF, VOLCSF in the last 72 hours. Respiratory Culture:  No results for input(s): Earlis Card in the last 72 hours. AFB:No results for input(s): AFBSMEAR in the last 72 hours. Urine Culture  No results for input(s): LABURIN in the last 72 hours. RADIOLOGY:    XR CHEST PORTABLE   Final Result   No significant change in appearance of of bilateral multifocal pneumonia over   the past 5 days. XR CHEST PORTABLE   Final Result   Multifocal pneumonia in keeping with history of COVID-19.              CONSULTS:    IP CONSULT TO CARDIOLOGY  IP CONSULT TO PHARMACY    ASSESSMENT AND PLAN:      Principal Problem:    Pneumonia due to COVID-19 virus  Active Problems:    Atrial fibrillation with rapid ventricular response (HCC)    Essential hypertension    Acute respiratory failure with hypoxia (HCC)  Resolved Problems:    * No resolved hospital problems. *    Patient is a 61-year-old male who presented with chest pain shortness of breath and fever for the last 7 to 10 days. In the ER patient was found to have Covid pneumonia with A. fib with RVR. Patient continued to be hypoxic. Patient was on a Cardizem drip and was switched to oral Cardizem with good control of his A. fib. He is anticoagulated with Eliquis. For patient's COVID-19 pneumonia is treated with steroids along with baricitinib. A. fib with RVR-now controlled  -Heart rate now controlled. Continue p.o. Cardizem  -Wean off amiodarone drip  -Joel vas score 1  -On therapeutic Lovenox> eliquis    Sepsis present on admission-improving   -With fever and tachycardia and tachypnea  -Secondary to Covid     Acute hypoxic respiratory failure-better today, now on 4 L ( desats overnight)   -Wean as tolerated        COVID-19 pneumonia  -Patient was initially on low-dose steroids and finished 4 days. In the hospital had worsening hypoxia and went went up to 10 L and switch to high-dose steroid day 3  -If patient's oxygen trends up will start baricitinib( D7)  -Inflammatory markers shows improvement   -CXR unchanged         Discussed with patient about goals of care and patient is a full code      DVT Prophylaxis: eliquis  Diet: ADULT DIET; Regular  Code Status: Full Code    PT/OT Eval Status:    Discharge plan - 1-2 days    The patient and / or the family were informed of the results of any tests, a time was given to answer questions, a plan was proposed and they agreed with plan. Discussed with consulting physicians, nursing and social work     The note was completed using EMR. Every effort was made to ensure accuracy; however, inadvertent computerized transcription errors may be present. Mario Alberto Forrester MD

## 2021-11-20 NOTE — PLAN OF CARE
Problem: Falls - Risk of:  Goal: Will remain free from falls  Description: Will remain free from falls  Outcome: Ongoing  Note: Low fall risk. Bed set to lowest position, call light w/n reach. Pt uses call light appropriately. Problem: Gas Exchange - Impaired  Goal: Absence of hypoxia  Outcome: Ongoing  Note: Pt on 2L of O2. Dyspnea w/ exertion but rebounds quickly. Problem: Body Temperature -  Risk of, Imbalanced  Goal: Ability to maintain a body temperature within defined limits  Outcome: Ongoing  Note: Pt afebrile this shift. Problem: Isolation Precautions - Risk of Spread of Infection  Goal: Prevent transmission of infection  Outcome: Ongoing  Note: Pt remains in contact/droplet precautions due to COVID+ status. Proper PPE utilized. Problem: Fatigue  Goal: Verbalize increase energy and improved vitality  Outcome: Ongoing  Note: Pt reported having moderate fatigue while attempting to shower. Able to complete shower, change gown and make it back to bed w/ little to no assistance. Pt uses call light appropriately when assistance is needed.

## 2021-11-21 LAB
ALBUMIN SERPL-MCNC: 2.8 G/DL (ref 3.4–5)
ALP BLD-CCNC: 57 U/L (ref 40–129)
ALT SERPL-CCNC: 68 U/L (ref 10–40)
ANION GAP SERPL CALCULATED.3IONS-SCNC: 10 MMOL/L (ref 3–16)
AST SERPL-CCNC: 17 U/L (ref 15–37)
BASOPHILS ABSOLUTE: 0 K/UL (ref 0–0.2)
BASOPHILS RELATIVE PERCENT: 0.1 %
BILIRUB SERPL-MCNC: 0.5 MG/DL (ref 0–1)
BILIRUBIN DIRECT: <0.2 MG/DL (ref 0–0.3)
BILIRUBIN, INDIRECT: ABNORMAL MG/DL (ref 0–1)
BUN BLDV-MCNC: 27 MG/DL (ref 7–20)
CALCIUM SERPL-MCNC: 8 MG/DL (ref 8.3–10.6)
CHLORIDE BLD-SCNC: 107 MMOL/L (ref 99–110)
CO2: 22 MMOL/L (ref 21–32)
CREAT SERPL-MCNC: 1.1 MG/DL (ref 0.8–1.3)
EOSINOPHILS ABSOLUTE: 0 K/UL (ref 0–0.6)
EOSINOPHILS RELATIVE PERCENT: 0 %
GFR AFRICAN AMERICAN: >60
GFR NON-AFRICAN AMERICAN: >60
GLUCOSE BLD-MCNC: 109 MG/DL (ref 70–99)
HCT VFR BLD CALC: 44.1 % (ref 40.5–52.5)
HEMOGLOBIN: 14.5 G/DL (ref 13.5–17.5)
LYMPHOCYTES ABSOLUTE: 0.4 K/UL (ref 1–5.1)
LYMPHOCYTES RELATIVE PERCENT: 2.2 %
MCH RBC QN AUTO: 27.2 PG (ref 26–34)
MCHC RBC AUTO-ENTMCNC: 33 G/DL (ref 31–36)
MCV RBC AUTO: 82.5 FL (ref 80–100)
MONOCYTES ABSOLUTE: 1.2 K/UL (ref 0–1.3)
MONOCYTES RELATIVE PERCENT: 6.6 %
NEUTROPHILS ABSOLUTE: 16.8 K/UL (ref 1.7–7.7)
NEUTROPHILS RELATIVE PERCENT: 91.1 %
PDW BLD-RTO: 15 % (ref 12.4–15.4)
PLATELET # BLD: 377 K/UL (ref 135–450)
PMV BLD AUTO: 9.6 FL (ref 5–10.5)
POTASSIUM REFLEX MAGNESIUM: 4.5 MMOL/L (ref 3.5–5.1)
RBC # BLD: 5.35 M/UL (ref 4.2–5.9)
SODIUM BLD-SCNC: 139 MMOL/L (ref 136–145)
TOTAL PROTEIN: 5.6 G/DL (ref 6.4–8.2)
WBC # BLD: 18.4 K/UL (ref 4–11)

## 2021-11-21 PROCEDURE — 2060000000 HC ICU INTERMEDIATE R&B

## 2021-11-21 PROCEDURE — 94761 N-INVAS EAR/PLS OXIMETRY MLT: CPT

## 2021-11-21 PROCEDURE — 2580000003 HC RX 258: Performed by: INTERNAL MEDICINE

## 2021-11-21 PROCEDURE — 80076 HEPATIC FUNCTION PANEL: CPT

## 2021-11-21 PROCEDURE — 85025 COMPLETE CBC W/AUTO DIFF WBC: CPT

## 2021-11-21 PROCEDURE — 6360000002 HC RX W HCPCS: Performed by: INTERNAL MEDICINE

## 2021-11-21 PROCEDURE — 6370000000 HC RX 637 (ALT 250 FOR IP): Performed by: INTERNAL MEDICINE

## 2021-11-21 PROCEDURE — 80048 BASIC METABOLIC PNL TOTAL CA: CPT

## 2021-11-21 PROCEDURE — 36415 COLL VENOUS BLD VENIPUNCTURE: CPT

## 2021-11-21 PROCEDURE — 2700000000 HC OXYGEN THERAPY PER DAY

## 2021-11-21 RX ORDER — DEXAMETHASONE SODIUM PHOSPHATE 10 MG/ML
10 INJECTION INTRAMUSCULAR; INTRAVENOUS EVERY 24 HOURS
Status: DISCONTINUED | OUTPATIENT
Start: 2021-11-22 | End: 2021-11-22 | Stop reason: HOSPADM

## 2021-11-21 RX ADMIN — BARICITINIB 4 MG: 2 TABLET, FILM COATED ORAL at 14:25

## 2021-11-21 RX ADMIN — SODIUM CHLORIDE, PRESERVATIVE FREE 10 ML: 5 INJECTION INTRAVENOUS at 20:49

## 2021-11-21 RX ADMIN — DILTIAZEM HYDROCHLORIDE 120 MG: 120 CAPSULE, COATED, EXTENDED RELEASE ORAL at 09:20

## 2021-11-21 RX ADMIN — APIXABAN 5 MG: 5 TABLET, FILM COATED ORAL at 20:49

## 2021-11-21 RX ADMIN — BENZOCAINE AND MENTHOL 1 LOZENGE: 15; 3.6 LOZENGE ORAL at 09:20

## 2021-11-21 RX ADMIN — APIXABAN 5 MG: 5 TABLET, FILM COATED ORAL at 09:20

## 2021-11-21 RX ADMIN — SODIUM CHLORIDE 25 ML: 9 INJECTION, SOLUTION INTRAVENOUS at 14:27

## 2021-11-21 RX ADMIN — DEXAMETHASONE SODIUM PHOSPHATE 20 MG: 4 INJECTION, SOLUTION INTRA-ARTICULAR; INTRALESIONAL; INTRAMUSCULAR; INTRAVENOUS; SOFT TISSUE at 14:28

## 2021-11-21 ASSESSMENT — PAIN SCALES - GENERAL
PAINLEVEL_OUTOF10: 0

## 2021-11-21 NOTE — PROGRESS NOTES
11/19/21  0619 11/20/21  0802 11/21/21  0746    141 139   K 4.6 4.9 4.5    107 107   CO2 19* 24 22   BUN 28* 27* 27*   CREATININE 1.0 1.1 1.1   GLUCOSE 111* 106* 109*     POC GLUCOSE:  No results for input(s): POCGLU in the last 72 hours. LIVER PROFILE:   Recent Labs     11/19/21  0619 11/20/21  0802 11/21/21  0746   AST 29 22 17   ALT 97* 79* 68*   LABALBU 2.9* 2.9* 2.8*   BILIDIR <0.2 <0.2 <0.2   BILITOT 0.4 0.6 0.5   ALKPHOS 59 56 57     PT/INR: No results for input(s): PROTIME, INR in the last 72 hours. APTT: No results for input(s): APTT in the last 72 hours. UA:No results for input(s): NITRITE, COLORU, PHUR, LABCAST, WBCUA, RBCUA, MUCUS, TRICHOMONAS, YEAST, BACTERIA, CLARITYU, SPECGRAV, LEUKOCYTESUR, UROBILINOGEN, BILIRUBINUR, BLOODU, GLUCOSEU, KETUA, AMORPHOUS in the last 72 hours. Microbiology:  Wound Culture: No results for input(s): WNDABS, ORG in the last 72 hours. Invalid input(s):  LABGRAM  Nasal Culture: No results for input(s): ORG, MRSAPCR in the last 72 hours. Blood Culture: No results for input(s): BC, BLOODCULT2 in the last 72 hours. Fungal Culture:   No results for input(s): FUNGSM in the last 72 hours. No results for input(s): FUNCXBLD in the last 72 hours. CSF Culture:  No results for input(s): COLORCSF, APPEARCSF, CFTUBE, CLOTCSF, WBCCSF, RBCCSF, NEUTCSF, NUMCELLSCSF, LYMPHSCSF, MONOCSF, GLUCCSF, VOLCSF in the last 72 hours. Respiratory Culture:  No results for input(s): Dufm Cheadle in the last 72 hours. AFB:No results for input(s): AFBSMEAR in the last 72 hours. Urine Culture  No results for input(s): LABURIN in the last 72 hours. RADIOLOGY:    XR CHEST PORTABLE   Final Result   No significant change in appearance of of bilateral multifocal pneumonia over   the past 5 days. XR CHEST PORTABLE   Final Result   Multifocal pneumonia in keeping with history of COVID-19.              CONSULTS:    IP CONSULT TO CARDIOLOGY  IP CONSULT TO PHARMACY    ASSESSMENT AND PLAN:      Principal Problem:    Pneumonia due to COVID-19 virus  Active Problems:    Atrial fibrillation with rapid ventricular response (HCC)    Essential hypertension    Acute respiratory failure with hypoxia (HCC)  Resolved Problems:    * No resolved hospital problems. *    Patient is a 78-year-old male who presented with chest pain shortness of breath and fever for the last 7 to 10 days. In the ER patient was found to have Covid pneumonia with A. fib with RVR. Patient continued to be hypoxic. Patient was on a Cardizem drip and was switched to oral Cardizem with good control of his A. fib. He is anticoagulated with Eliquis. For patient's COVID-19 pneumonia is treated with steroids along with baricitinib. A. fib with RVR-now controlled  -Heart rate now controlled. Continue p.o. Cardizem  -Wean off amiodarone drip  -Joel vas score 1  -On therapeutic Lovenox> eliquis    Sepsis present on admission-improving   -With fever and tachycardia and tachypnea  -Secondary to Covid     Acute hypoxic respiratory failure-better today, now on 4 L ( desats overnight)   -Wean as tolerated        COVID-19 pneumonia  -Patient was initially on low-dose steroids and finished 4 days. In the hospital had worsening hypoxia and went went up to 10 L and switch to high-dose steroid day 3  -If patient's oxygen trends up will start baricitinib( D7)  -Inflammatory markers shows improvement   -CXR unchanged         Discussed with patient about goals of care and patient is a full code      DVT Prophylaxis: eliquis  Diet: ADULT DIET; Regular  Code Status: Full Code    PT/OT Eval Status:    Discharge plan - 1-2 days    The patient and / or the family were informed of the results of any tests, a time was given to answer questions, a plan was proposed and they agreed with plan. Discussed with consulting physicians, nursing and social work     The note was completed using EMR.  Every effort was made to ensure accuracy; however, inadvertent computerized transcription errors may be present.        Roberto Carlos Hernadez MD

## 2021-11-22 VITALS
WEIGHT: 208.56 LBS | HEIGHT: 71 IN | OXYGEN SATURATION: 95 % | DIASTOLIC BLOOD PRESSURE: 61 MMHG | RESPIRATION RATE: 18 BRPM | TEMPERATURE: 98.1 F | HEART RATE: 96 BPM | SYSTOLIC BLOOD PRESSURE: 100 MMHG | BODY MASS INDEX: 29.2 KG/M2

## 2021-11-22 LAB
ALBUMIN SERPL-MCNC: 2.7 G/DL (ref 3.4–5)
ALP BLD-CCNC: 65 U/L (ref 40–129)
ALT SERPL-CCNC: 65 U/L (ref 10–40)
ANION GAP SERPL CALCULATED.3IONS-SCNC: 9 MMOL/L (ref 3–16)
AST SERPL-CCNC: 20 U/L (ref 15–37)
BASOPHILS ABSOLUTE: 0 K/UL (ref 0–0.2)
BASOPHILS RELATIVE PERCENT: 0.1 %
BILIRUB SERPL-MCNC: 0.5 MG/DL (ref 0–1)
BILIRUBIN DIRECT: <0.2 MG/DL (ref 0–0.3)
BILIRUBIN, INDIRECT: ABNORMAL MG/DL (ref 0–1)
BUN BLDV-MCNC: 25 MG/DL (ref 7–20)
CALCIUM SERPL-MCNC: 7.9 MG/DL (ref 8.3–10.6)
CHLORIDE BLD-SCNC: 106 MMOL/L (ref 99–110)
CO2: 23 MMOL/L (ref 21–32)
CREAT SERPL-MCNC: 1 MG/DL (ref 0.8–1.3)
EOSINOPHILS ABSOLUTE: 0 K/UL (ref 0–0.6)
EOSINOPHILS RELATIVE PERCENT: 0 %
GFR AFRICAN AMERICAN: >60
GFR NON-AFRICAN AMERICAN: >60
GLUCOSE BLD-MCNC: 120 MG/DL (ref 70–99)
HCT VFR BLD CALC: 44 % (ref 40.5–52.5)
HEMOGLOBIN: 14.5 G/DL (ref 13.5–17.5)
LYMPHOCYTES ABSOLUTE: 0.5 K/UL (ref 1–5.1)
LYMPHOCYTES RELATIVE PERCENT: 2.5 %
MCH RBC QN AUTO: 27.4 PG (ref 26–34)
MCHC RBC AUTO-ENTMCNC: 33.1 G/DL (ref 31–36)
MCV RBC AUTO: 82.7 FL (ref 80–100)
MONOCYTES ABSOLUTE: 1.1 K/UL (ref 0–1.3)
MONOCYTES RELATIVE PERCENT: 5.9 %
NEUTROPHILS ABSOLUTE: 16.3 K/UL (ref 1.7–7.7)
NEUTROPHILS RELATIVE PERCENT: 91.5 %
PDW BLD-RTO: 14.3 % (ref 12.4–15.4)
PLATELET # BLD: 410 K/UL (ref 135–450)
PMV BLD AUTO: 9.3 FL (ref 5–10.5)
POTASSIUM REFLEX MAGNESIUM: 4.8 MMOL/L (ref 3.5–5.1)
RBC # BLD: 5.31 M/UL (ref 4.2–5.9)
SODIUM BLD-SCNC: 138 MMOL/L (ref 136–145)
TOTAL PROTEIN: 5.4 G/DL (ref 6.4–8.2)
WBC # BLD: 17.8 K/UL (ref 4–11)

## 2021-11-22 PROCEDURE — 94680 O2 UPTK RST&XERS DIR SIMPLE: CPT

## 2021-11-22 PROCEDURE — 36415 COLL VENOUS BLD VENIPUNCTURE: CPT

## 2021-11-22 PROCEDURE — 2700000000 HC OXYGEN THERAPY PER DAY

## 2021-11-22 PROCEDURE — 6370000000 HC RX 637 (ALT 250 FOR IP): Performed by: INTERNAL MEDICINE

## 2021-11-22 PROCEDURE — 80048 BASIC METABOLIC PNL TOTAL CA: CPT

## 2021-11-22 PROCEDURE — 2580000003 HC RX 258: Performed by: INTERNAL MEDICINE

## 2021-11-22 PROCEDURE — 80076 HEPATIC FUNCTION PANEL: CPT

## 2021-11-22 PROCEDURE — 85025 COMPLETE CBC W/AUTO DIFF WBC: CPT

## 2021-11-22 PROCEDURE — 6360000002 HC RX W HCPCS: Performed by: INTERNAL MEDICINE

## 2021-11-22 PROCEDURE — 94761 N-INVAS EAR/PLS OXIMETRY MLT: CPT

## 2021-11-22 RX ORDER — DILTIAZEM HYDROCHLORIDE 120 MG/1
120 CAPSULE, COATED, EXTENDED RELEASE ORAL DAILY
Qty: 30 CAPSULE | Refills: 3 | Status: SHIPPED | OUTPATIENT
Start: 2021-11-23 | End: 2021-12-23

## 2021-11-22 RX ORDER — DEXAMETHASONE 6 MG/1
6 TABLET ORAL
Qty: 5 TABLET | Refills: 0 | Status: SHIPPED | OUTPATIENT
Start: 2021-11-22 | End: 2021-11-27

## 2021-11-22 RX ADMIN — APIXABAN 5 MG: 5 TABLET, FILM COATED ORAL at 07:51

## 2021-11-22 RX ADMIN — SODIUM CHLORIDE, PRESERVATIVE FREE 10 ML: 5 INJECTION INTRAVENOUS at 07:52

## 2021-11-22 RX ADMIN — BARICITINIB 4 MG: 2 TABLET, FILM COATED ORAL at 13:52

## 2021-11-22 RX ADMIN — DEXAMETHASONE SODIUM PHOSPHATE 10 MG: 10 INJECTION INTRAMUSCULAR; INTRAVENOUS at 07:52

## 2021-11-22 RX ADMIN — DILTIAZEM HYDROCHLORIDE 120 MG: 120 CAPSULE, COATED, EXTENDED RELEASE ORAL at 07:51

## 2021-11-22 ASSESSMENT — PAIN SCALES - GENERAL
PAINLEVEL_OUTOF10: 0
PAINLEVEL_OUTOF10: 0

## 2021-11-22 NOTE — CARE COORDINATION
CASE MANAGEMENT DISCHARGE SUMMARY:    DISCHARGE DATE: 11/22/21    DISCHARGED TO: Home alone; independent; friend to transport    OXYGEN AGENCY: [facility] - confirmed with patient they would be able to afford co-pay.              PHONE NUMBER: [phone]             FAX NUMBER: [phone]    TRANSPORTATION: Friend             TIME: 5:30 PM     PREFERRED PHARMACY: [facility]     OLIVE Peña, [place], Social Work/Case Management   [phone]  Electronically signed by OLIVE Peña, RACHANA on 11/22/2021 at 2:59 PM

## 2021-11-22 NOTE — PROGRESS NOTES
Discharge orders acknowledged by RN . Discharge teaching completed with pt. AVS reviewed and all questions answered. New prescriptions and current medication regimen reviewed and pt understands schedule. Follow up appointments also reviewed with pt and resources given for discharge. Pt [facility] and understands schedule. IV removed. Telemonitor removed and returned to [facility]. 60+ minutes of education completed with pt. All other education completed. Required core measures completed. Pt vitals WDL. Pt discharged with all belongings to private residence. Pt currently waiting on home oxygen to be set up before leaving. No complications.    Electronically signed by Rik Beckman RN on 11/22/2021 at 3:42 PM

## 2021-11-22 NOTE — PROGRESS NOTES
[facility]    Respiratory Therapy   Home Oxygen Evaluation        Name: Binu Posadas  Medical Record Number: [id number]  Age: 62 y.o. Gender:  male   : 1960  Today's date: 2021  Room: K6X-7795/5271-01      Assessment        /72   Pulse 78   Temp 97.8 °F (36.6 °C) (Oral)   Resp 18   Ht 5' 11\" (1.803 m)   Wt 208 lb 8.9 oz (94.6 kg)   SpO2 91%   BMI 29.09 kg/m²     Patient Active Problem List   Diagnosis    Hypokalemia    Hypertension due to endocrine disorder    Atrial fibrillation with rapid ventricular response (HCC)    Pneumonia due to COVID-19 virus    Essential hypertension    Acute respiratory failure with hypoxia (HCC)       Social History:  Social History     Tobacco Use    Smoking status: Never Smoker    Smokeless tobacco: Never Used   Substance Use Topics    Alcohol use: No    Drug use: Not Currently       Patient Room Air saturation at rest 93%. Patient Room Air saturation upon ambulation 84%. Oxygen saturations of 88% or less on RA qualifies patient for Home Oxygen    Patient resting on 0  lmp  with an oxygen saturation of  93%. Patient ambulated on 0 lpm with an oxygen saturation of 84%. Patient ambulated on 2 lpm with an oxygen saturation of 89%. Patient ambulated on 3 lpm with an oxygen saturation of 94%. Qualifying patient for home oxygen with ambulation and continuous flow  @ 3 lpm.      In your clinical assessment does the Patient Require Portable Oxygen Tanks?     Yes              [facility]  home care [employer] contacted to follow care of patients home oxygen needs on 2021 at 2:06 PM    Patient/caregiver was educated on Home Oxygen process:  Yes      Level of patient/caregiver understanding able to:   [x] Verbalize understanding   [] Demonstrate understanding       [] Teach back        [] Needs reinforcement        []  No available caregiver               []  Other:     Response to education:  Good     Time Spent with Home O2 Set Up:  10  minutes     Dex Goncalves RCP on 11/22/2021 at 2:06 PM

## 2021-11-22 NOTE — PROGRESS NOTES
Pt's ride here. Pt transported off of unit via wheelchair with all belongings and on 3L NC. No complications.  Electronically signed by Alfredo Lima RN on 11/22/2021 at 6:07 PM

## 2021-11-22 NOTE — CARE COORDINATION
Discharge Planning:   Patient has no insurance. Requiring 3LO2. Called to [facility] to notify of possible DME needs. Left voicemail notifying of possible need and lack of insurance. OLIVE Boyd, [place], Social Work/Case Management   [phone]  Electronically signed by OLIVE Boyd LSW on 11/22/2021 at 1:15 PM    Patient qualifies for 3LO2. Called to patient to discuss. Patient confirmed they would be able to pay oxygen co-pay around $150.00 per month. Notified [facility].    Electronically signed by OLIVE Boyd LSW on 11/22/2021 at 2:55 PM

## 2021-11-22 NOTE — CARE COORDINATION
[facility] rep delivered an Oxygen Concentrator to outside the patient's room and reviewed insurance coverage, copays, oxygen safety, and equipment setup with the patient via phone due to patient's isolation status. Notified RN.     Thank you for the referral.  Electronically signed by Elsie Manuel on 11/22/2021 at 5:19 PM Cell ph# [phone]

## 2021-11-22 NOTE — PROGRESS NOTES
Progress Note  Admit Date: 11/12/2021      PCP: Jennifer Maddox MD     CC: F/U for for for diarrhea    Days in hospital:  9    SUBJECTIVE / Interval History:  Patient feels okay. Now on 3 L   hypoxic on ambulation      Allergies  Amoxicillin and Pcn [penicillins]    Medications    Scheduled Meds:   dexamethasone  10 mg IntraVENous Q24H    dilTIAZem  120 mg Oral Daily    baricitinib  4 mg Oral Q24H    apixaban  5 mg Oral BID    sodium chloride flush  10 mL IntraVENous 2 times per day     Continuous Infusions:   sodium chloride 25 mL (11/21/21 1427)       PRN Meds:  benzocaine-menthol, [Held by provider] sodium chloride flush, sodium chloride, ondansetron, polyethylene glycol, acetaminophen **OR** acetaminophen    Vitals    /74   Pulse 76   Temp 97.9 °F (36.6 °C) (Oral)   Resp 18   Ht 5' 11\" (1.803 m)   Wt 208 lb 8.9 oz (94.6 kg)   SpO2 92%   BMI 29.09 kg/m²     Exam:    Gen: No distress. Eyes: PERRL. No sclera icterus. No conjunctival injection. ENT: No discharge. Pharynx clear. External appearance of ears and nose normal.  Neck: Trachea midline. No obvious mass. Resp: No accessory muscle use. No crackles. No wheezes. No rhonchi. No dullness on percussion. CV: Regular rate. Regular rhythm. No murmur or rub. No edema. GI: Non-tender. Non-distended. No hernia. Skin: Warm, dry, normal texture and turgor. No nodule on exposed extremities. Lymph: No cervical LAD. No supraclavicular LAD. M/S: No cyanosis. No clubbing. No joint deformity. Neuro: Moves all four extremities. CN 2-12 tested, no defect noted. Psych: Oriented x 3. No anxiety. Awake. Alert. Intact judgement and insight.     Data    LABS  CBC:   Recent Labs     11/20/21  0802 11/21/21  0746 11/22/21  0620   WBC 17.4* 18.4* 17.8*   HGB 14.4 14.5 14.5   HCT 44.2 44.1 44.0   MCV 82.5 82.5 82.7    377 410     BMP:   Recent Labs     11/20/21  0802 11/21/21  0746 11/22/21  0620    139 138   K 4.9 4.5 4.8    107 106   CO2 24 22 23   BUN 27* 27* 25*   CREATININE 1.1 1.1 1.0   GLUCOSE 106* 109* 120*     POC GLUCOSE:  No results for input(s): POCGLU in the last 72 hours. LIVER PROFILE:   Recent Labs     11/20/21  0802 11/21/21  0746 11/22/21  0620   AST 22 17 20   ALT 79* 68* 65*   LABALBU 2.9* 2.8* 2.7*   BILIDIR <0.2 <0.2 <0.2   BILITOT 0.6 0.5 0.5   ALKPHOS 56 57 65     PT/INR: No results for input(s): PROTIME, INR in the last 72 hours. APTT: No results for input(s): APTT in the last 72 hours. UA:No results for input(s): NITRITE, COLORU, PHUR, LABCAST, WBCUA, RBCUA, MUCUS, TRICHOMONAS, YEAST, BACTERIA, CLARITYU, SPECGRAV, LEUKOCYTESUR, UROBILINOGEN, BILIRUBINUR, BLOODU, GLUCOSEU, KETUA, AMORPHOUS in the last 72 hours. Microbiology:  Wound Culture: No results for input(s): WNDABS, ORG in the last 72 hours. Invalid input(s):  LABGRAM  Nasal Culture: No results for input(s): ORG, MRSAPCR in the last 72 hours. Blood Culture: No results for input(s): BC, BLOODCULT2 in the last 72 hours. Fungal Culture:   No results for input(s): FUNGSM in the last 72 hours. No results for input(s): FUNCXBLD in the last 72 hours. CSF Culture:  No results for input(s): COLORCSF, APPEARCSF, CFTUBE, CLOTCSF, WBCCSF, RBCCSF, NEUTCSF, NUMCELLSCSF, LYMPHSCSF, MONOCSF, GLUCCSF, VOLCSF in the last 72 hours. Respiratory Culture:  No results for input(s): Joel Nesbitt in the last 72 hours. AFB:No results for input(s): AFBSMEAR in the last 72 hours. Urine Culture  No results for input(s): LABURIN in the last 72 hours. RADIOLOGY:    XR CHEST PORTABLE   Final Result   No significant change in appearance of of bilateral multifocal pneumonia over   the past 5 days. XR CHEST PORTABLE   Final Result   Multifocal pneumonia in keeping with history of COVID-19.              CONSULTS:    IP CONSULT TO CARDIOLOGY  IP CONSULT TO PHARMACY    ASSESSMENT AND PLAN:      Principal Problem:    Pneumonia due to COVID-19 virus  Active Problems: Atrial fibrillation with rapid ventricular response (HCC)    Essential hypertension    Acute respiratory failure with hypoxia (HCC)  Resolved Problems:    * No resolved hospital problems. *    Patient is a 50-year-old male who presented with chest pain shortness of breath and fever for the last 7 to 10 days. In the ER patient was found to have Covid pneumonia with A. fib with RVR. Patient continued to be hypoxic. Patient was on a Cardizem drip and was switched to oral Cardizem with good control of his A. fib. He is anticoagulated with Eliquis. For patient's COVID-19 pneumonia is treated with steroids along with baricitinib. Pt continued to need 3 L of O2 and will be discharged with home o2. A. fib with RVR-now controlled  -Heart rate now controlled. Continue p.o. Cardizem  -Wean off amiodarone drip  -Joel vas score 1  -On therapeutic Lovenox> eliquis    Sepsis present on admission-improving   -With fever and tachycardia and tachypnea  -Secondary to Covid     Acute hypoxic respiratory failure-now on 3 L, qualifies for home o2  -Wean as tolerated        COVID-19 pneumonia  -Patient was initially on low-dose steroids and finished 4 days. In the hospital had worsening hypoxia and went went up to 10 L and switch to high-dose steroid day 5, now back to 10 mg  -If patient's oxygen trends up will start baricitinib( D10)  -Inflammatory markers shows improvement   -CXR unchanged         Discussed with patient about goals of care and patient is a full code      DVT Prophylaxis: eliquis  Diet: ADULT DIET; Regular  Code Status: Full Code    PT/OT Eval Status:    Discharge plan -possibly discharge home with home oxygen    The patient and / or the family were informed of the results of any tests, a time was given to answer questions, a plan was proposed and they agreed with plan. Discussed with consulting physicians, nursing and social work     The note was completed using EMR.  Every effort was made to ensure accuracy;

## 2021-11-22 NOTE — DISCHARGE SUMMARY
for home o2  -Wean as tolerated        COVID-19 pneumonia  -Patient was initially on low-dose steroids and finished 4 days. In the hospital had worsening hypoxia and went went up to 10 L and switch to high-dose steroid day 5, now back to 10 mg  -If patient's oxygen trends up will start baricitinib( D10)  -Inflammatory markers shows improvement   -CXR unchanged       Consults:     IP CONSULT TO CARDIOLOGY  IP CONSULT TO PHARMACY        Disposition: home    Discharged Condition: Stable    Code Status: Full Code    Activity: activity as tolerated    Diet: regular diet        Labs: For convenience and continuity at follow-up the following most recent labs are provided:    CBC:   Lab Results   Component Value Date    WBC 17.8 11/22/2021    HGB 14.5 11/22/2021    HCT 44.0 11/22/2021     11/22/2021       RENAL:   Lab Results   Component Value Date     11/22/2021    K 4.8 11/22/2021     11/22/2021    CO2 23 11/22/2021    BUN 25 11/22/2021    CREATININE 1.0 11/22/2021           Discharge Medications:   Current Discharge Medication List           Details   apixaban (ELIQUIS) 5 MG TABS tablet Take 1 tablet by mouth 2 times daily  Qty: 60 tablet, Refills: 1      dilTIAZem (CARDIZEM CD) 120 MG extended release capsule Take 1 capsule by mouth daily  Qty: 30 capsule, Refills: 3      dexamethasone (DECADRON) 6 MG tablet Take 1 tablet by mouth daily (with breakfast) for 5 days  Qty: 5 tablet, Refills: 0      benzocaine-menthol (CEPACOL SORE THROAT) 15-3.6 MG lozenge Place 1 lozenge inside cheek as needed for Sore Throat  Qty: 21 lozenge, Refills: 0             Future Appointments   Date Time Provider Jose Manuel Reeder   6/13/2022  9:30 AM MD Sai Prieto - [facility]       Time Spent on discharge is more than 45 minutes in the examination, evaluation, counseling and review of medications and discharge plan. Signed:  Julio Garcia MD   11/22/2021    The note was completed using EMR.  Every effort was made to ensure accuracy; however, inadvertent computerized transcription errors may be present. Thank you Sujata Tamayo MD for the opportunity to be involved in this patient's care. If you have any questions or concerns please feel free to contact me at [phone].

## 2021-11-22 NOTE — PLAN OF CARE
Problem: Falls - Risk of:  Goal: Will remain free from falls  Description: Will remain free from falls  Outcome: Ongoing     Problem: Airway Clearance - Ineffective  Goal: Achieve or maintain patent airway  Outcome: Ongoing     Problem: Gas Exchange - Impaired  Goal: Absence of hypoxia  Outcome: Ongoing     Problem: Isolation Precautions - Risk of Spread of Infection  Goal: Prevent transmission of infection  Outcome: Ongoing     Problem: Nutrition Deficits  Goal: Optimize nutritional status  Outcome: Ongoing     Problem: Fatigue  Goal: Verbalize increase energy and improved vitality  Outcome: Ongoing

## 2021-11-23 ENCOUNTER — CARE COORDINATION (OUTPATIENT)
Dept: CASE MANAGEMENT | Age: 61
End: 2021-11-23

## 2021-11-23 DIAGNOSIS — J96.01 ACUTE RESPIRATORY FAILURE WITH HYPOXIA (HCC): Primary | ICD-10-CM

## 2021-11-23 NOTE — CARE COORDINATION
[facility] Transitions Initial Follow Up Call    Call within 2 business days of discharge: Yes    Patient: Greg Branch Patient : 1960   MRN: [id number]  Reason for Admission: COVID+, afib  Discharge Date: 21 RARS: Readmission Risk Score: 10 ( )      Last Discharge [address]       Complaint Diagnosis Description Type Department Provider    21 Cough; Positive For Covid-19 New onset atrial fibrillation ([facility] [address] . .. ED to Hosp-Admission (Discharged) (ADMITTED) Sara Flores MD; Estelle Wilcox. .. Spoke with: DILIA Stewart 80: [facility]    Non-face-to-face services provided:  Obtained and reviewed discharge summary and/or continuity of care documents    Transitions of Care Initial Call    Was this an external facility discharge? No Discharge Facility: NA    Challenges to be reviewed by the provider   Additional needs identified to be addressed with provider: No  none             Method of communication with provider : phone      Advance Care Planning:   Does patient have an Advance Directive: decision maker updated. Advance Care Planning   Healthcare Decision Maker:    Primary Decision Maker: Dell Naylor - Child - [phone]    Was this a readmission? No  Patient stated reason for admission: CP, SOB  Patients top risk factors for readmission: medical condition-PNA d/t COVID+, afib    Care Transition Nurse (CTN) contacted the patient by telephone to perform post hospital discharge assessment. Verified name and  with patient as identifiers. Provided introduction to self, and explanation of the CTN role. CTN reviewed discharge instructions, medical action plan and red flags with patient who verbalized understanding. Patient given an opportunity to ask questions and does not have any further questions or concerns at this time. Were discharge instructions available to patient? Yes.  Reviewed appropriate site of care based on symptoms and resources available to patient including: PCP. The patient agrees to contact the PCP office for questions related to their healthcare. Medication reconciliation was performed with patient, who verbalizes understanding of administration of home medications. Advised obtaining a 90-day supply of all daily and as-needed medications. Covid Risk Education     Educated patient about risk for severe COVID-19 due to risk factors according to CDC guidelines. LPN CC reviewed discharge instructions, medical action plan and red flag symptoms with the patient who verbalized understanding. Discussed COVID vaccination status: No. Education provided on COVID-19 vaccination as appropriate. Discussed exposure protocols and quarantine with CDC Guidelines. Patient was given an opportunity to verbalize any questions and concerns and agrees to contact LPN CC or health care provider for questions related to their healthcare. Reviewed and educated patient on any new and changed medications related to discharge diagnosis. Was patient discharged with a pulse oximeter? No Discussed and confirmed pulse oximeter discharge instructions and when to notify provider or seek emergency care. Patient verified  and was pleasant and agreeable to transition calls. States he is \"holding in there. \" Reports DAVEY, sl cough that is occ productive. Denies fever/chills, HA, CP, palpitations, N/V/D. Appetite ok. Denies problems with bowels or bladder. Full medication reconciliation and 1111f completed. States his care is wrecked and he isn't sure how he would get to [place]. Declined SW referral at this time, states he is going to try to work some things out. Denies any acute needs at present time. Agreeable to f/u calls. Educated on the use of urgent care or physicians 24 hr access line if assistance is needed after hours. LPN CC provided contact information. Plan for follow-up call in 5-7 days based on severity of symptoms and risk factors.   Ayleen Nayeli Hefty, LPN CC  Care Transitions  [phone]    Care Transitions 24 Hour Call    Were you discharged with any Home Care or Post Acute Services: Yes  Post Acute Services:  Other (Comment: [facility] O2 @ 3 lpm)  Care Transitions Interventions         Follow Up  Future Appointments   Date Time Provider Jose Manuel Reeder   6/13/2022  9:30 AM Yen Agosto MD [address] LPN

## 2021-11-29 ENCOUNTER — NURSE TRIAGE (OUTPATIENT)
Dept: OTHER | Facility: CLINIC | Age: 61
End: 2021-11-29

## 2021-11-29 NOTE — TELEPHONE ENCOUNTER
Received call from VIVIANE RANDY [facility] with Red Flag Complaint. Brief description of triage: covid sx started on 11/7. Diagnosed on 11/11 and admitted to hospital.  Was in a-fib. Sent home on 3L O2 and placed on elequis. Still very SOB. Walking to [place] is tiring and needs to put O2 back on right away. Yesterday was able to sit without O2 on for 1 hour before he had to put back on. Needs f/u appointment with PCP    Triage indicates for patient to pcp in 3 days    Care advice provided, patient verbalizes understanding; denies any other questions or concerns; instructed to call back for any new or worsening symptoms. Writer provided warm transfer to [facility] for appointment scheduling. Attention Provider: Thank you for allowing me to participate in the care of your patient. The patient was connected to triage in response to information provided to the ECC/PSC. Please do not respond through this encounter as the response is not directed to a shared pool. Reason for Disposition   Patient wants to be seen    Answer Assessment - Initial Assessment Questions  1. COVID-19 DIAGNOSIS: \"Who made your Coronavirus (COVID-19) diagnosis? \" \"Was it confirmed by a positive lab test?\" If not diagnosed by a HCP, ask \"Are there lots of cases (community spread) where you live? \" (See public health department website, if unsure)      at hospital    2. COVID-19 EXPOSURE: \"Was there any known exposure to COVID before the symptoms began? \" CDC Definition of close contact: within 6 feet (2 meters) for a total of 15 minutes or more over a 24-hour period. N/a    3. ONSET: \"When did the COVID-19 symptoms start? \"       Sx started on 11/7, got tested 11/11 and was positive    4. WORST SYMPTOM: \"What is your worst symptom? \" (e.g., cough, fever, shortness of breath, muscle aches)      Shortness of breath    5. COUGH: \"Do you have a cough? \" If Yes, ask: \"How bad is the cough? \"        Very FEVER: \"Do you have a fever? \" If Yes, ask: \"What is your temperature, how was it measured, and when did it start? \"      *No Answer*  9. BREATHING DIFFICULTY: Jamie Arguelles you having any trouble breathing? \" If Yes, ask: \"How bad is your breathing? \" (e.g., mild, moderate, severe)     - MILD: No SOB at rest, mild SOB with walking, speaks normally in sentences, can lay down, no retractions, pulse < 100.     - MODERATE: SOB at rest, SOB with minimal exertion and prefers to sit, cannot lie down flat, speaks in phrases, mild retractions, audible wheezing, pulse 100-120.     - SEVERE: Very SOB at rest, speaks in single words, struggling to breathe, sitting hunched forward, retractions, pulse > 120        *No Answer*  10. HIGH RISK DISEASE: \"Do you have any chronic medical problems? \" (e.g., asthma, heart or lung disease, weak immune system, obesity, etc.)        *No Answer*  11. PREGNANCY: \"Is there any chance you are pregnant? \" \"When was your last menstrual period? \"        *No Answer*  12. OTHER SYMPTOMS: \"Do you have any other symptoms? \"  (e.g., fatigue, headache, muscle pain, weakness)        *No Answer*    Protocols used: COVID-19 - PERSISTING SYMPTOMS FOLLOW-UP CALL-ADULT-[place], COVID-19 - DIAGNOSED OR SUSPECTED-ADULT-[place]

## 2021-12-02 ENCOUNTER — CARE COORDINATION (OUTPATIENT)
Dept: CASE MANAGEMENT | Age: 61
End: 2021-12-02

## 2021-12-02 ENCOUNTER — OFFICE VISIT (OUTPATIENT)
Dept: FAMILY MEDICINE CLINIC | Age: 61
End: 2021-12-02

## 2021-12-02 VITALS
HEART RATE: 80 BPM | DIASTOLIC BLOOD PRESSURE: 82 MMHG | WEIGHT: 211 LBS | HEIGHT: 71 IN | TEMPERATURE: 97.7 F | SYSTOLIC BLOOD PRESSURE: 126 MMHG | BODY MASS INDEX: 29.54 KG/M2

## 2021-12-02 DIAGNOSIS — I48.91 ATRIAL FIBRILLATION, UNSPECIFIED TYPE (HCC): ICD-10-CM

## 2021-12-02 DIAGNOSIS — Z09 HOSPITAL DISCHARGE FOLLOW-UP: Primary | ICD-10-CM

## 2021-12-02 PROCEDURE — 99999 PR OFFICE/OUTPT VISIT,PROCEDURE ONLY: CPT | Performed by: FAMILY MEDICINE

## 2021-12-02 NOTE — CARE COORDINATION
[facility] Transitions Follow Up Call    2021    Patient: Binu Posadas  Patient : 1960   MRN: <[id number]>  Reason for Admission: Covid  Discharge Date: 21 RARS: Readmission Risk Score: 10 ( )    Spoke with: na    Patient had HFU visit today will try back on another day. Care Transitions Subsequent and Final Call    Subsequent and Final Calls  Care Transitions Interventions  Other Interventions:            Follow Up  Future Appointments   Date Time Provider Jose Manuel Reeder   2022  1:15 PM [facility]   2022  9:30 AM Jennifer Maddox MD [facility]       Irma Vásquez LPN

## 2021-12-02 NOTE — PROGRESS NOTES
Subjective:      Patient ID: Jairo Weiss is a 62 y.o. male. Chief Complaint   Patient presents with    Follow-Up from [facility] 11- \"COVID\"        Patient presents with: Follow-Up from Hospital: [facility] 11- \"COVID\"    Here for the above  He is on the meds  Chart reviewed    No fever  He is sob without the O2  On 3L     bm have  Been slow  Eating ok  Urine is passing well  No cp     YOB: 1960    Date of Visit:  12/2/2021     -- Amoxicillin    -- Pcn (Penicillins)     Current Outpatient Medications:  apixaban (ELIQUIS) 5 MG TABS tablet, Take 1 tablet by mouth 2 times daily, Disp: 60 tablet, Rfl: 1  dilTIAZem (CARDIZEM CD) 120 MG extended release capsule, Take 1 capsule by mouth daily, Disp: 30 capsule, Rfl: 3    No current facility-administered medications for this visit.      ---------------------------               12/02/21                      1036         ---------------------------   BP:          126/82         Site:    Left Upper Arm     Position:     Sitting        Cuff Size:   Large Adult      Pulse:         80           Temp:   97.7 °F (36.5 °C)   TempSrc:    Temporal        Weight: 211 lb (95.7 kg)    Height: 5' 11\" (1.803 m)   ---------------------------  Body mass index is 29.43 kg/m². Wt Readings from Last 3 Encounters:  12/02/21 : 211 lb (95.7 kg)  11/22/21 : 208 lb 8.9 oz (94.6 kg)  07/17/21 : 216 lb 14.9 oz (98.4 kg)    BP Readings from Last 3 Encounters:  12/02/21 : 126/82  11/22/21 : 100/61  07/17/21 : (!) 148/86            Review of Systems    Objective:   Physical Exam  Constitutional:       General: He is not in acute distress. Appearance: Normal appearance. He is well-developed. He is not ill-appearing or diaphoretic. Cardiovascular:      Rate and Rhythm: Normal rate. Rhythm irregular. Heart sounds: Normal heart sounds. No murmur heard. No friction rub. No gallop.     Pulmonary: Effort: Pulmonary effort is normal. No tachypnea, accessory muscle usage or respiratory distress. Breath sounds: Normal breath sounds. No decreased breath sounds, wheezing, rhonchi or rales. Chest:   Breasts:      Right: No supraclavicular adenopathy. Left: No supraclavicular adenopathy. Lymphadenopathy:      Cervical: No cervical adenopathy. Upper Body:      Right upper body: No supraclavicular adenopathy. Left upper body: No supraclavicular adenopathy. Skin:     General: Skin is warm and dry. Coloration: Skin is not pale. Neurological:      Mental Status: He is alert. Assessment:        Diagnosis Orders   1. Hospital discharge follow-up     2. Atrial fibrillation, unspecified type [facility])  Rosalina South MD, Cardiology, [facility]     Recent covid illness  D/c from hospital on 11/22/21 reviewed.   He was treated  He has not been referred to cardiology       Plan:      Do see the cardiology  Call me weekly  Do see me in January         Brock Ayoub MD

## 2021-12-08 ENCOUNTER — OFFICE VISIT (OUTPATIENT)
Dept: CARDIOLOGY CLINIC | Age: 61
End: 2021-12-08

## 2021-12-08 VITALS
HEART RATE: 118 BPM | WEIGHT: 209 LBS | BODY MASS INDEX: 29.26 KG/M2 | SYSTOLIC BLOOD PRESSURE: 116 MMHG | DIASTOLIC BLOOD PRESSURE: 78 MMHG | HEIGHT: 71 IN | OXYGEN SATURATION: 91 %

## 2021-12-08 DIAGNOSIS — I48.0 PAF (PAROXYSMAL ATRIAL FIBRILLATION) (HCC): Primary | ICD-10-CM

## 2021-12-08 PROCEDURE — 99205 OFFICE O/P NEW HI 60 MIN: CPT | Performed by: INTERNAL MEDICINE

## 2021-12-08 PROCEDURE — 93000 ELECTROCARDIOGRAM COMPLETE: CPT | Performed by: INTERNAL MEDICINE

## 2021-12-08 NOTE — PROGRESS NOTES
Past Surgical History:   Procedure Laterality Date    TONSILLECTOMY      as a child       Allergies: Allergies   Allergen Reactions    Amoxicillin     Pcn [Penicillins]        Medication:   Prior to Admission medications    Medication Sig Start Date End Date Taking? Authorizing Provider   apixaban (ELIQUIS) 5 MG TABS tablet Take 1 tablet by mouth 2 times daily 11/22/21  Yes Val Escudero MD   dilTIAZem (CARDIZEM CD) 120 MG extended release capsule Take 1 capsule by mouth daily 11/23/21  Yes Val Escudero MD       Social History:   reports that he has never smoked. He has never used smokeless tobacco. He reports previous drug use. He reports that he does not drink alcohol. Family History:  family history includes Diabetes in his mother; Heart Disease in his brother; High Blood Pressure in his mother. Reviewed. Denies family history of sudden cardiac death, arrhythmia, premature CAD    Review of System:    · General ROS: negative for - chills, fever   · Psychological ROS: negative for - anxiety or depression  · Ophthalmic ROS: negative for - eye pain or loss of vision  · ENT ROS: negative for - epistaxis, headaches, nasal discharge, sore throat   · Allergy and Immunology ROS: negative for - hives, nasal congestion   · Hematological and Lymphatic ROS: negative for - bleeding problems, blood clots, bruising or jaundice  · Endocrine ROS: negative for - skin changes, temperature intolerance or unexpected weight changes  · Respiratory ROS: negative for - cough, hemoptysis, pleuritic pain, SOB, sputum changes or wheezing  · Cardiovascular ROS: Per HPI.    · Gastrointestinal ROS: negative for - abdominal pain, blood in stools, diarrhea, hematemesis, melena, nausea/vomiting or swallowing difficulty/pain  · Genito-Urinary ROS: negative for - dysuria or incontinence  · Musculoskeletal ROS: negative for - joint swelling or muscle pain  · Neurological ROS: negative for - confusion, dizziness, gait disturbance, headaches, numbness/tingling, seizures, speech problems, tremors, visual changes or weakness  · Dermatological ROS: negative for - rash    Physical Examination:  Vitals:    21 1027   BP: 116/78   Pulse: 118   SpO2: 91%       · Constitutional: Oriented. No distress. · Head: Normocephalic and atraumatic. · Mouth/Throat: Oropharynx is clear and moist.   · Eyes: Conjunctivae normal. EOM are normal.   · Neck: Normal range of motion. Neck supple. No rigidity. No JVD present. · Cardiovascular: Normal rate, regular rhythm, S1&S2 and intact distal pulses. · Pulmonary/Chest: Bilateral respiratory sounds. No wheezes. No rhonchi. · Abdominal: Soft. Bowel sounds present. No distension, No tenderness. · Musculoskeletal: No tenderness. No edema    · Lymphadenopathy: Has no cervical adenopathy. · Neurological: Alert and oriented. Cranial nerve appears intact, No Gross deficit   · Skin: Skin is warm and dry. No rash noted. · Psychiatric: Has a normal mood, affect and behavior     Labs:  Reviewed. EC2021 Atrial ffibrillation  with v-rate of 115 bpm with QRS duration 92 ms. No pathologic Q waves, ventricular pre-excitation, or QT prolongation. Studies:   1. Event monitor: n/s       2. Echo: n/a      3. Stress Test:  n/a      4. Cath: n/a    I independently reviewed and interpreted the ECG, MCOT, echocardiogram, stress test, and coronary angiography/PCI results and used them for my plan of care. Procedures:  1. Assessment/Plan:     Atrial fibrillation  -First seen on EKG 11/15/2021  -He has a [id number] Score 1 (HTN)   -On Eliquis 5 mg BID for  thromboembolic risk reduction.  -Tolerating well no signs or symptoms of abnormal bruising or bleeding. According to the ACC/HRS guidelines, with a [id number] score of 1, he is not necessarily indicated for [address] [place] [address] at this time.  However, if he wishes to pursue ablation, she will need to be on [address] [place] [address] for the next 3 months after ablation.    - Treatment options for atrial fibrillation including cardioversion, anti-arrhythmic medication therapy, rate control strategy, oral anticoagulation, and atrial fibrillation ablation were discussed with patient. Risks, benefits and alternative of each treatment options (care, treatment, and services) were explained. The patient was also presented reasonable alternatives to the proposed care, treatment, and services. The discussion I have had with the patient encompassed risks, benefits, and side effects related to the alternatives and the risks related to not receiving the proposed care, treatment and services. All questions were answered. Recommend that he undergo CV with MARTY to restore normal sinus rhythm while awaiting for the ablation procedure. Patient agreeable. We educated the patient that atrial fibrillation is a worsening and progressive disease, with more frequent episodes that will ensue. Subsequent episodes usually become more sustained to the extent that many individuals would then develop persistent atrial fibrillation. Once persistence is reached, permanent atrial fibrillation is inevitable. We also discussed the fact that atrial fibrillation is associated with stroke, including life-threatening stroke, and therefore oral anticoagulation is warranted depending on the patient's [id number] score. We discussed different management options for atrial fibrillation including their risks and benefits. These options include use of cardioversion (mainly for persisting atrial fibrillation or atrial flutter) which provides an effective immediate therapy with success rates of 75% or higher, but it provides no short nor long term efficacy.   Anti-arrhythmic medications provide a very effective short term therapy, but even with our most potent anti-arrhythmic medication there is limited long term efficacy (clinical studies have shown that 40% of patients remain atrial fibrillation-free after 4 years of follow-up after starting one of the more powerful anti-arrhythmic medication (amiodarone), and, if extrapolated, may have further diminishing success as time goes on). Atrial fibrillation ablation is a potentially curative therapy with very reasonable success rate after a first time procedure and with improving success rates with subsequent procedures. The risks, benefits and alternatives of the atrial fibrillation ablation procedure were discussed with the patient. The risks including, but not limited to, bleeding, infection, radiation exposure, injury to vascular, cardiac and surrounding structures (including pneumothorax), stroke, cardiac perforation, tamponade, need for emergent open heart surgery, need for pacemaker implantation, injury to the phrenic nerve, injury to the esophagus, myocardial infarction and death were discussed in detail. The patient was also counseled at length about the risks of tan Covid-19 in the ghislaine-operative and post-operative states including the recovery window of their procedure. The patient was made aware that tan Covid-19 after a surgical procedure may worsen their prognosis for recovering from the virus and lend to a higher morbidity and or mortality risk. The patient was given the option of postponing their procedure. The patient was also presented reasonable alternatives to the proposed care, treatment, and services. The discussion I have had with the patient encompassed risks, benefits, and side effects related to the alternatives and the risks related to not receiving the proposed care, treatment and services. The patient opted to proceed with the atrial fibrillation ablation. We will schedule for a radiofrequency ablation with [employer] system with a MARTY procedure immediately prior to this ablation. A cardiac CTA will be ordered for pulmonary vein mapping prior to this procedure.  We will hold Eliquis  for 12 hours prior to procedure. We will order BMP, CBC, PT/INR, and Type & Screen prior to the procedure.     -Encouraged to watch \"That Sugar Film\" on [employer], which discusses the negative impact of processed sugar has on the body. -Much time was spent counseling the patient on healthier lifestyle, following a low sodium diet <2,400 mg of sodium a day and dramatically decreasing the intake of processed sugar.    -Patient educated to eat a diet which includes organic fruits, vegetables and meats. Follow up three months after ablation procedure with Sherie Gold CNP. He will follow up one to two weeks after cardioversion with Sherie Gold CNP. Thank you for allowing me to participate in the care of Mumtaz Uribe. All questions and concerns were addressed to the patient/family. Alternatives to my treatment were discussed. This note was scribed in the presence of Dr. Makayla Wilson MD by Andree Palafox RN. The scribe's documentation has been prepared under my direction and personally reviewed by me in its entirety. I confirm that the note above accurately reflects all work, physical examination, the discussion of treatments and procedures, and medical decision making performed by me.     Makayla Wilson MD, MS, [facility]  Cardiac Electrophysiology  [address]  [address] [facility], [address] [place] [address] [facility] [address]  [phone]

## 2021-12-08 NOTE — PATIENT INSTRUCTIONS
would like to get the COVID testing completed to make sure it is one of the participating location. If you complete the testing at a location other than [facility] please bring results with you the day of your procedure. 8. Please have a responsible adult to drive you home after procedure, you should go home same day, but there is always a possibility of an overnight stay. 9. Cath lab will provide you with all post procedure instructions  10. A 3 month follow up will be scheduled with Dr. Emanuel Barrett Nurse practitioner Dayna Britt CNP post procedure                                          [address] [place] [address]  Phone: [phone]  The hours are Mon -Fri.  7:00 am  5:00 pm   No appointment necessary    You may complete pre procedure labs & COVID test at this location. COVID TESTING CAN ONLY BE COMPLETED   Between hours of 12:30 pm & 4:30 pm  Monday through Friday  No Appointment necessary. There are parking spaces behind the [address] [place] [address] were you see Dr. Chrissie Ramey designated for COVID test where you can pull up and call number listed on the pole and they will come out and complete covid test while you are in your car.                      ________________________________________________________________________________________________        Patient Education        Learning About Atrial Fibrillation  What is atrial fibrillation? Atrial fibrillation (say \"AY-tree-carlos ptv-cyhu-JXY-shun\") is a common type of irregular heartbeat (arrhythmia). Normally, the heart beats in a strong, steady rhythm. In atrial fibrillation, a problem with the heart's electrical system causes the two upper chambers of the heart (called the atria) to quiver, or fibrillate. Atrial fibrillation can be dangerous. This is because if the heartbeat isn't strong and steady, blood can collect, or pool, in the atria.  And pooled blood is more likely to form clots. Clots can travel to the brain, block blood flow, and cause a stroke. Atrial fibrillation can also lead to heart failure. This condition also upsets the normal rhythm between the atria and the lower chambers of the heart. (These chambers are called the ventricles.) The ventricles may beat fast and without a regular rhythm. What are the symptoms? Some people feel symptoms when they have episodes of atrial fibrillation. But other people don't notice any symptoms. If you have symptoms, you may feel:  · A fluttering, racing, or pounding feeling in your chest called palpitations. · Weak or tired. · Dizzy or lightheaded. · Short of breath. · Chest pain. · Confused. You may notice signs of atrial fibrillation when you check your pulse. Your pulse may seem uneven or fast.  What can you expect when you have atrial fibrillation? At first, spells of atrial fibrillation may come on suddenly and last a short time. They may go away on their own or with treatment. Over time, the spells may last longer and occur more often. They often don't go away on their own. How is it treated? Treatments can help you feel better and prevent future problems, especially stroke and heart failure. Your treatment will depend on the cause of your atrial fibrillation, your symptoms, and your risk for stroke. Types of treatment include:  · Heart rate treatment. Medicine may be used to slow your heart rate. Your heartbeat may still be irregular. But these medicines keep your heart from beating too fast. They may also help relieve symptoms. · Heart rhythm treatment. Different treatments may be used to try to stop atrial fibrillation and keep it from returning. They can also relieve symptoms. These treatments include medicine, electrical cardioversion to shock the heart back to a normal rhythm, a procedure called catheter ablation, and heart surgery. · Stroke prevention.  You and your doctor can decide how to lower your risk. You may decide to take a blood-thinning medicine called an anticoagulant. What is a heart-healthy lifestyle for atrial fibrillation? You can live well and help manage atrial fibrillation by having a heart-healthy lifestyle. This lifestyle may help reduce how often you have episodes of atrial fibrillation. Don't smoke. Avoid secondhand smoke too. Quitting smoking is the best thing you can do for your heart. Be active. Talk to your doctor about what type and level of exercise is safe for you. Eat a heart-healthy diet. These foods include vegetables, fruits, nuts, beans, lean meat, fish, and whole grains. Limit sodium, alcohol, and sugar. Avoid alcohol if it triggers symptoms. Stay at a healthy weight. Lose weight if you need to. Losing weight can help relieve symptoms. Manage other health problems. These problems include diabetes, high blood pressure, and high cholesterol. If you think you may have a problem with alcohol or drug use, talk to your doctor. Manage stress. Options like yoga, biofeedback, and meditation may help. Where can you learn more? Go to https://H?REL.LIN TV. org and sign in to your Nintu Oy account. Enter I667 in the Perpetuuiti TechnoSoft Services box to learn more about \"Learning About Atrial Fibrillation. \"     If you do not have an account, please click on the \"Sign Up Now\" link. Current as of: April 29, 2021               Content Version: 13.0  © 7187-0217 [employer]. Care instructions adapted under license by [facility] ([facility]). If you have questions about a medical condition or this instruction, always ask your healthcare professional. [place] [address] any warranty or liability for your use of this information. Patient Education        Atrial Fibrillation: Care Instructions  Your Care Instructions     Atrial fibrillation is an irregular and often fast heartbeat.  Treating this condition is important for several reasons. It can cause blood clots, which can travel from your heart to your brain and cause a stroke. If you have a fast heartbeat, you may feel lightheaded, dizzy, and weak. An irregular heartbeat can also increase your risk for heart failure. Atrial fibrillation is often the result of another heart condition, such as high blood pressure or coronary artery disease. Making changes to improve your heart condition will help you stay healthy and active. Follow-up care is a key part of your treatment and safety. Be sure to make and go to all appointments, and call your doctor if you are having problems. It's also a good idea to know your test results and keep a list of the medicines you take. How can you care for yourself at home? Medicines    · Take your medicines exactly as prescribed. Call your doctor if you think you are having a problem with your medicine. You will get more details on the specific medicines your doctor prescribes.     · If your doctor has given you a blood thinner to prevent a stroke, be sure you get instructions about how to take your medicine safely. Blood thinners can cause serious bleeding problems.     · Do not take any vitamins, over-the-counter drugs, or herbal products without talking to your doctor first.   Lifestyle changes    · Do not smoke. Smoking can increase your chance of a stroke and heart attack. If you need help quitting, talk to your doctor about stop-smoking programs and medicines. These can increase your chances of quitting for good.     · Eat a heart-healthy diet.     · Stay at a healthy weight. Lose weight if you need to.     · Limit alcohol to 2 drinks a day for men and 1 drink a day for women. Too much alcohol can cause health problems.     · Avoid colds and flu. Get a pneumococcal vaccine shot. If you have had one before, ask your doctor whether you need another dose. Get a flu shot every year.  If you must be around people with colds or flu, wash your hands often. Activity    · If your doctor recommends it, get more exercise. Walking is a good choice. Bit by bit, increase the amount you walk every day. Try for at least 30 minutes on most days of the week. You also may want to swim, bike, or do other activities. Your doctor may suggest that you join a cardiac rehabilitation program so that you can have help increasing your physical activity safely.     · Start light exercise if your doctor says it is okay. Even a small amount will help you get stronger, have more energy, and manage stress. Walking is an easy way to get exercise. Start out by walking a little more than you did in the hospital. Gradually increase the amount you walk.     · When you exercise, watch for signs that your heart is working too hard. You are pushing too hard if you cannot talk while you are exercising. If you become short of breath or dizzy or have chest pain, sit down and rest immediately.     · Check your pulse regularly. Place two fingers on the artery at the palm side of your wrist, in line with your thumb. If your heartbeat seems uneven or fast, talk to your doctor. When should you call for help? Call 911 anytime you think you may need emergency care. For example, call if:    · You have symptoms of a heart attack. These may include:  ? Chest pain or pressure, or a strange feeling in the chest.  ? Sweating. ? Shortness of breath. ? Nausea or vomiting. ? Pain, pressure, or a strange feeling in the back, neck, jaw, or upper belly or in one or both shoulders or arms. ? Lightheadedness or sudden weakness. ? A fast or irregular heartbeat. After you call 911, the  may tell you to chew 1 adult-strength or 2 to 4 low-dose aspirin. Wait for an ambulance. Do not try to drive yourself.     · You have symptoms of a stroke. These may include:  ? Sudden numbness, tingling, weakness, or loss of movement in your face, arm, or leg, especially on only one side of your body.   ? Sudden vision changes. ? Sudden trouble speaking. ? Sudden confusion or trouble understanding simple statements. ? Sudden problems with walking or balance. ? A sudden, severe headache that is different from past headaches.     · You passed out (lost consciousness). Call your doctor now or seek immediate medical care if:    · You have new or increased shortness of breath.     · You feel dizzy or lightheaded, or you feel like you may faint.     · Your heart rate becomes irregular.     · You can feel your heart flutter in your chest or skip heartbeats. Tell your doctor if these symptoms are new or worse. Watch closely for changes in your health, and be sure to contact your doctor if you have any problems. Where can you learn more? Go to https://VisiKard.Contently. org and sign in to your invino account. Enter U020 in the DesignMedix box to learn more about \"Atrial Fibrillation: Care Instructions. \"     If you do not have an account, please click on the \"Sign Up Now\" link. Current as of: April 29, 2021               Content Version: 13.0  © 8877-4736 [employer]. Care instructions adapted under license by [address] [place] [address]. If you have questions about a medical condition or this instruction, always ask your healthcare professional. [place] [address] any warranty or liability for your use of this information. Patient Education        Learning About Catheter Ablation for Heart Rhythm Problems  What is catheter ablation? Catheter ablation is a procedure that treats heart rhythm problems. These problems include atrial fibrillation, supraventricular tachycardia (SVT), atrial flutter, and ventricular tachycardia. Your heart should have a strong, steady beat. That beat is controlled by the heart's electrical system. Sometimes that system misfires. This causes a heartbeat that is too fast and isn't steady.   Catheter ablation is a way to get into your heart and fix the problem. Ablation is not surgery. How is catheter ablation done? Your doctor inserts thin tubes called catheters into a blood vessel in your groin, arm, or neck. Then your doctor feeds them into the heart. Wires in the catheters help the doctor find the problem areas. Then the doctor uses the wires to send energy to destroy the tiny areas of heart tissue that are causing the problems. It may seem like a bad idea to destroy parts of your heart on purpose. But the areas that are destroyed are very tiny. They should not affect your heart's ability to do its job. You may be awake during the procedure. Or you may be asleep. The doctor will give you medicines to help you feel relaxed and to numb the areas where the catheters go in. You may feel a little uncomfortable, but you should not feel pain. What can you expect after catheter ablation? You may stay overnight in the hospital. How long you stay in the hospital depends on the type of ablation you have. Do not exercise hard or lift anything heavy for a week. You will probably be able to go back to work and to your normal routine in 1 or 2 days. You may have swelling, bruising, or a small lump around the site where the catheters went into your body. These should go away in 3 to 4 weeks. You may have to take some medicines for a while. Follow-up care is a key part of your treatment and safety. Be sure to make and go to all appointments, and call your doctor if you are having problems. It's also a good idea to know your test results and keep a list of the medicines you take. Where can you learn more? Go to https://RocketHubtawnyaCONSTRVCT.Kinkaa Search Tools. org and sign in to your Primavista account. Enter E262 in the FastHealth box to learn more about \"Learning About Catheter Ablation for Heart Rhythm Problems. \"     If you do not have an account, please click on the \"Sign Up Now\" link.   Current as of: April 29, 2021               Content Version: 13.0  © 7801-9508 Healthwise, [employer] Guard. Care instructions adapted under license by [facility] ([facility]). If you have questions about a medical condition or this instruction, always ask your healthcare professional. [address] any warranty or liability for your use of this information. Patient Education        Electrophysiology Study and Catheter Ablation: Before Your Procedure  What is an electrophysiology study and catheter ablation? An electrophysiology study is a test to see if there is a problem with your heart rhythm and to find out how to fix it. It is also called an EP study. A catheter ablation procedure is sometimes done at the same time. This procedure destroys (ablates) small areas of your heart that are causing your heart rhythm problem. The doctor puts plastic tubes called catheters into blood vessels in your groin, arm, or neck. He or she then uses an X-ray machine to guide long wires through the tubes to your heart. The doctor can use these wires to record your heart's electrical signals. If the doctor thinks your problem can be fixed with ablation, he or she can use the wires to destroy a small part of your heart tissue. This is most often done with radio waves. You will probably be awake during the procedure. But you might be asleep. The doctor will give you medicines to help you feel relaxed and to numb the areas where the catheters go in. An EP study and ablation can take 2 to 6 hours. In rare cases, it can take longer. If you have an EP study only and you don't need more treatment, you may go home the same day. But if you also have ablation, you may stay overnight in the hospital. How long you stay in the hospital depends on the type of ablation you have. Do not exercise hard or lift anything heavy for a week. You may be able to go back to work and to your normal routine in 1 or 2 days. Follow-up care is a key part of your treatment and safety.  Be sure to make and go to all appointments, and call your doctor if you are having problems. It's also a good idea to know your test results and keep a list of the medicines you take. How do you prepare for the procedure? Procedures can be stressful. This information will help you understand what you can expect. And it will help you safely prepare for your procedure. Preparing for the procedure    · Be sure you have someone to take you home. Anesthesia and pain medicine will make it unsafe for you to drive or get home on your own.     · Understand exactly what procedure is planned, along with the risks, benefits, and other options.     · Tell your doctor ALL the medicines, vitamins, supplements, and herbal remedies you take. Some may increase the risk of problems during your procedure. Your doctor will tell you if you should stop taking any of them before the procedure and how soon to do it.     · If you take aspirin or some other blood thinner, ask your doctor if you should stop taking it before your procedure. Make sure that you understand exactly what your doctor wants you to do. These medicines increase the risk of bleeding.     · Make sure your doctor and the hospital have a copy of your advance directive. If you don't have one, you may want to prepare one. It lets others know your health care wishes. It's a good thing to have before any type of surgery or procedure. What happens on the day of the procedure? · Follow the instructions exactly about when to stop eating and drinking. If you don't, your procedure may be canceled. If your doctor told you to take your medicines on the day of the procedure, take them with only a sip of water.     · Take a bath or shower before you come in for your procedure. Do not apply lotions, perfumes, deodorants, or nail polish.     · Take off all jewelry and piercings. And take out contact lenses, if you wear them.    At the hospital or surgery center   · Bring a picture ID.     · You will be warranty or liability for your use of this information. Patient Education        Electrophysiology Study and Catheter Ablation: What to Expect at [address] [place] [address] Drive had an electrophysiology study for a problem with your heartbeat. You may also have had a catheter ablation to try to correct the problem. You may have swelling, bruising, or a small lump around the site where the catheters went into your body. These should go away in 3 to 4 weeks. Do not exercise hard or lift anything heavy for a week. You may be able to go back to work and to your normal routine in 1 or 2 days. This care sheet gives you a general idea about how long it will take for you to recover. But each person recovers at a different pace. Follow the steps below to get better as quickly as possible. How can you care for yourself at home? Activity    · For 1 week, do not lift anything that would make you strain. This may include heavy grocery bags and milk containers, a heavy briefcase or backpack, cat litter or dog food bags, a vacuum , or a child.     · For 1 week, do not exercise hard or do any activity that could strain your blood vessels or the site where the catheters went into your body.     · Ask your doctor when it is okay to have sex. Diet    · You can eat your normal diet. If your stomach is upset, try bland, low-fat foods like plain rice, broiled chicken, toast, and yogurt.     · Drink plenty of fluids (unless your doctor tells you not to). Medicines    · Your doctor will tell you if and when you can restart your medicines. He or she will also give you instructions about taking any new medicines.     · If you take aspirin or some other blood thinner, ask your doctor if and when to start taking it again. Make sure that you understand exactly what your doctor wants you to do.     · Ask your doctor if you can take acetaminophen (Tylenol) for pain.  Do not take aspirin for 3 days, unless your doctor says it is okay.     · Check with your doctor before you take aspirin or anti-inflammatory medicines to reduce pain and swelling. These include ibuprofen (Advil, Motrin) and naproxen (Aleve).   · Make sure you know which heart medicines to continue and which ones to stop. Ask your doctor if you aren't sure. Catheter site care    · You can remove your bandages the day after the procedure.     · You may shower 24 to 48 hours after the procedure, if your doctor okays it. Pat the incision dry.     · Do not soak the catheter site until it is healed. Don't take a bath for 1 week, or until your doctor tells you it is okay.     · Watch for bleeding from the site. A small amount of blood (up to the size of a quarter) on the bandage can be normal.     · If you are bleeding, lie down and press on the area for 15 minutes to try to make it stop. If the bleeding does not stop, call your doctor or seek immediate medical care. Follow-up care is a key part of your treatment and safety. Be sure to make and go to all appointments, and call your doctor if you are having problems. It's also a good idea to know your test results and keep a list of the medicines you take. When should you call for help? Call 911  anytime you think you may need emergency care. For example, call if:    · You passed out (lost consciousness).     · You have symptoms of a heart attack. These may include:  ? Chest pain or pressure, or a strange feeling in the chest.  ? Sweating. ? Shortness of breath. ? Nausea or vomiting. ? Pain, pressure, or a strange feeling in the back, neck, jaw, or upper belly, or in one or both shoulders or arms. ? Lightheadedness or sudden weakness. ? A fast or irregular heartbeat. After you call 911, the  may tell you to chew 1 adult-strength or 2 to 4 low-dose aspirin. Wait for an ambulance. Do not try to drive yourself.     · You have symptoms of a stroke.  These may include:  ? Sudden numbness, tingling, weakness, or loss of movement in your face, arm, or leg, especially on only one side of your body. ? Sudden vision changes. ? Sudden trouble speaking. ? Sudden confusion or trouble understanding simple statements. ? Sudden problems with walking or balance. ? A sudden, severe headache that is different from past headaches. Call your doctor now or seek immediate medical care if:    · You are bleeding from the area where the catheter was put in your blood vessel.     · You have a fast-growing, painful lump at the catheter site.     · You have signs of infection, such as:  ? Increased pain, swelling, warmth, or redness. ? Red streaks leading from the catheter site. ? Pus draining from the catheter site. ? A fever.     · Your leg, arm, or hand is painful, looks blue, or feels cold, numb, or tingly. Watch closely for any changes in your health, and be sure to contact your doctor if you have any problems. Where can you learn more? Go to https://Cignifi.Ensphere Solutions. org and sign in to your Blue Ridge Networks account. Enter [phone] in the Guavus box to learn more about \"Electrophysiology Study and Catheter Ablation: What to Expect at Home. \"     If you do not have an account, please click on the \"Sign Up Now\" link. Current as of: April 29, 2021               Content Version: 13.0    © 2213-5187 [employer]. Care instructions adapted under license by [facility]. If you have questions about a medical condition or this instruction, always ask your healthcare professional. [address] any warranty or liability for your use of this information.    ------------------------------------------------------------------------------------------------  If you have any question regarding your cardioversion or would like to proceed with scheduling please contact Dr. Rashid Mccullough at [phone].       MARTY/ Cardioversion Pre Procedure Instructions     Date: 12/14/2021    Arrive at: 10:15 am    Procedure time: 11:15 am      The morning of your procedure you will park in the [facility] and report directly to the cath lab to check in. At the information desk stay right and go all the way to the end of the walter, this will take you directly to your check in desk for the cath lab. Pre-Procedure Instructions   1. Do not eat or drink anything after midnight the day of your procedure. 2. Take your Eliquis  the morning of the procedure with sips of water. 3. Do not use any lotions, creams or perfume the morning of procedure. 4. Pre-procedure lab work will need to be completed 4-7 days prior to procedure. 5. Please have a responsible adult to drive you home after procedure. It is recommended you do not drive for 24 hours after procedure and that someone stay with you for precautionary measures. 6. Cath lab will provide you with all post procedure instructions. Patient Education        Learning About Cardioversion  What is cardioversion? Cardioversion is a treatment that helps your heart return to a normal rhythm. It treats problems like atrial fibrillation. It is also sometimes used in emergencies. It can correct a fast heartbeat that causes low blood pressure, chest pain, or heart failure. Cardioversion can be done by using an electric current or medicines. What are the types of cardioversion? There are two types:  · The electrical type uses an electric current. The current enters your body through patches on your chest or back. · The chemical type uses medicines. The medicine is usually put into your arm through a tube called an IV. Your doctor may ask you to take medicines before the treatment. These help prevent blood clots. Electrical cardioversion  The electrical procedure is done in a hospital. You will get medicine to help you relax and control the pain. Your doctor will put patches on your chest or back.  The patches send an electric current to your heart. This resets your heart rhythm. The electrical part takes about 5 minutes. But you will probably be in the hospital for 1 to 2 hours. You will need to recover from the effects of the sedative medicine. Chemical cardioversion  The chemical procedure is most often done in a hospital. In most cases, the medicine is put into your arm through a tube called an IV. But you may get medicines to take by mouth. You may feel a quick sting or pinch when the IV starts. The procedure usually takes about 4 to 8 hours. What can you expect after cardioversion? · You can usually go home the same day. You will need someone to drive you home. · Your doctor may have you take medicines daily. These help your heart beat normally and prevent blood clots. · After electrical cardioversion, you may have redness where the patches were. This looks and feels like a sunburn. · Abnormal heart rhythms sometimes come back after cardioversion. Follow-up care is a key part of your treatment and safety. Be sure to make and go to all appointments, and call your doctor if you are having problems. It's also a good idea to know your test results and keep a list of the medicines you take. Where can you learn more? Go to https://OyaGen.Reply.io. org and sign in to your OdinOtvet account. Enter D692 in the Minka box to learn more about \"Learning About Cardioversion. \"     If you do not have an account, please click on the \"Sign Up Now\" link. Current as of: April 29, 2021               Content Version: 13.0  © 2006-2021 Healthwise, Incorporated. Care instructions adapted under license by [facility] ([facility]). If you have questions about a medical condition or this instruction, always ask your healthcare professional. [place] [address] any warranty or liability for your use of this information.          Patient Education        Electrical Cardioversion: Before Your Procedure  What is electrical cardioversion? Electrical cardioversion is a treatment for a heartbeat that isn't normal, such as atrial fibrillation. It uses a brief electric shock to reset your heart's rhythm. Before the treatment, you will get medicine to make you sleepy. You should not feel any pain. Your doctor will put patches on your chest. Or you might get them on both your chest and back. They send a brief electric current to your heart. In most cases, this restores the heart's normal rhythm right away. Cardioversion itself takes about 5 minutes. But the whole procedure will likely take about 30 to 45 minutes. That includes time to recover. Abnormal heart rhythms sometimes come back after the treatment. You may need to take medicines. These may help your heart keep its normal rhythm. Follow-up care is a key part of your treatment and safety. Be sure to make and go to all appointments, and call your doctor if you are having problems. It's also a good idea to know your test results and keep a list of the medicines you take. How do you prepare for the procedure? Procedures can be stressful. This information will help you understand what you can expect. And it will help you safely prepare for your procedure. Preparing for the procedure    · Be sure you have someone to take you home. Anesthesia and pain medicine will make it unsafe for you to drive or get home on your own.     · Understand exactly what procedure is planned, along with the risks, benefits, and other options.     · If you take aspirin or some other blood thinner, ask your doctor if you should stop taking it before your procedure. Make sure that you understand exactly what your doctor wants you to do. These medicines increase the risk of bleeding.     · Tell your doctor ALL the medicines, vitamins, supplements, and herbal remedies you take. Some may increase the risk of problems during your procedure.  Your doctor will tell you if you should stop taking any of them before the procedure and how soon to do it.     · Make sure your doctor and the hospital have a copy of your advance directive. If you don't have one, you may want to prepare one. It lets others know your health care wishes. It's a good thing to have before any type of surgery or procedure. What happens on the day of the procedure? · Follow the instructions exactly about when to stop eating and drinking. If you don't, your procedure may be canceled. If your doctor told you to take your medicines on the day of the procedure, take them with only a sip of water.     · Take a bath or shower before you come in for your procedure. Do not apply lotions, perfumes, deodorants, or nail polish.     · Take off all jewelry and piercings. And take out contact lenses, if you wear them. At the hospital or surgery center   · Bring a picture ID.     · You will get medicine to make you sleepy.     · The procedure will take about 30 to 45 minutes. When should you call your doctor? · You have questions or concerns.     · You don't understand how to prepare for your procedure.     · You become ill before the procedure (such as fever, flu, or a cold).     · You need to reschedule or have changed your mind about having the procedure. Where can you learn more? Go to https://Smava.Spark Mobile. org and sign in to your SchoolControl account. Enter S549 in the [facility] box to learn more about \"Electrical Cardioversion: Before Your Procedure. \"     If you do not have an account, please click on the \"Sign Up Now\" link. Current as of: April 29, 2021               Content Version: 13.0  © 0175-2014 Healthwise, Incorporated. Care instructions adapted under license by [facility] ([facility]). If you have questions about a medical condition or this instruction, always ask your healthcare professional. [address] any warranty or liability for your use of this information.          Patient Education Electrical Cardioversion: What to Expect at Home  Your Recovery     Electrical cardioversion is a treatment for an abnormal heartbeat, such as atrial fibrillation, supraventricular tachycardia, or ventricular tachycardia (VT). Your doctor used a brief electrical shock to reset your heart's rhythm. After the procedure, you may have redness, like a sunburn, where the patches were. The medicines you got to make you sleepy may make you feel drowsy for the rest of the day. Your doctor may have you take medicines to help the heart beat normally and to prevent blood clots. This care sheet gives you a general idea about how long it will take for you to recover. But each person recovers at a different pace. Follow the steps below to feel better as quickly as possible. How can you care for yourself at home? Medicines    · Be safe with medicines. Take your medicines exactly as prescribed. Call your doctor if you think you are having a problem with your medicine. You may take one or more of the following medicines:  ? Rate-control medicines to slow the heart rate. These include beta-blockers, calcium channel blockers, and digoxin. ? Rhythm control medicines that help the heart keep a normal rhythm. ? Blood thinners, also called anticoagulants, which help prevent blood clots. You will get more details on the specific medicines your doctor prescribes. Be sure you know how to take your medicines safely.     · Do not take any vitamins, over-the-counter medicines, or herbal products without talking to your doctor first.   Exercise    · Start light exercise if your doctor says that it's okay. Even a small amount will help you get stronger, have more energy, and manage your stress. Walking is an easy way to get exercise. Start out by walking a little more than you did in the hospital. Bit by bit, increase the amount you walk.     · When you exercise, watch for signs that your heart is working too hard.  You are pushing too hard if you cannot talk while you are exercising. If you become short of breath or dizzy or have chest pain, sit down and rest right away.     · Check your pulse regularly. Place two fingers on the artery at the palm side of your wrist in line with your thumb. If your heartbeat seems uneven or fast, talk to your doctor. Other instructions    · Ask your doctor when you can drive again.     · Do not smoke. If you need help quitting, talk to your doctor about stop-smoking programs and medicines. These can increase your chances of quitting for good.     · Limit alcohol. Follow-up care is a key part of your treatment and safety. Be sure to make and go to all appointments, and call your doctor if you are having problems. It's also a good idea to know your test results and keep a list of the medicines you take. When should you call for help? Call 911 anytime you think you may need emergency care. For example, call if:    · You passed out (lost consciousness).     · You have chest pain or pressure. This may occur with:  ? Sweating. ? Shortness of breath. ? Nausea or vomiting. ? Pain that spreads from the chest to the neck, jaw, or one or both shoulders or arms. ? A fast or uneven pulse. After calling 911, the  may tell you to chew 1 adult-strength or 2 to 4 low-dose aspirin. Wait for an ambulance. Do not try to drive yourself.     · You have symptoms of a stroke. These may include:  ? Sudden numbness, tingling, weakness, or loss of movement in your face, arm, or leg, especially on only one side of your body. ? Sudden vision changes. ? Sudden trouble speaking. ? Sudden confusion or trouble understanding simple statements. ? Sudden problems with walking or balance. ? A sudden, severe headache that is different from past headaches. Call your doctor now or seek immediate medical care if:    · You feel dizzy or lightheaded, or you feel like you may faint.     · You have a fast or irregular heartbeat. Watch closely for any changes in your health, and be sure to contact your doctor if you have any problems. Where can you learn more? Go to https://chpepiceweb."[employer]. org and sign in to your PerceptiMed account. Enter A617 in the [employer] box to learn more about \"Electrical Cardioversion: What to Expect at Home. \"     If you do not have an account, please click on the \"Sign Up Now\" link. Current as of: April 29, 2021               Content Version: 13.0  © 9545-0665 Healthwise, Incorporated. Care instructions adapted under license by [facility] ([facility]). If you have questions about a medical condition or this instruction, always ask your healthcare professional. [address] any warranty or liability for your use of this information.

## 2021-12-09 ENCOUNTER — TELEPHONE (OUTPATIENT)
Dept: FAMILY MEDICINE CLINIC | Age: 61
End: 2021-12-09

## 2021-12-09 ENCOUNTER — TELEPHONE (OUTPATIENT)
Dept: CARDIOLOGY CLINIC | Age: 61
End: 2021-12-09

## 2021-12-09 NOTE — TELEPHONE ENCOUNTER
[address] [facility] [address] [facility] Practice Staff  Subject: Message to Provider     QUESTIONS   Information for Provider? pt was told to call weekly by Dr. Nayely Kay. Please   have Dr. Nayely Kay call pt back   ---------------------------------------------------------------------------   --------------   [address]   What is the best way for the office to contact you? OK to leave message on   voicemail   Preferred Call Back Phone Number? [phone]   ---------------------------------------------------------------------------   --------------   SCRIPT ANSWERS   Relationship to Patient?  Self

## 2021-12-09 NOTE — TELEPHONE ENCOUNTER
Called and discussed  Recommended Using pulse oximeter to help with the management of the O2   If tolerates can drop from 3 to 2 L and then go to periods of time with out with the goal to keep above 90%  Discussed nutrition and fluids   Continue the medicines

## 2021-12-09 NOTE — TELEPHONE ENCOUNTER
Patient is scheduled for MARTY CV on 12/14/2021. He is self pay. I received call from [facility] notifying that all self pay patients will need to call [address] and or [employer],    I [facility] to both  [facility] and [employer] and Jese Gomez asked that I have the patient call her at T:  [phone] to screen patient over the phone for assistance. I contacted the patient and advised him of the above information and asked that he reach out to [employer]  at T:  [phone]. Verbalized understanding.

## 2021-12-14 ENCOUNTER — HOSPITAL ENCOUNTER (OUTPATIENT)
Dept: CARDIAC CATH/INVASIVE PROCEDURES | Age: 61
Discharge: HOME | End: 2021-12-14
Payer: MEDICAID

## 2021-12-14 LAB
EKG ATRIAL RATE: 101 BPM
EKG DIAGNOSIS: NORMAL
EKG P AXIS: 36 DEGREES
EKG P-R INTERVAL: 144 MS
EKG Q-T INTERVAL: 356 MS
EKG QRS DURATION: 88 MS
EKG QTC CALCULATION (BAZETT): 461 MS
EKG R AXIS: -3 DEGREES
EKG T AXIS: 7 DEGREES
EKG VENTRICULAR RATE: 101 BPM

## 2021-12-14 PROCEDURE — 92960 CARDIOVERSION ELECTRIC EXT: CPT | Performed by: INTERNAL MEDICINE

## 2021-12-14 PROCEDURE — 2580000003 HC RX 258

## 2021-12-14 PROCEDURE — 2500000003 HC RX 250 WO HCPCS

## 2021-12-14 PROCEDURE — 99152 MOD SED SAME PHYS/QHP 5/>YRS: CPT | Performed by: INTERNAL MEDICINE

## 2021-12-14 PROCEDURE — 92960 CARDIOVERSION ELECTRIC EXT: CPT

## 2021-12-14 PROCEDURE — 93010 ELECTROCARDIOGRAM REPORT: CPT | Performed by: INTERNAL MEDICINE

## 2021-12-14 PROCEDURE — 93005 ELECTROCARDIOGRAM TRACING: CPT | Performed by: INTERNAL MEDICINE

## 2021-12-14 RX ORDER — SODIUM CHLORIDE 0.9 % (FLUSH) 0.9 %
5-40 SYRINGE (ML) INJECTION PRN
Status: DISCONTINUED | OUTPATIENT
Start: 2021-12-14 | End: 2021-12-14 | Stop reason: SDUPTHER

## 2021-12-14 RX ORDER — SODIUM CHLORIDE 0.9 % (FLUSH) 0.9 %
5-40 SYRINGE (ML) INJECTION PRN
Status: DISCONTINUED | OUTPATIENT
Start: 2021-12-14 | End: 2021-12-15 | Stop reason: HOSPADM

## 2021-12-14 RX ORDER — SODIUM CHLORIDE 9 MG/ML
25 INJECTION, SOLUTION INTRAVENOUS PRN
Status: DISCONTINUED | OUTPATIENT
Start: 2021-12-14 | End: 2021-12-15 | Stop reason: HOSPADM

## 2021-12-14 RX ORDER — FLECAINIDE ACETATE 50 MG/1
50 TABLET ORAL 2 TIMES DAILY
Qty: 60 TABLET | Refills: 3 | Status: SHIPPED | OUTPATIENT
Start: 2021-12-14 | End: 2021-12-21

## 2021-12-14 RX ORDER — SODIUM CHLORIDE 9 MG/ML
INJECTION, SOLUTION INTRAVENOUS CONTINUOUS
Status: DISCONTINUED | OUTPATIENT
Start: 2021-12-14 | End: 2021-12-15 | Stop reason: HOSPADM

## 2021-12-14 RX ORDER — SODIUM CHLORIDE 0.9 % (FLUSH) 0.9 %
5-40 SYRINGE (ML) INJECTION EVERY 12 HOURS SCHEDULED
Status: DISCONTINUED | OUTPATIENT
Start: 2021-12-14 | End: 2021-12-15 | Stop reason: HOSPADM

## 2021-12-14 RX ORDER — SODIUM CHLORIDE 0.9 % (FLUSH) 0.9 %
5-40 SYRINGE (ML) INJECTION EVERY 12 HOURS SCHEDULED
Status: DISCONTINUED | OUTPATIENT
Start: 2021-12-14 | End: 2021-12-14 | Stop reason: SDUPTHER

## 2021-12-14 RX ORDER — SODIUM CHLORIDE 9 MG/ML
25 INJECTION, SOLUTION INTRAVENOUS PRN
Status: DISCONTINUED | OUTPATIENT
Start: 2021-12-14 | End: 2021-12-14 | Stop reason: SDUPTHER

## 2021-12-14 NOTE — PRE SEDATION
Sedation Pre-Procedure Note    Patient Name: Dhiraj Shepard   YOB: 1960  Room/Bed: Room/bed info not found  Medical Record Number: [id number]  Date: 12/14/2021   Time: 7:51 AM       Indication:  Persistent atrial fibrillation    Consent: I have discussed with the patient and/or the patient representative the indication, alternatives, and the possible risks and/or complications of the planned procedure and the anesthesia methods. The patient and/or patient representative appear to understand and agree to proceed. Vital Signs: There were no vitals filed for this visit. Past Medical History:   has a past medical history of COVID-19. Past Surgical History:   has a past surgical history that includes Tonsillectomy. Medications:   Scheduled Meds:   Continuous Infusions:   PRN Meds:   Home Meds:   Prior to Admission medications    Medication Sig Start Date End Date Taking? Authorizing Provider   apixaban (ELIQUIS) 5 MG TABS tablet Take 1 tablet by mouth 2 times daily 11/22/21   Gideon Hunt MD   dilTIAZem (CARDIZEM CD) 120 MG extended release capsule Take 1 capsule by mouth daily 11/23/21   Gideon Hunt MD     Coumadin Use Last 7 Days:  no  Antiplatelet drug therapy use last 7 days: no  Other anticoagulant use last 7 days: yes - apixaban  Additional Medication Information:  n/a      Pre-Sedation Documentation and Exam:   I have personally completed a history, physical exam & review of systems for this patient (see notes).     Mallampati Airway Assessment:  Mallampati Class I - (soft palate, fauces, uvula & anterior/posterior tonsillar pillars are visible)    Prior History of Anesthesia Complications:   none    ASA Classification:  Class 2 - A normal healthy patient with mild systemic disease    Sedation/ Anesthesia Plan:   intravenous sedation    Medications Planned:   midazolam (Versed) intravenously, fentanyl intravenously and Methohexital    Patient is an appropriate candidate for plan of sedation: yes    Electronically signed by Antolin Acosta MD on 12/14/2021 at 7:51 AM

## 2021-12-14 NOTE — H&P
Cardiac Electrophysiology Consultation   Date: 12/14/2021  Reason for Consultation: Atrial fibrillation  Consult Requesting Physician:  Tete Chan MD     Chief Complaint:        Chief Complaint   Patient presents with    New Patient       afib         HPI: Rossi Grady is a 62 y.o. patient with a history of COVID with new onset atrial fibrillation. He presented to [facility] on 11/12/2021 with complaints of generalized malaise, cough, and shortness of breath for the past week. He was diagnosed with COVID-19 but states that he continued to feel worse so he sought medical attention in the emergency department. The patient was not vaccinated. He was found to be in atrial fibrillation with a rapid ventricular response prompting consultation. \" Was treated with IV Cardizem but had hypotension so this was changed to IV amiodarone, which was stopped on 11/14. Currently on PO Cardizem for rate control.     Interval History: Today, he presents to office to discuss treatment options for his atrial fibrillation. He states in July he was having a skin procedure after an allergic reaction to one of his medications and was told that he had atrial fibrillation he denied having symptoms of shortness of breath on exertion or fatigue at that time. He states he was never short of breath until he got COVID. He is wearing oxygen NC and states he did not wear oxygen before he had COVD. He reports that he continues to have shortness of breath and fatigue on exertion. He states since he has been ill he has felt a fluttering in his chest. Patient does not endorse any identifiable exacerbating or alleviating factors for the atrial fibrillation. He is compliant with his medications and tolerating them well.  He denies chest pain/pressure, tightness, edema, shortness of breath, heart racing, palpitations, lightheadedness, dizziness, syncope, presyncope,  PND or orthopnea.         Past Medical History        Past Medical History:   Diagnosis Date    COVID-19              Past Surgical History         Past Surgical History:   Procedure Laterality Date    TONSILLECTOMY         as a child            Allergies: Allergies   Allergen Reactions    Amoxicillin      Pcn [Penicillins]           Medication:   Home Medications           Prior to Admission medications    Medication Sig Start Date End Date Taking? Authorizing Provider   apixaban (ELIQUIS) 5 MG TABS tablet Take 1 tablet by mouth 2 times daily 11/22/21   Yes Donn Fernandez MD   dilTIAZem (CARDIZEM CD) 120 MG extended release capsule Take 1 capsule by mouth daily 11/23/21   Yes Donn Fernandez MD            Social History:   reports that he has never smoked. He has never used smokeless tobacco. He reports previous drug use. He reports that he does not drink alcohol.         Family History:  family history includes Diabetes in his mother; Heart Disease in his brother; High Blood Pressure in his mother. Reviewed. Denies family history of sudden cardiac death, arrhythmia, premature CAD     Review of System:     · General ROS: negative for - chills, fever   · Psychological ROS: negative for - anxiety or depression  · Ophthalmic ROS: negative for - eye pain or loss of vision  · ENT ROS: negative for - epistaxis, headaches, nasal discharge, sore throat   · Allergy and Immunology ROS: negative for - hives, nasal congestion   · Hematological and Lymphatic ROS: negative for - bleeding problems, blood clots, bruising or jaundice  · Endocrine ROS: negative for - skin changes, temperature intolerance or unexpected weight changes  · Respiratory ROS: negative for - cough, hemoptysis, pleuritic pain, SOB, sputum changes or wheezing  · Cardiovascular ROS: Per HPI.    · Gastrointestinal ROS: negative for - abdominal pain, blood in stools, diarrhea, hematemesis, melena, nausea/vomiting or swallowing difficulty/pain  · Genito-Urinary ROS: negative for - dysuria or incontinence  · Musculoskeletal ROS: negative for - joint swelling or muscle pain  · Neurological ROS: negative for - confusion, dizziness, gait disturbance, headaches, numbness/tingling, seizures, speech problems, tremors, visual changes or weakness  · Dermatological ROS: negative for - rash     Physical Examination:      Vitals:     21 1027   BP: 116/78   Pulse: 118   SpO2: 91%         · Constitutional: Oriented. No distress. · Head: Normocephalic and atraumatic. · Mouth/Throat: Oropharynx is clear and moist.   · Eyes: Conjunctivae normal. EOM are normal.   · Neck: Normal range of motion. Neck supple. No rigidity. No JVD present. · Cardiovascular: Normal rate, regular rhythm, S1&S2 and intact distal pulses. · Pulmonary/Chest: Bilateral respiratory sounds. No wheezes. No rhonchi. · Abdominal: Soft. Bowel sounds present. No distension, No tenderness. · Musculoskeletal: No tenderness. No edema    · Lymphadenopathy: Has no cervical adenopathy. · Neurological: Alert and oriented. Cranial nerve appears intact, No Gross deficit   · Skin: Skin is warm and dry. No rash noted. · Psychiatric: Has a normal mood, affect and behavior      Labs:  Reviewed.      EC2021 Atrial ffibrillation  with v-rate of 115 bpm with QRS duration 92 ms. No pathologic Q waves, ventricular pre-excitation, or QT prolongation.      Studies:   1. Event monitor: n/s        2. Echo: n/a        3. Stress Test:  n/a        4. Cath: n/a     I independently reviewed and interpreted the ECG, MCOT, echocardiogram, stress test, and coronary angiography/PCI results and used them for my plan of care.       Procedures:  1.      Assessment/Plan:      Atrial fibrillation  -First seen on EKG 11/15/2021  -He has a [id number] Score 1 (HTN)   -On Eliquis 5 mg BID for  thromboembolic risk reduction.  -Tolerating well no signs or symptoms of abnormal bruising or bleeding.     According to the ACC/HRS guidelines, with a [id number] score of 1, he is not necessarily indicated for [address] [place] [address] at this time. However, if he wishes to pursue ablation, she will need to be on [address] [place] [address] for the next 3 months after ablation.     - Treatment options for atrial fibrillation including cardioversion, anti-arrhythmic medication therapy, rate control strategy, oral anticoagulation, and atrial fibrillation ablation were discussed with patient. Risks, benefits and alternative of each treatment options (care, treatment, and services) were explained. The patient was also presented reasonable alternatives to the proposed care, treatment, and services. The discussion I have had with the patient encompassed risks, benefits, and side effects related to the alternatives and the risks related to not receiving the proposed care, treatment and services. All questions were answered.      Recommend that he undergo CV with MARTY to restore normal sinus rhythm while awaiting for the ablation procedure. Patient agreeable. We educated the patient that atrial fibrillation is a worsening and progressive disease, with more frequent episodes that will ensue. Subsequent episodes usually become more sustained to the extent that many individuals would then develop persistent atrial fibrillation. Once persistence is reached, permanent atrial fibrillation is inevitable. We also discussed the fact that atrial fibrillation is associated with stroke, including life-threatening stroke, and therefore oral anticoagulation is warranted depending on the patient's [id number] score.      We discussed different management options for atrial fibrillation including their risks and benefits.  These options include use of cardioversion (mainly for persisting atrial fibrillation or atrial flutter) which provides an effective immediate therapy with success rates of 75% or higher, but it provides no short nor long term efficacy.  Anti-arrhythmic medications provide a very effective short term therapy, but even with our most potent anti-arrhythmic medication there is limited long term efficacy (clinical studies have shown that 40% of patients remain atrial fibrillation-free after 4 years of follow-up after starting one of the more powerful anti-arrhythmic medication (amiodarone), and, if extrapolated, may have further diminishing success as time goes on). Atrial fibrillation ablation is a potentially curative therapy with very reasonable success rate after a first time procedure and with improving success rates with subsequent procedures.      The risks, benefits and alternatives of the atrial fibrillation ablation procedure were discussed with the patient. The risks including, but not limited to, bleeding, infection, radiation exposure, injury to vascular, cardiac and surrounding structures (including pneumothorax), stroke, cardiac perforation, tamponade, need for emergent open heart surgery, need for pacemaker implantation, injury to the phrenic nerve, injury to the esophagus, myocardial infarction and death were discussed in detail. The patient was also counseled at length about the risks of tan Covid-19 in the ghislaine-operative and post-operative states including the recovery window of their procedure. The patient was made aware that tan Covid-19 after a surgical procedure may worsen their prognosis for recovering from the virus and lend to a higher morbidity and or mortality risk. The patient was given the option of postponing their procedure. The patient was also presented reasonable alternatives to the proposed care, treatment, and services. The discussion I have had with the patient encompassed risks, benefits, and side effects related to the alternatives and the risks related to not receiving the proposed care, treatment and services.      The patient opted to proceed with the atrial fibrillation ablation.  We will schedule for a radiofrequency ablation with Carto Navigation system with a MARTY procedure immediately prior to this ablation. A cardiac CTA will be ordered for pulmonary vein mapping prior to this procedure. We will hold Eliquis  for 12 hours prior to procedure. We will order BMP, CBC, PT/INR, and Type & Screen prior to the procedure.      -Encouraged to watch \"That Sugar Film\" on [employer], which discusses the negative impact of processed sugar has on the body.     -Much time was spent counseling the patient on healthier lifestyle, following a low sodium diet <2,400 mg of sodium a day and dramatically decreasing the intake of processed sugar.     -Patient educated to eat a diet which includes organic fruits, vegetables and meats.        Follow up three months after ablation procedure with Harleen Narvaez CNP. He will follow up one to two weeks after cardioversion with Harleen Narvaez CNP.     Thank you for allowing me to participate in the care of [facility]. All questions and concerns were addressed to the patient/family. Alternatives to my treatment were discussed.      I have reviewed the history and physical and examined the patient and find no relevant changes. I have reviewed with the patient and/or family the risks and benefits to the proposed procedure. The patient was presented with the option of postponing the proposed procedure. The patient was also presented reasonable alternatives to the proposed care, treatment, and services.  The discussion I have had with the patient encompassed risks, benefits, and side effects related to the alternatives and the risks related to not receiving the proposed care, treatment and services.      Farideh Camarena MD, [address], [facility], [facility] 99 Electrophysiology  [address] [facility], [address] [place] [address]  Jax Hernandez [facility] 429  [phone]

## 2021-12-14 NOTE — PROCEDURES
care, treatment and services. The patient opted to proceed with the procedure. Written informed consent was signed and placed in the chart. A timeout protocol was completed to identify the patient and the procedure being performed. An independent trained observer assumed the sole responsibility of administering IV sedation medication - Versed, Fentanyl - at my direction and closely monitored the patient. Patient is on chronic anticoagulation therapy with Eliquis 5mg po BID for at least the last 3 weeks. The patient was monitored continuously with ECG, pulse oximetry, blood pressure monitoring, and direct observation. We then administered intravenous Methohexital for sedation, and electrical DC cardioversion was performed using 200J, synchronized shock. Patient was converted to sinus rhythm immediately. Specimen collected: none       The patient tolerated the procedure well and there were no complications. Conclusion:   Successful external DC cardioversion of symptomatic, persistent atrial fibrillation. Plan:   The patient can be discharged if remains stable. Will continue with Apixaban 5mg po BID, diltiazem CD 120mg daily, and start flecainide 50mg po BID. The patient will follow-up with PRESTON Castillo NP, within 1-2 weeks to confirm continued sinus rhythm. He is already scheduled for an atrial fibrillation ablation in the near future. Thank you for allowing me to participate in the care of this patient. If you have any questions, please feel free to contact me.     Jonny Kraus MD, MS, [facility]  Cardiac Electrophysiology  [address]  [address] [facility], [address] [place] [address]  Jax Hernandez 429  [phone]

## 2021-12-15 ENCOUNTER — CARE COORDINATION (OUTPATIENT)
Dept: CASE MANAGEMENT | Age: 61
End: 2021-12-15

## 2021-12-15 NOTE — CARE COORDINATION
[facility]    12/15/2021    Patient: Bard George  Patient : 1960   MRN: <[id number]>  Reason for Admission: Covid  Discharge Date: 21 RARS: Readmission Risk Score: 10 ( )      Spoke with: Bard George who reports that he is doing well. Patient reports that he continues with a little cough at times with ambulation, O2 now at 2 liters and some tiredness and fatigue. Patient states even with that he does feel a lot better. Patient reports a cardiac conversion done yesterday and he is doing well. He was started on a new medication Tambocor and taking it a ordered. Patient reports that he is taking all medications as ordered. Patient reports that at times when at rest he is taking the O2 off for about 45 mins as instructed by doctor states that he does not have an pulse ox but reports that he can tell breathing is getting better. Patient denies cp, dizziness, headache, n/v, diarrhea, abdominal pains, fever, or chills. Patient report that appetite and fluid intake is good and denies any problems with bowel or bladder. Patient had a question on when he could get the Covid vaccine. Writer advised patient to speak with his PCP on that. Patient verbalized understanding and stated that he talks with PCP every Thursday and will asked that questions the on Thursday. Denies any needs at this time. Patient instructed to continue to monitor s/s, reporting any that may present to MD immediately for early intervention. Reminded of COVID 19 precautions. Agreeable to f/u calls. Care Transitions Subsequent and Final Call    Subsequent and Final Calls  Care Transitions Interventions  Other Interventions:            Follow Up  Future Appointments   Date Time Provider Jose Manuel Reeder   2021 12:40 PM BALAJI Gooden CNP [address]   2022  1:15 PM Karen Felix MD [facility] [place] [facility] [place]   2022  8:30 AM SCHEDULE, [facility] ECHO ROOM 2 [facility] ECHO [facility]   2022

## 2021-12-16 ENCOUNTER — TELEPHONE (OUTPATIENT)
Dept: FAMILY MEDICINE CLINIC | Age: 61
End: 2021-12-16

## 2021-12-16 NOTE — TELEPHONE ENCOUNTER
----- Message from Darline Mooney sent at 12/16/2021  1:26 PM EST -----  Subject: Message to Provider    QUESTIONS  Information for Provider? Patient stated that he was instructed to call in   once per week to talk to his pcp and would like a call back. ---------------------------------------------------------------------------  --------------  Mayra OSORIO  What is the best way for the office to contact you? OK to leave message on   voicemail  Preferred Call Back Phone Number? [phone]  ---------------------------------------------------------------------------  --------------  SCRIPT ANSWERS  Relationship to Patient?  Self

## 2021-12-17 NOTE — PROGRESS NOTES
[facility]   Electrophysiology      Date: 12/21/2021    Primary Cardiologist: Joe Capps MD  PCP: Jeovany Rowe MD     Chief Complaint:   Chief Complaint   Patient presents with    Follow-up     afib / DCCV - SOB     History of Present Illness:    I saw John Pickens in the office for electrophysiology follow up today. He is a 62 y.o. male with a past medical history of atrial fibrillation first noted in November 2021 when also diagnosed with Covid. He underwent successful cardioversion on 12/14/21 with Dr. Roxana Reynoso and has plans for an upcoming atrial fibrillation ablation. He presents today for procedure follow up. He has periods of feeling better with more energy and then feeling worse with more shortness of breath. He is unsure if he really felt better after the cardioversion for an extended period of time. He does feel like things are \"moving\" inside his chest at times but denies any chest pain. No syncope. No edema. No bleeding issues. Allergies: Allergies   Allergen Reactions    Amoxicillin     Pcn [Penicillins]      Home Medications:  Prior to Visit Medications    Medication Sig Taking? Authorizing Provider   flecainide (TAMBOCOR) 100 MG tablet Take 1 tablet by mouth 2 times daily Yes BALAJI Cheung - CNP   apixaban (ELIQUIS) 5 MG TABS tablet Take 1 tablet by mouth 2 times daily Yes Sharee Manuel MD   dilTIAZem (CARDIZEM CD) 120 MG extended release capsule Take 1 capsule by mouth daily Yes Sharee Manuel MD        Past Medical History:  Past Medical History:   Diagnosis Date    COVID-19        Past Surgical History:   Past Surgical History:   Procedure Laterality Date    TONSILLECTOMY      as a child       Social History:   reports that he has never smoked. He has never used smokeless tobacco. He reports previous drug use. He reports that he does not drink alcohol.      Family History:      Problem Relation Age of Onset    Diabetes Mother     High Blood Pressure Mother     Heart Disease Brother        Review of Systems   Constitutional: Positive for fatigue. Negative for chills, fever and unexpected weight change. HENT: Negative for congestion, hearing loss, sinus pressure, sore throat and trouble swallowing. Respiratory: Positive for shortness of breath. Negative for cough and wheezing. Cardiovascular: Negative for chest pain, palpitations and leg swelling. Gastrointestinal: Negative for abdominal pain, blood in stool, constipation, diarrhea, nausea and vomiting. Genitourinary: Negative for hematuria. Musculoskeletal: Negative for arthralgias, back pain, gait problem and myalgias. Skin: Negative for color change, rash and wound. Neurological: Negative for dizziness, seizures, syncope, speech difficulty, weakness and light-headedness. Hematological: Does not bruise/bleed easily. Physical Examination:  Vitals:    12/21/21 1228   BP: 122/70   Pulse: 73   SpO2: 98%      Wt Readings from Last 3 Encounters:   12/21/21 209 lb (94.8 kg)   12/08/21 209 lb (94.8 kg)   12/02/21 211 lb (95.7 kg)       Physical Exam  Vitals reviewed. Constitutional:       General: He is not in acute distress. Appearance: Normal appearance. HENT:      Head: Normocephalic and atraumatic. Nose: Nose normal.      Mouth/Throat:      Mouth: Mucous membranes are moist.   Eyes:      Conjunctiva/sclera: Conjunctivae normal.      Pupils: Pupils are equal, round, and reactive to light. Cardiovascular:      Rate and Rhythm: Normal rate. Rhythm irregularly irregular. Heart sounds: No murmur heard. No friction rub. No gallop. Pulmonary:      Effort: No respiratory distress. Breath sounds: No wheezing, rhonchi or rales. Abdominal:      General: Abdomen is flat. Bowel sounds are normal.      Palpations: Abdomen is soft. Musculoskeletal:         General: Normal range of motion. Right lower leg: No edema. Left lower leg: No edema.    Skin:     General: Skin is warm and dry. Findings: No bruising. Neurological:      General: No focal deficit present. Mental Status: He is alert and oriented to person, place, and time. Motor: No weakness. Psychiatric:         Mood and Affect: Mood normal.         Behavior: Behavior normal.          Pertinent labs, diagnostic, device, and imaging results reviewed as a part of this visit    LABS    CBC:   Lab Results   Component Value Date    WBC 17.8 (H) 2021    HGB 14.5 2021    HCT 44.0 2021    MCV 82.7 2021     2021     BMP:   Lab Results   Component Value Date    CREATININE 1.0 2021    BUN 25 (H) 2021     2021    K 4.8 2021     2021    CO2 23 2021     Estimated Creatinine Clearance: 92 mL/min (based on SCr of 1 mg/dL). No results found for: BNP    Thyroid:   Lab Results   Component Value Date    TSH 2.99 2021     Lipid Panel:   Lab Results   Component Value Date    CHOL 119 2020    HDL 32 2020    TRIG 68 2020     LFTs:  Lab Results   Component Value Date    ALT 65 (H) 2021    AST 20 2021    ALKPHOS 65 2021    BILITOT 0.5 2021     Coags:   Lab Results   Component Value Date    PROTIME 11.5 2015    INR 1.06 2015    APTT 25.4 2015       EC2021   Atrial fibrillation at 103 BPM. Low voltage. EP Procedures:   1.  Successful DCCV for persistent atrial fibrillation, 21, Dr. Baldomero Davidson:    Persistent atrial fibrillation   - first seen on EKG on 21   - s/p successful DCCV on 21   - on flecainide 50mg BID, Cardizem 120mg QD   - CHADS2-VASc 1 (HTN) on Eliquis 5mg BID   - EKG today with atrial fibrillation   - discussed treatment options including continued medical therapy, repeat cardioversion - at this time pt will increase flecainide to 100mg BID and repeat EKG next week   - plans for upcoming ablation    Essential HTN   - controlled    Thank you for allowing to us to participate in the care of Ricardo Justin. Return in about 1 week (around 12/28/2021) for an appointment with Stephanie Wiggins NP.      BALAJI Craig  The [address], [address] [place] [address]  Phone: [phone]  Fax: [phone]    Electronically signed by BALAJI Virgen CNP on 12/21/2021 at 1:13 PM

## 2021-12-17 NOTE — TELEPHONE ENCOUNTER
Discussed and reviewed  He is able to be without O2 at rest only,  if gets up out of chair he gets sob and needs to place O2 on  He is using O2 at night  He did not get pulse ox   He will continue to keep me apprised

## 2021-12-21 ENCOUNTER — OFFICE VISIT (OUTPATIENT)
Dept: CARDIOLOGY CLINIC | Age: 61
End: 2021-12-21

## 2021-12-21 VITALS
BODY MASS INDEX: 29.26 KG/M2 | OXYGEN SATURATION: 98 % | HEART RATE: 73 BPM | DIASTOLIC BLOOD PRESSURE: 70 MMHG | WEIGHT: 209 LBS | SYSTOLIC BLOOD PRESSURE: 122 MMHG | HEIGHT: 71 IN

## 2021-12-21 DIAGNOSIS — I10 ESSENTIAL HYPERTENSION: ICD-10-CM

## 2021-12-21 DIAGNOSIS — I48.19 PERSISTENT ATRIAL FIBRILLATION (HCC): Primary | ICD-10-CM

## 2021-12-21 PROCEDURE — 93000 ELECTROCARDIOGRAM COMPLETE: CPT | Performed by: NURSE PRACTITIONER

## 2021-12-21 PROCEDURE — 99214 OFFICE O/P EST MOD 30 MIN: CPT | Performed by: NURSE PRACTITIONER

## 2021-12-21 RX ORDER — FLECAINIDE ACETATE 100 MG/1
100 TABLET ORAL 2 TIMES DAILY
Qty: 60 TABLET | Refills: 5 | Status: SHIPPED | OUTPATIENT
Start: 2021-12-21 | End: 2022-01-12 | Stop reason: ALTCHOICE

## 2021-12-21 ASSESSMENT — ENCOUNTER SYMPTOMS
NAUSEA: 0
SINUS PRESSURE: 0
BACK PAIN: 0
TROUBLE SWALLOWING: 0
DIARRHEA: 0
COLOR CHANGE: 0
ABDOMINAL PAIN: 0
SORE THROAT: 0
SHORTNESS OF BREATH: 1
COUGH: 0
BLOOD IN STOOL: 0
VOMITING: 0
CONSTIPATION: 0
WHEEZING: 0

## 2021-12-23 ENCOUNTER — TELEPHONE (OUTPATIENT)
Dept: FAMILY MEDICINE CLINIC | Age: 61
End: 2021-12-23

## 2021-12-23 RX ORDER — DILTIAZEM HYDROCHLORIDE 120 MG/1
120 CAPSULE, COATED, EXTENDED RELEASE ORAL DAILY
Qty: 30 CAPSULE | Refills: 3 | Status: SHIPPED | OUTPATIENT
Start: 2021-12-23 | End: 2021-12-29 | Stop reason: SDUPTHER

## 2021-12-23 NOTE — TELEPHONE ENCOUNTER
----- Message from Elen Diaz sent at 12/23/2021  3:17 PM EST -----  Subject: Message to Provider    QUESTIONS  Information for Provider? Patient stated that he was instructed to call in   once per week to talk to his pcp and would like a call back. ---------------------------------------------------------------------------  --------------  Santi Mejiadedrick OSORIO  What is the best way for the office to contact you? OK to leave message on   voicemail  Preferred Call Back Phone Number? [phone]  ---------------------------------------------------------------------------  --------------  SCRIPT ANSWERS  Relationship to Patient?  Self

## 2021-12-23 NOTE — TELEPHONE ENCOUNTER
Called and discussed  He is using O2 as needed   [id number] [address] if just sitting but exertion will get koroma  He will obtain disability paperwork as his employer indicated will not be able to work with O2 etc  He also is weak  He will talk to cardiology about coumadin transition in future due to cost of the eliquis   He will also contact the  as well to see if assistance is available   He will continue to keep us abreast of info and status  He needs med refilled to local pharmcy instead of the hospital     Orders Placed This Encounter   Medications    dilTIAZem (CARDIZEM CD) 120 MG extended release capsule     Sig: Take 1 capsule by mouth daily     Dispense:  30 capsule     Refill:  3

## 2021-12-27 NOTE — PROGRESS NOTES
[address]   Electrophysiology      Date: 12/29/2021    Primary Cardiologist: Tali Batres MD  PCP: Brock Ayoub MD     Chief Complaint:   Chief Complaint   Patient presents with    Follow-up     c/o a-fib     History of Present Illness:    I saw Brittnee Maya in the office for electrophysiology follow up today. He is a 62 y.o. male with a past medical history of atrial fibrillation first noted in November 2021 when also diagnosed with Covid. He underwent successful cardioversion on 12/14/21 with Dr. Almas Tello and has plans for an upcoming atrial fibrillation ablation. He was back in atrial fibrillation when seen in the office last week and his flecainide was increased to 100mg BID. He presents today for follow up. He continues to feel very fatigued and short of breath. He hasn't been using his oxygen as he thinks his continued dyspnea is from his atrial fibrillation. He has rare times where he feels like he has more energy but they don't occur very often. Denies any chest pain, syncope or edema. No bleeding issues or missed doses of Eliquis. Allergies: Allergies   Allergen Reactions    Amoxicillin     Pcn [Penicillins]      Home Medications:  Prior to Visit Medications    Medication Sig Taking? Authorizing Provider   dilTIAZem (CARDIZEM CD) 120 MG extended release capsule Take 2 capsules by mouth daily Yes BALAJI Cheung CNP   flecainide (TAMBOCOR) 100 MG tablet Take 1 tablet by mouth 2 times daily Yes BALAJI Cheung CNP   apixaban (ELIQUIS) 5 MG TABS tablet Take 1 tablet by mouth 2 times daily Yes Familia Chung MD        Past Medical History:  Past Medical History:   Diagnosis Date    COVID-19        Past Surgical History:   Past Surgical History:   Procedure Laterality Date    TONSILLECTOMY      as a child       Social History:   reports that he has never smoked. He has never used smokeless tobacco. He reports previous drug use. He reports that he does not drink alcohol. Family History:      Problem Relation Age of Onset    Diabetes Mother     High Blood Pressure Mother     Heart Disease Brother        Review of Systems   Constitutional: Positive for fatigue. Negative for chills, fever and unexpected weight change. HENT: Negative for congestion, hearing loss, sinus pressure, sore throat and trouble swallowing. Respiratory: Positive for shortness of breath. Negative for cough and wheezing. Cardiovascular: Negative for chest pain, palpitations and leg swelling. Gastrointestinal: Negative for abdominal pain, blood in stool, constipation, diarrhea, nausea and vomiting. Genitourinary: Negative for hematuria. Musculoskeletal: Negative for arthralgias, back pain, gait problem and myalgias. Skin: Negative for color change, rash and wound. Neurological: Negative for dizziness, seizures, syncope, speech difficulty, weakness and light-headedness. Hematological: Does not bruise/bleed easily. Physical Examination:  Vitals:    12/29/21 0830   BP: (!) 144/88   Pulse:    SpO2:       Wt Readings from Last 3 Encounters:   12/29/21 210 lb 6.4 oz (95.4 kg)   12/21/21 209 lb (94.8 kg)   12/08/21 209 lb (94.8 kg)       Physical Exam  Vitals reviewed. Constitutional:       General: He is not in acute distress. Appearance: Normal appearance. HENT:      Head: Normocephalic and atraumatic. Nose: Nose normal.      Mouth/Throat:      Mouth: Mucous membranes are moist.   Eyes:      Conjunctiva/sclera: Conjunctivae normal.      Pupils: Pupils are equal, round, and reactive to light. Cardiovascular:      Rate and Rhythm: Tachycardia present. Rhythm irregularly irregular. Heart sounds: No murmur heard. No friction rub. No gallop. Pulmonary:      Effort: No respiratory distress. Breath sounds: No wheezing, rhonchi or rales. Abdominal:      General: Abdomen is flat. Bowel sounds are normal.      Palpations: Abdomen is soft.    Musculoskeletal: General: Normal range of motion. Right lower leg: No edema. Left lower leg: No edema. Skin:     General: Skin is warm and dry. Findings: No bruising. Neurological:      General: No focal deficit present. Mental Status: He is alert and oriented to person, place, and time. Motor: No weakness. Psychiatric:         Mood and Affect: Mood normal.         Behavior: Behavior normal.          Pertinent labs, diagnostic, device, and imaging results reviewed as a part of this visit    LABS    CBC:   Lab Results   Component Value Date    WBC 17.8 (H) 2021    HGB 14.5 2021    HCT 44.0 2021    MCV 82.7 2021     2021     BMP:   Lab Results   Component Value Date    CREATININE 1.0 2021    BUN 25 (H) 2021     2021    K 4.8 2021     2021    CO2 23 2021     Estimated Creatinine Clearance: 93 mL/min (based on SCr of 1 mg/dL). No results found for: BNP    Thyroid:   Lab Results   Component Value Date    TSH 2.99 2021     Lipid Panel:   Lab Results   Component Value Date    CHOL 119 2020    HDL 32 2020    TRIG 68 2020     LFTs:  Lab Results   Component Value Date    ALT 65 (H) 2021    AST 20 2021    ALKPHOS 65 2021    BILITOT 0.5 2021     Coags:   Lab Results   Component Value Date    PROTIME 11.5 2015    INR 1.06 2015    APTT 25.4 2015       EC2021   Atrial fibrillation at 119 BPM. Non-specific ST-T wave changes. EP Procedures:   1.  Successful DCCV for persistent atrial fibrillation, 21, Dr. Kendra Cisneros:    Persistent atrial fibrillation   - first seen on EKG on 21   - s/p successful DCCV on 21   - on flecainide 100mg BID, Cardizem 120mg QD - increase to 240mg QD for rate control   - CHADS2-VASc 1 (HTN) on Eliquis 5mg BID   - EKG today with atrial fibrillation   - discussed treatment options including continued medical therapy, repeat cardioversion, he is willing to repeat cardioversion in the near future so he can hopefully feel better and get back to work, may need to stay on flecainide 100mg BID after cardioversion as 50mg BID did not keep him in sinus    - plans for upcoming ablation, will try to get that scheduled in the next couple months but he was advised he can return back to work prior to the ablation    Essential HTN   - elevated in the office, will increase Cardizem for this and rate control    Thank you for allowing to us to participate in the care of Ryanxochitl Farrukh. Follow up after cardioversion.     BALAJI Desai  [facility], [address] [place] [address]  Phone: [phone]  Fax: [phone]    Electronically signed by BALAJI Hamilton - CNP on 12/29/2021 at 8:52 AM

## 2021-12-29 ENCOUNTER — HOSPITAL ENCOUNTER (EMERGENCY)
Age: 61
Discharge: HOME | End: 2021-12-29

## 2021-12-29 ENCOUNTER — OFFICE VISIT (OUTPATIENT)
Dept: CARDIOLOGY CLINIC | Age: 61
End: 2021-12-29

## 2021-12-29 VITALS
OXYGEN SATURATION: 95 % | HEART RATE: 70 BPM | HEIGHT: 71 IN | DIASTOLIC BLOOD PRESSURE: 88 MMHG | BODY MASS INDEX: 29.46 KG/M2 | WEIGHT: 210.4 LBS | SYSTOLIC BLOOD PRESSURE: 144 MMHG

## 2021-12-29 VITALS
SYSTOLIC BLOOD PRESSURE: 154 MMHG | RESPIRATION RATE: 16 BRPM | BODY MASS INDEX: 29.4 KG/M2 | WEIGHT: 210 LBS | OXYGEN SATURATION: 93 % | HEART RATE: 59 BPM | TEMPERATURE: 98.1 F | HEIGHT: 71 IN | DIASTOLIC BLOOD PRESSURE: 95 MMHG

## 2021-12-29 DIAGNOSIS — Z79.01 ANTICOAGULATED: ICD-10-CM

## 2021-12-29 DIAGNOSIS — I10 ESSENTIAL HYPERTENSION: ICD-10-CM

## 2021-12-29 DIAGNOSIS — R04.0 EPISTAXIS: Primary | ICD-10-CM

## 2021-12-29 DIAGNOSIS — I48.19 PERSISTENT ATRIAL FIBRILLATION (HCC): Primary | ICD-10-CM

## 2021-12-29 LAB
HCT VFR BLD CALC: 40.9 % (ref 40.5–52.5)
HEMOGLOBIN: 13.7 G/DL (ref 13.5–17.5)

## 2021-12-29 PROCEDURE — 99284 EMERGENCY DEPT VISIT MOD MDM: CPT

## 2021-12-29 PROCEDURE — 85018 HEMOGLOBIN: CPT

## 2021-12-29 PROCEDURE — 6370000000 HC RX 637 (ALT 250 FOR IP): Performed by: PHYSICIAN ASSISTANT

## 2021-12-29 PROCEDURE — 30901 CONTROL OF NOSEBLEED: CPT

## 2021-12-29 PROCEDURE — 99213 OFFICE O/P EST LOW 20 MIN: CPT | Performed by: NURSE PRACTITIONER

## 2021-12-29 PROCEDURE — 93000 ELECTROCARDIOGRAM COMPLETE: CPT | Performed by: NURSE PRACTITIONER

## 2021-12-29 PROCEDURE — 85014 HEMATOCRIT: CPT

## 2021-12-29 RX ORDER — OXYMETAZOLINE HYDROCHLORIDE 0.05 G/100ML
2 SPRAY NASAL ONCE
Status: COMPLETED | OUTPATIENT
Start: 2021-12-29 | End: 2021-12-29

## 2021-12-29 RX ORDER — DILTIAZEM HYDROCHLORIDE 120 MG/1
240 CAPSULE, COATED, EXTENDED RELEASE ORAL DAILY
Qty: 30 CAPSULE | Refills: 3
Start: 2021-12-29 | End: 2021-12-30 | Stop reason: SDUPTHER

## 2021-12-29 RX ORDER — LIDOCAINE AND PRILOCAINE 25; 25 MG/G; MG/G
CREAM TOPICAL ONCE
Status: COMPLETED | OUTPATIENT
Start: 2021-12-29 | End: 2021-12-29

## 2021-12-29 RX ADMIN — LIDOCAINE AND PRILOCAINE: 25; 25 CREAM TOPICAL at 21:29

## 2021-12-29 RX ADMIN — SILVER NITRATE APPLICATORS 1 EACH: 25; 75 STICK TOPICAL at 22:06

## 2021-12-29 RX ADMIN — OXYMETAZOLINE HCL 2 SPRAY: 0.05 SPRAY NASAL at 19:44

## 2021-12-29 ASSESSMENT — ENCOUNTER SYMPTOMS
DIARRHEA: 0
COLOR CHANGE: 0
SINUS PRESSURE: 0
SORE THROAT: 0
ABDOMINAL PAIN: 0
WHEEZING: 0
TROUBLE SWALLOWING: 0
VOMITING: 0
NAUSEA: 0
BACK PAIN: 0
COUGH: 0
SHORTNESS OF BREATH: 1
CONSTIPATION: 0
BLOOD IN STOOL: 0

## 2021-12-29 NOTE — PATIENT INSTRUCTIONS
If you have any question regarding your cardioversion or would like to proceed with scheduling please contact Dr. Betty Puckett at [phone]. Call [address] at T:  [phone] to screen over the phone for assistance.       Cardioversion Pre Procedure Instructions     Date:____________________________    Arrive at:_________________________    Procedure time:_____________________      The morning of your procedure you will park in the [facility] AT [facility] and report directly to the cath lab to check in. At the information desk stay right and go all the way to the end of the walter, this will take you directly to your check in desk for the cath lab. Pre-Procedure Instructions   1. Do not eat or drink anything after midnight the day of your procedure. 2. Take your Eliquis the morning of the procedure with sips of water. 3. Do not use any lotions, creams or perfume the morning of procedure. .   4. Please have a responsible adult to drive you home after procedure. It is recommended you do not drive for 24 hours after procedure and that someone stay with you for precautionary measures. 5. Cath lab will provide you with all post procedure instructions.

## 2021-12-30 ENCOUNTER — TELEPHONE (OUTPATIENT)
Dept: CARDIOLOGY CLINIC | Age: 61
End: 2021-12-30

## 2021-12-30 ENCOUNTER — HOSPITAL ENCOUNTER (OUTPATIENT)
Dept: CARDIAC CATH/INVASIVE PROCEDURES | Age: 61
Discharge: HOME | End: 2021-12-30
Payer: MEDICAID

## 2021-12-30 ENCOUNTER — TELEPHONE (OUTPATIENT)
Dept: FAMILY MEDICINE CLINIC | Age: 61
End: 2021-12-30

## 2021-12-30 VITALS
HEIGHT: 71 IN | BODY MASS INDEX: 29.54 KG/M2 | RESPIRATION RATE: 20 BRPM | TEMPERATURE: 99 F | WEIGHT: 211 LBS | SYSTOLIC BLOOD PRESSURE: 164 MMHG | DIASTOLIC BLOOD PRESSURE: 106 MMHG | OXYGEN SATURATION: 97 % | HEART RATE: 124 BPM

## 2021-12-30 LAB
EKG ATRIAL RATE: 288 BPM
EKG DIAGNOSIS: NORMAL
EKG Q-T INTERVAL: 372 MS
EKG QRS DURATION: 96 MS
EKG QTC CALCULATION (BAZETT): 498 MS
EKG R AXIS: -6 DEGREES
EKG T AXIS: 9 DEGREES
EKG VENTRICULAR RATE: 108 BPM

## 2021-12-30 PROCEDURE — 99152 MOD SED SAME PHYS/QHP 5/>YRS: CPT | Performed by: INTERNAL MEDICINE

## 2021-12-30 PROCEDURE — 93005 ELECTROCARDIOGRAM TRACING: CPT

## 2021-12-30 PROCEDURE — 92960 CARDIOVERSION ELECTRIC EXT: CPT

## 2021-12-30 PROCEDURE — 92960 CARDIOVERSION ELECTRIC EXT: CPT | Performed by: INTERNAL MEDICINE

## 2021-12-30 PROCEDURE — 93010 ELECTROCARDIOGRAM REPORT: CPT | Performed by: INTERNAL MEDICINE

## 2021-12-30 PROCEDURE — 93005 ELECTROCARDIOGRAM TRACING: CPT | Performed by: INTERNAL MEDICINE

## 2021-12-30 PROCEDURE — 2500000003 HC RX 250 WO HCPCS

## 2021-12-30 RX ORDER — SODIUM CHLORIDE 0.9 % (FLUSH) 0.9 %
5-40 SYRINGE (ML) INJECTION EVERY 12 HOURS SCHEDULED
Status: DISCONTINUED | OUTPATIENT
Start: 2021-12-30 | End: 2021-12-31 | Stop reason: HOSPADM

## 2021-12-30 RX ORDER — SODIUM CHLORIDE 0.9 % (FLUSH) 0.9 %
5-40 SYRINGE (ML) INJECTION EVERY 12 HOURS SCHEDULED
Status: DISCONTINUED | OUTPATIENT
Start: 2021-12-30 | End: 2021-12-30 | Stop reason: SDUPTHER

## 2021-12-30 RX ORDER — SODIUM CHLORIDE 9 MG/ML
25 INJECTION, SOLUTION INTRAVENOUS PRN
Status: DISCONTINUED | OUTPATIENT
Start: 2021-12-30 | End: 2021-12-31 | Stop reason: HOSPADM

## 2021-12-30 RX ORDER — SODIUM CHLORIDE 9 MG/ML
25 INJECTION, SOLUTION INTRAVENOUS PRN
Status: DISCONTINUED | OUTPATIENT
Start: 2021-12-30 | End: 2021-12-30 | Stop reason: SDUPTHER

## 2021-12-30 RX ORDER — DILTIAZEM HYDROCHLORIDE 120 MG/1
240 CAPSULE, COATED, EXTENDED RELEASE ORAL DAILY
Qty: 30 CAPSULE | Refills: 3 | Status: SHIPPED | OUTPATIENT
Start: 2021-12-30 | End: 2022-01-27 | Stop reason: SDUPTHER

## 2021-12-30 RX ORDER — SODIUM CHLORIDE 0.9 % (FLUSH) 0.9 %
5-40 SYRINGE (ML) INJECTION PRN
Status: DISCONTINUED | OUTPATIENT
Start: 2021-12-30 | End: 2021-12-30 | Stop reason: SDUPTHER

## 2021-12-30 RX ORDER — SODIUM CHLORIDE 9 MG/ML
INJECTION, SOLUTION INTRAVENOUS CONTINUOUS
Status: DISCONTINUED | OUTPATIENT
Start: 2021-12-30 | End: 2021-12-31 | Stop reason: HOSPADM

## 2021-12-30 RX ORDER — SODIUM CHLORIDE 0.9 % (FLUSH) 0.9 %
5-40 SYRINGE (ML) INJECTION PRN
Status: DISCONTINUED | OUTPATIENT
Start: 2021-12-30 | End: 2021-12-31 | Stop reason: HOSPADM

## 2021-12-30 NOTE — PRE SEDATION
Sedation Pre-Procedure Note    Patient Name: Reina Akins   YOB: 1960  Room/Bed: Room/bed info not found  Medical Record Number: [id number]  Date: 12/30/2021   Time: 10:23 AM       Indication:  Persistent atrial fibrillation    Consent: I have discussed with the patient and/or the patient representative the indication, alternatives, and the possible risks and/or complications of the planned procedure and the anesthesia methods. The patient and/or patient representative appear to understand and agree to proceed. Vital Signs: There were no vitals filed for this visit. Past Medical History:   has a past medical history of COVID-19. Past Surgical History:   has a past surgical history that includes Tonsillectomy. Medications:   Scheduled Meds:    sodium chloride flush  5-40 mL IntraVENous 2 times per day     Continuous Infusions:    sodium chloride      sodium chloride       PRN Meds: sodium chloride flush, sodium chloride  Home Meds:   Prior to Admission medications    Medication Sig Start Date End Date Taking? Authorizing Provider   dilTIAZem (CARDIZEM CD) 120 MG extended release capsule Take 2 capsules by mouth daily 12/29/21   BALAJI Lindsey CNP   flecainide (TAMBOCOR) 100 MG tablet Take 1 tablet by mouth 2 times daily 12/21/21   BALAJI Lindsey CNP   apixaban (ELIQUIS) 5 MG TABS tablet Take 1 tablet by mouth 2 times daily 11/22/21   Melania Ramos MD     Coumadin Use Last 7 Days:  no  Antiplatelet drug therapy use last 7 days: no  Other anticoagulant use last 7 days: yes - Apixaban  Additional Medication Information:  n/a      Pre-Sedation Documentation and Exam:   I have personally completed a history, physical exam & review of systems for this patient (see notes).     Mallampati Airway Assessment:  Mallampati Class I - (soft palate, fauces, uvula & anterior/posterior tonsillar pillars are visible)    Prior History of Anesthesia Complications:   none    ASA Classification:  Class 2 - A normal healthy patient with mild systemic disease    Sedation/ Anesthesia Plan:   intravenous sedation    Medications Planned:   midazolam (Versed) intravenously, fentanyl intravenously and Methohexital    Patient is an appropriate candidate for plan of sedation: yes    Electronically signed by Fercho Pillai MD on 12/30/2021 at 10:23 AM

## 2021-12-30 NOTE — TELEPHONE ENCOUNTER
Pt was prescribed Rx by hospitalist- pt prefers to be sent to [address] [place] [address] pharmacy due to cost to him.       Medication Refill    Medication needing refilled:  apixaban (ELIQUIS)      Dosage of the medication:  5 MG TABS       How are you taking this medication (QD, BID, TID, QID, PRN):  Take 1 tablet by mouth 2 times daily    30 or 90 day supply called in:30    Which Pharmacy are we sending the medication to?:    [address] [place] [address], [address] [place] [address]  Fax: [phone]

## 2021-12-30 NOTE — TELEPHONE ENCOUNTER
Spoke with patient scheduled atrial fibrillation ablation. Instructed to stop by office after he leave cath lab to  instructions. CTA Pre procedure instructions    The morning of your CTA you will park in the hospital parking lot and report to the reception desk when you first walk in through the main hospital entrance to check in. Date: 1-6-2022  Time: 10:00 am    -Arrive 15 minutes prior to scheduled testing time  -Nothing to eat or drink for at least 4 hours prior to test.    Atrial Fibrillation Ablation Pre procedure Instructions    Date: 1/27/2022   Arrive at: 11:30 am  Procedure time: 1:00 pm    The morning of your procedure you will park in the hospital parking lot and report directly to the cath lab to check in. At the information desk stay right and go all the way to the end of the walter, this will take you directly to your check in desk for the cath lab. Pre-Procedure Instructions   1. You will need to fast (nothing to eat or drink) after midnight the day of your procedure  2. Do NOT chew gum or eat mints the day of your procedure. 3. You will need to hold your Flecainide for 7 days prior to the procedure. 4. You will need to hold your Eliquis for 12 hours prior to the procedure. 5. Do not use any lotions, creams or perfume the morning of procedure. 6. You will need to complete pre-procedure lab work 4-6 days prior to your procedure. 7. You will need to get a COVID test  4-6 days prior to your procedure, regardless of your vaccination status. You can get you COVID test at the [facility] (SEE BELOW). [facility] & [facility] offer COVID testing at no cost. Please contact the location in which you would like to get the COVID testing completed to make sure it is one of the participating location. If you complete the testing at a location other than [facility] please bring results with you the day of your procedure.   8. Please have a responsible adult to drive you home after procedure, you should go home same day, but there is always a possibility of an overnight stay. 9. Cath lab will provide you with all post procedure instructions  10. A 3 month follow up will be scheduled with Dr. Maida James Nurse practitioner Stephanie Wiggins CNP post procedure                                          [address] [facility] [address] [place] [address]  Phone: [phone]  The hours are Mon -Fri. 6:30 am - 4:00 pm   Saturday 8:00 am - noon  No appointment necessary    You may complete pre procedure labs & COVID test at this location. COVID TESTING CAN ONLY BE COMPLETED   Between hours of 12:30 pm & 4:30 pm  Monday through Friday  No Appointment necessary. There are parking spaces behind the [address] [place] [address] were you see Dr. Gracie Gage designated for COVID test where you can pull up and call number listed on the pole and they will come out and complete covid test while you are in your car. Case published to [place] and form emailed to [place] in cath lab. Letter given to patient  with all pre procedure instructions, including the dates and time of both the CTA as well as the afib ablation. The letter also included detailed instructions regarding completing lab work and COVID test as well as a copy of the lab orders to take to lab.

## 2021-12-30 NOTE — ED PROVIDER NOTES
[address] [facility]        Pt Name: Aarti Ruelas  MRN: [id number]  [place] 1960  Date of evaluation: 12/29/2021  Provider: Samir Hogue PA-C  PCP: Ricardo Mijares MD  Note Started: 9:57 PM EST      GILA. I have evaluated this patient. My supervising physician was available for consultation. Triage CHIEF COMPLAINT       Chief Complaint   Patient presents with    Epistaxis         HISTORY OF PRESENT ILLNESS   (Location/Symptom, Timing/Onset, Context/Setting, Quality, Duration, Modifying Factors, Severity)  Note limiting factors. Chief Complaint: epistaxis    Aarti Ruelas is a 62 y.o. male who presents stating that his left-sided nosebleed started at 5:40 PM today. This is the fourth time it occurred in the last 5 days or so, each time on the left side. He is anticoagulated on Eliquis. He states he has not had any dizziness or confusion syncope or near syncope. He is concerned about how much blood he has lost.  Therefore he came in. This nosebleed has not stopped so far and he is concerned about that as well. No nausea or vomiting. Mild to small amount of nasal bleeding down the back of the throat however significant dripping and running from the left nostril occurs anytime he takes his hand away from it. Nursing Notes were all reviewed and agreed with or any disagreements were addressed in the HPI. REVIEW OF SYSTEMS    (2-9 systems for level 4, 10 or more for level 5)     Review of Systems  No acute fall contusion or twisting injury headache vision change neck or stiffness. No acute shortness of breath or chest pain compared to usual.  No syncope or near syncope dizziness or confusion. No abdominal pain nausea or vomiting. No acute urine or stool changes or extremity acute loss range of motion or strength or sensation.   PAST MEDICAL HISTORY     Past Medical History:   Diagnosis Date    COVID-19        SURGICAL HISTORY     Past Surgical History:   Procedure Laterality Date    TONSILLECTOMY      as a child       CURRENTMEDICATIONS       Previous Medications    APIXABAN (ELIQUIS) 5 MG TABS TABLET    Take 1 tablet by mouth 2 times daily    DILTIAZEM (CARDIZEM CD) 120 MG EXTENDED RELEASE CAPSULE    Take 2 capsules by mouth daily    FLECAINIDE (TAMBOCOR) 100 MG TABLET    Take 1 tablet by mouth 2 times daily       ALLERGIES     Amoxicillin and Pcn [penicillins]    FAMILYHISTORY       Family History   Problem Relation Age of Onset    Diabetes Mother     High Blood Pressure Mother     Heart Disease Brother         SOCIAL HISTORY       Social History     Socioeconomic History    Marital status:      Spouse name: Not on file    Number of children: 1    Years of education: Not on file    Highest education level: Not on file   Occupational History    Not on file   Tobacco Use    Smoking status: Never Smoker    Smokeless tobacco: Never Used   Substance and Sexual Activity    Alcohol use: No    Drug use: Not Currently    Sexual activity: Not Currently   Other Topics Concern    Not on file   Social History Narrative    Not on file     Social Determinants of Health     Financial Resource Strain: Low Risk     Difficulty of Paying Living Expenses: Not hard at all   Food Insecurity: No Food Insecurity    Worried About Running Out of Food in the Last Year: Never true    Parveen of Food in the Last Year: Never true   Transportation Needs:     Lack of Transportation (Medical): Not on file    Lack of Transportation (Non-Medical):  Not on file   Physical Activity:     Days of Exercise per Week: Not on file    Minutes of Exercise per Session: Not on file   Stress:     Feeling of Stress : Not on file   Social Connections:     Frequency of Communication with Friends and Family: Not on file    Frequency of Social Gatherings with Friends and Family: Not on file    Attends Jain Services: Not on file   [employer] Active Member of Clubs or Organizations: Not on file    Attends Club or Organization Meetings: Not on file    Marital Status: Not on file   Intimate Partner Violence:     Fear of Current or Ex-Partner: Not on file    Emotionally Abused: Not on file    Physically Abused: Not on file    Sexually Abused: Not on file   Housing Stability:     Unable to Pay for Housing in the Last Year: Not on file    Number of [place] in the Last Year: Not on file    Unstable Housing in the Last Year: Not on file       SCREENINGS             PHYSICAL EXAM    (up to 7 for level 4, 8 or more for level 5)     ED Triage Vitals [12/29/21 1912]   BP Temp Temp src Pulse Resp SpO2 Height Weight   (!) 154/95 98.1 °F (36.7 °C) -- 59 16 93 % 5' 11\" (1.803 m) 210 lb (95.3 kg)       Physical Exam  Vitals and nursing note reviewed. Constitutional:       Appearance: Normal appearance. He is not diaphoretic. HENT:      Head: Normocephalic and atraumatic. Right Ear: External ear normal.      Left Ear: External ear normal.      Nose: No nasal deformity or septal deviation. Left Nostril: Epistaxis present. Comments: Patient has rapid dripping from the left nostril when the towel was brought away from his nose. Also a small amount of posterior nasal bloody drainage seen initially. Mouth/Throat:      Mouth: Mucous membranes are moist.      Comments: Gag reflex intact  Eyes:      General:         Right eye: No discharge. Left eye: No discharge. Conjunctiva/sclera: Conjunctivae normal.   Cardiovascular:      Rate and Rhythm: Normal rate. Rhythm irregular. Pulses: Normal pulses. Heart sounds: Normal heart sounds. No murmur heard. No gallop. Pulmonary:      Effort: Pulmonary effort is normal. No respiratory distress. Breath sounds: Normal breath sounds. No wheezing, rhonchi or rales. Abdominal:      Palpations: Abdomen is soft. Tenderness: There is no abdominal tenderness.  There is no right CVA tenderness or left CVA tenderness. Musculoskeletal:         General: No swelling, tenderness, deformity or signs of injury. Normal range of motion. Cervical back: Normal range of motion and neck supple. No rigidity or tenderness. Lymphadenopathy:      Cervical: No cervical adenopathy. Skin:     General: Skin is warm and dry. Capillary Refill: Capillary refill takes less than 2 seconds. Findings: No rash. Neurological:      Mental Status: He is alert and oriented to person, place, and time. Mental status is at baseline. Psychiatric:         Mood and Affect: Mood normal.         Behavior: Behavior normal.         DIAGNOSTIC RESULTS   LABS:    Labs Reviewed   HEMOGLOBIN AND HEMATOCRIT, BLOOD    Narrative:     Performed at:  [address] [address]   Phone [phone]       When ordered, only abnormal lab results are displayed. All other labs were within normal range or not returned as of this dictation. EKG: When ordered, EKG's are interpreted by the Emergency Department Physician in the absence of a cardiologist.  Please see their note for interpretation of EKG. RADIOLOGY:   Non-plain film images such as CT, Ultrasound and MRI are read by the radiologist. Plain radiographic images are visualized andpreliminarily interpreted by the  ED Provider with the below findings:        Interpretation pert Radiologist below, if available at the time of this note:    No orders to display     No results found. PROCEDURES   Unless otherwise noted below, none     Epistaxis Mgmt    Date/Time: 12/29/2021 10:00 PM  Performed by: Yoon Taylor PA-C  Authorized by: Yoon Taylor PA-C     Consent:     Consent obtained:  Verbal    Consent given by:  Patient  Anesthesia (see MAR for exact dosages):      Anesthesia method:  Topical application    Topical anesthetic:  EMLA cream  Procedure details:     Treatment site:  L anterior Treatment method:  Silver nitrate    Treatment complexity: Moderate treatment complexity. First patient blows the clot out of nose, then Afrin applied and clamp reapplied. This was mostly successful in getting the bleeding to stop. Then EMLA cream applied and silver nitrate cautery for full resolution. Treatment episode: initial    Post-procedure details:     Assessment:  Bleeding stopped    Patient tolerance of procedure: Tolerated well, no immediate complications        CRITICAL CARE TIME   N/A    CONSULTS:  None      EMERGENCY DEPARTMENT COURSE and DIFFERENTIAL DIAGNOSIS/MDM:   Vitals:    Vitals:    12/29/21 1912   BP: (!) 154/95   Pulse: 59   Resp: 16   Temp: 98.1 °F (36.7 °C)   SpO2: 93%   Weight: 210 lb (95.3 kg)   Height: 5' 11\" (1.803 m)       Patient was given thefollowing medications:  Medications   silver nitrate applicators applicator 1 each (has no administration in time range)   oxymetazoline (AFRIN) 0.05 % nasal spray 2 spray (2 sprays Each Nostril Given 12/29/21 1944)   lidocaine-prilocaine (EMLA) cream ( Topical Given 12/29/21 2129)         This patient presents as above and evaluation and treatment is begun here. Ultimately good resolution of the patient's epistaxis is achieved here. Also secondary to patient's history of this being the fourth nosebleed over the last for 5 days, H&H is drawn for the patient. No syncope or near syncope or hypoxia or other acute worrisome sign or indication of acute system compromise. It appears the H&H levels are both within normal limits as they return. Vital signs also stable for the patient. Conservative home care discussed with the patient who verbalized understanding and agreement with the above and the following discharge home plan. Home start using a saline nasal gel to keep nose membranes hydrated, and monitor for good improvement. Follow-up outpatient with ENT for further care and treatment if needed.   Return to the emergency department for any emergency worsening or concern. FINAL IMPRESSION      1. Epistaxis    2. Anticoagulated          DISPOSITION/PLAN   DISPOSITION Decision To Discharge 12/29/2021 09:55:23 PM      PATIENT REFERREDTO:  [address]   [facility] [address] [address]  Call   As needed      DISCHARGE MEDICATIONS:  New Prescriptions    No medications on file       DISCONTINUED MEDICATIONS:  Discontinued Medications    No medications on file              (Please note that portions ofthis note were completed with a voice recognition program.  Efforts were made to edit the dictations but occasionally words are mis-transcribed.)    Arely Sarkar PA-C (electronically signed)             Arely Sarkar PA-C  12/29/21 4158

## 2021-12-30 NOTE — H&P
Cardiac Electrophysiology Consultation   Date: 12/30/2021  Reason for Consultation: Atrial fibrillation  Consult Requesting Jacob Bartholomew MD     Chief Complaint:           Chief Complaint   Patient presents with    New Patient       afib         HPI: Hoang Brandon is a 62 y. o. patient with a history of COVID with new onset atrial fibrillation. He presented to [facility] on 11/12/2021 with complaints of generalized malaise, cough, and shortness of breath for the past week. Silvana Key was diagnosed with COVID-19 but states that he continued to feel worse so he sought medical attention in the emergency department.  The patient was not vaccinated. Silvana Key was found to be in atrial fibrillation with a rapid ventricular response prompting consultation. \" Was treated with IV Cardizem but had hypotension so this was changed to IV amiodarone, which was stopped on 11/14. Currently on PO Cardizem for rate control.     Interval History: Dione Mac presents to office to discuss treatment options for his atrial fibrillation. He states in July he was having a skin procedure after an allergic reaction to one of his medications and was told that he had atrial fibrillation he denied having symptoms of shortness of breath on exertion or fatigue at that time. He states he was never short of breath until he got Bobbye Scale is wearing oxygen NC and states he did not wear oxygen before he had COVD. He reports that he continues to have shortness of breath and fatigue on exertion. He states since he has been ill he has felt a fluttering in his chest. Patient does not endorse any identifiable exacerbating or alleviating factors for the atrial fibrillation. He is compliant with his medications and tolerating them well.  He denies chest pain/pressure, tightness, edema, shortness of breath, heart racing, palpitations, lightheadedness, dizziness, syncope, presyncope,  PND or orthopnea.         Past Medical History           Past Medical History:   Diagnosis Date    COVID-19              Past Surgical History             Past Surgical History:   Procedure Laterality Date    TONSILLECTOMY         as a child            Allergies:          Allergies   Allergen Reactions    Amoxicillin      Pcn [Penicillins]           Medication:   Home Medications                 Prior to Admission medications    Medication Sig Start Date End Date Taking? Authorizing Provider   apixaban (ELIQUIS) 5 MG TABS tablet Take 1 tablet by mouth 2 times daily 11/22/21   Yes Kareem Khanna MD   dilTIAZem (CARDIZEM CD) 120 MG extended release capsule Take 1 capsule by mouth daily 11/23/21   Yes Kareem Khanna MD            Social History:   reports that he has never smoked. He has never used smokeless tobacco. He reports previous drug use. He reports that he does not drink alcohol.         Family History:  family history includes Diabetes in his mother; Heart Disease in his brother; High Blood Pressure in his mother.   Reviewed. Denies family history of sudden cardiac death, arrhythmia, premature CAD     Review of System:     · General ROS: negative for - chills, fever   · Psychological ROS: negative for - anxiety or depression  · Ophthalmic ROS: negative for - eye pain or loss of vision  · ENT ROS: negative for - epistaxis, headaches, nasal discharge, sore throat   · Allergy and Immunology ROS: negative for - hives, nasal congestion   · Hematological and Lymphatic ROS: negative for - bleeding problems, blood clots, bruising or jaundice  · Endocrine ROS: negative for - skin changes, temperature intolerance or unexpected weight changes  · Respiratory ROS: negative for - cough, hemoptysis, pleuritic pain, SOB, sputum changes or wheezing  · Cardiovascular ROS: Per HPI.    · Gastrointestinal ROS: negative for - abdominal pain, blood in stools, diarrhea, hematemesis, melena, nausea/vomiting or swallowing difficulty/pain  · Genito-Urinary ROS: negative for - dysuria or score of 1, he is not necessarily indicated for [address] [place] [address] at this time. However, if he wishes to pursue ablation, she will need to be on [address] [place] [address] for the next 3 months after ablation.     - Treatment options for atrial fibrillation including cardioversion, anti-arrhythmic medication therapy, rate control strategy, oral anticoagulation, and atrial fibrillation ablation were discussed with patient. Risks, benefits and alternative of each treatment options (care, treatment, and services) were explained. The patient was also presented reasonable alternatives to the proposed care, treatment, and services. The discussion I have had with the patient encompassed risks, benefits, and side effects related to the alternatives and the risks related to not receiving the proposed care, treatment and services. All questions were answered.      Recommend that he undergo CV with MARTY to restore normal sinus rhythm while awaiting for the ablation procedure. Patient agreeable.     We educated the patient that atrial fibrillation is a worsening and progressive disease, with more frequent episodes that will ensue. Subsequent episodes usually become more sustained to the extent that many individuals would then develop persistent atrial fibrillation. Once persistence is reached, permanent atrial fibrillation is inevitable. We also discussed the fact that atrial fibrillation is associated with stroke, including life-threatening stroke, and therefore oral anticoagulation is warranted depending on the patient's [id number] score.      We discussed different management options for atrial fibrillation including their risks and benefits.  These options include use of cardioversion (mainly for persisting atrial fibrillation or atrial flutter) which provides an effective immediate therapy with success rates of 75% or higher, but it provides no short nor long term efficacy.  Anti-arrhythmic medications provide a very effective short term therapy, but even with our most potent anti-arrhythmic medication there is limited long term efficacy (clinical studies have shown that 40% of patients remain atrial fibrillation-free after 4 years of follow-up after starting one of the more powerful anti-arrhythmic medication (amiodarone), and, if extrapolated, may have further diminishing success as time goes on). Atrial fibrillation ablation is a potentially curative therapy with very reasonable success rate after a first time procedure and with improving success rates with subsequent procedures.      The risks, benefits and alternatives of the atrial fibrillation ablation procedure were discussed with the patient. The risks including, but not limited to, bleeding, infection, radiation exposure, injury to vascular, cardiac and surrounding structures (including pneumothorax), stroke, cardiac perforation, tamponade, need for emergent open heart surgery, need for pacemaker implantation, injury to the phrenic nerve, injury to the esophagus, myocardial infarction and death were discussed in detail. The patient was also counseled at length about the risks of tan Covid-19 in the ghislaine-operative and post-operative states including the recovery window of their procedure. The patient was made aware that tan Covid-19 after a surgical procedure may worsen their prognosis for recovering from the virus and lend to a higher morbidity and or mortality risk. The patient was given the option of postponing their procedure. The patient was also presented reasonable alternatives to the proposed care, treatment, and services. The discussion I have had with the patient encompassed risks, benefits, and side effects related to the alternatives and the risks related to not receiving the proposed care, treatment and services.      The patient opted to proceed with the atrial fibrillation ablation.  We will schedule for a radiofrequency ablation with Carto Navigation system with a MARTY procedure immediately prior to this ablation. A cardiac CTA will be ordered for pulmonary vein mapping prior to this procedure. We will hold Eliquis  for 12 hours prior to procedure. We will order BMP, CBC, PT/INR, and Type & Screen prior to the procedure.      -Encouraged to watch \"That Sugar Film\" on [employer], which discusses the negative impact of processed sugar has on the body.     -Much time was spent counseling the patient on healthier lifestyle, following a low sodium diet <2,400 mg of sodium a day and dramatically decreasing the intake of processed sugar.     -Patient educated to eat a diet which includes organic fruits, vegetables and meats.        Follow up three months after ablation procedure with Kayla Lawson CNP. He will follow up one to two weeks after cardioversion with Kayla Lawson CNP.     Thank you for allowing me to participate in the care of Hoang Brandon. All questions and concerns were addressed to the patient/family. Alternatives to my treatment were discussed.      I have reviewed the history and physical and examined the patient and find no relevant changes. I have reviewed with the patient and/or family the risks and benefits to the proposed procedure. The patient was presented with the option of postponing the proposed procedure. The patient was also presented reasonable alternatives to the proposed care, treatment, and services.  The discussion I have had with the patient encompassed risks, benefits, and side effects related to the alternatives and the risks related to not receiving the proposed care, treatment and services.      Alfredo Márquez MD, [address], [place] [address] Electrophysiology  [address]  [address] [facility], [address] [place] [address]  [phone]

## 2021-12-30 NOTE — ED NOTES
Bed: D-49  Expected date: 12/29/21  Expected time: 6:43 PM  Means of arrival: [facility]  Comments:  Erlinda Lowe RN  12/29/21 2454

## 2021-12-30 NOTE — PROCEDURES
[address]     Electrophysiology Procedure Note       Date of Procedure: 12/30/2021  Patient's Name: Paul Atkinson  YOB: 1960   Medical Record Number: [id number]  Referring Physician: Dilcia skinner. providers found  Procedure Performed by: Angel Luis Lopez MD    Procedures performed:  · Anesthesia: Monitored Anesthesia Care  · Level of sedation plan: Moderate sedation (conscious sedation) with intravenous Methohexital 40 mg  · Sedation start time: 1101  · Sedation stop time: 1103  · Mallampati airway assessment class: I  · ASA class: 1  · External Electrical cardioversion     Indication of the procedure: Symptomatic, persistent atrial fibrillation      Details of procedure: The patient was brought to the cath lab area in a fasting and non-sedated state. The risks, benefits and alternatives of the procedure were discussed with the patient. The risks including, but not limited to, the risks of vascular injury, bleeding, infection, any pre-existing cardiac implantable electronic device malfunction, any pre-existing cardiac implantable electronic device's lead dislodgement, injury to cardiac and surrounding structures (including pneumothorax), stroke, myocardial infarction and death were discussed in detail. The patient was also counseled at length about the risks of tan Covid-19 in the ghislaine-operative and post-operative states including the recovery window of their procedure. The patient was made aware that tan Covid-19 after a surgical procedure may worsen their prognosis for recovering from the virus and lend to a higher morbidity and or mortality risk. The patient was given the option of postponing their procedure. The patient was also presented reasonable alternatives to the proposed care, treatment, and services.  The discussion I have had with the patient encompassed risks, benefits, and side effects related to the alternatives and the risks related to not receiving the proposed care, treatment and services. The patient opted to proceed with the procedure. Written informed consent was signed and placed in the chart. A timeout protocol was completed to identify the patient and the procedure being performed. An independent trained observer assumed the sole responsibility of administering IV sedation medication - Versed, Fentanyl - at my direction and closely monitored the patient. Patient is on chronic anticoagulation therapy with Eliquis 5mg po BID uninterrupted for at least the last 3 weeks. The patient was monitored continuously with ECG, pulse oximetry, blood pressure monitoring, and direct observation. We then administered intravenous Methohexital for sedation, and electrical DC cardioversion was performed using 200J, synchronized shock. Patient was converted to sinus rhythm for the next 10 seconds and then reverted to atrial fibrillation. Within 3 minutes, we delivered another 200J of synchronized, biphasic shock which converted him to sinus rhythm. Within 5 minutes, he reverted to atrial fibrillation. Specimen collected: none       The patient tolerated the procedure well and there were no complications. Conclusion:   Successful external DC cardioversion of symptomatic, persistent atrial fibrillation. Return of atrial fibrillation within 5 minutes. Plan:   The patient can be discharged if remains stable. Will continue with apixaban 5mg po BID, Flecainide 100mg po BID, and Diltiazem CD increased from 120mg to now 240mg daily. The patient will follow-up with PRESTON Bass NP, as an outpatient within 1-2 weeks to see if the patient has converted to sinus rhythm chemically. If not, Flecainide will be discontinued and rate controlled will be employed (possible addition of digoxin) while we await atrial fibrillation ablation which he is being scheduled for. Thank you for allowing me to participate in the care of this patient.  If you have any questions, please feel free to contact me.     Dilia Delgado MD, MS, [facility] [place], [facility]  Cardiac Electrophysiology  [address]  [address] [place], [address] [facility]  [address]  [phone]

## 2021-12-30 NOTE — ED TRIAGE NOTES
Patient is experiencing his 4th nosebleed since starting eliquis 30 days ago, he awoke with a nosebleed that he couldn't stop and takes eliquis for afib.  Patient is scheduled for cardioversion here, tomorrow

## 2021-12-30 NOTE — TELEPHONE ENCOUNTER
----- Message from Cece Anthony sent at 12/30/2021  3:55 PM EST -----  Subject: Message to Provider    QUESTIONS  Information for Provider? PT called state that he was instructed to call   his pcp every week.  ---------------------------------------------------------------------------  --------------  CALL BACK INFO  What is the best way for the office to contact you? OK to leave message on   voicemail  Preferred Call Back Phone Number? [phone]  ---------------------------------------------------------------------------  --------------  SCRIPT ANSWERS  Relationship to Patient?  Self

## 2022-01-03 NOTE — TELEPHONE ENCOUNTER
[phone] ([place])  - Left msg for Carla Schaefer with information for Dr. Sarah Hernandes [phone].

## 2022-01-06 ENCOUNTER — HOSPITAL ENCOUNTER (OUTPATIENT)
Dept: CT IMAGING | Age: 62
Discharge: HOME | End: 2022-01-06

## 2022-01-06 DIAGNOSIS — I48.0 PAF (PAROXYSMAL ATRIAL FIBRILLATION) (HCC): ICD-10-CM

## 2022-01-06 LAB
GFR AFRICAN AMERICAN: >60
GFR NON-AFRICAN AMERICAN: >60
PERFORMED ON: NORMAL
POC CREATININE: 0.9 MG/DL (ref 0.8–1.3)
POC SAMPLE TYPE: NORMAL

## 2022-01-06 PROCEDURE — 75574 CT ANGIO HRT W/3D IMAGE: CPT

## 2022-01-06 PROCEDURE — 82565 ASSAY OF CREATININE: CPT

## 2022-01-06 PROCEDURE — 6360000004 HC RX CONTRAST MEDICATION: Performed by: FAMILY MEDICINE

## 2022-01-06 RX ADMIN — IOPAMIDOL 75 ML: 755 INJECTION, SOLUTION INTRAVENOUS at 09:50

## 2022-01-07 NOTE — PROGRESS NOTES
[facility]   Electrophysiology      Date: 1/12/2022    Primary Cardiologist: Myles Govea MD  PCP: Corrine Moreno MD     Chief Complaint:   Chief Complaint   Patient presents with    Follow-up     afib - SOB     History of Present Illness:    I saw Gigi Marx in the office for electrophysiology follow up today. He is a 65 y.o. male with a past medical history of atrial fibrillation first noted in November 2021 when also diagnosed with Covid. He underwent successful cardioversion on 12/14/21 with Dr. Laura Trammell and has plans for an upcoming atrial fibrillation ablation. He was back in atrial fibrillation when seen in the office on 12/21/21 and his flecainide was increased to 100mg BID. He underwent successful cardioversion on 12/30/21 but was back in atrial fibrillation about 5 minutes after. His Cardizem was increase to 240mg QD and presents for follow up today. He does have plans for upcoming ablation. He had an obstructive L kidney stone noted on CTA and was seen in the ED yesterday for L flank pain. Started on Flomax and pain medications. Still needs to contact urology for an appointment. Said his dyspnea and fatigue have improved, he thinks his Covid is finally starting to go away. Denies any syncope or chest pain. No bleeding issues. Allergies: Allergies   Allergen Reactions    Amoxicillin     Pcn [Penicillins]      Home Medications:  Prior to Visit Medications    Medication Sig Taking? Authorizing Provider   oxyCODONE-acetaminophen (PERCOCET) 5-325 MG per tablet Take 1 tablet by mouth every 4 hours as needed for Pain for up to 3 days.  Yes Rocíoine Nissen, PA-C   ondansetron (ZOFRAN ODT) 4 MG disintegrating tablet Take 1 tablet by mouth every 12 hours as needed for Nausea Yes Rocío Nissen, PA-C   tamsulosin (FLOMAX) 0.4 MG capsule Take 1 capsule by mouth daily Yes Rocíoine Nissen, PA-C   apixaban (ELIQUIS) 5 MG TABS tablet Take 1 tablet by mouth 2 times daily Yes Basia Lock, APRN - CNP dilTIAZem (CARDIZEM CD) 120 MG extended release capsule Take 2 capsules by mouth daily Yes Bud Moreland MD   flecainide (TAMBOCOR) 100 MG tablet Take 1 tablet by mouth 2 times daily Yes Ramila Nash APRN - CNP        Past Medical History:  Past Medical History:   Diagnosis Date    COVID-19        Past Surgical History:   Past Surgical History:   Procedure Laterality Date    TONSILLECTOMY      as a child       Social History:   reports that he has never smoked. He has never used smokeless tobacco. He reports previous drug use. He reports that he does not drink alcohol. Family History:      Problem Relation Age of Onset    Diabetes Mother     High Blood Pressure Mother     Heart Disease Brother        Review of Systems   Constitutional: Positive for fatigue. Negative for chills, fever and unexpected weight change. HENT: Negative for congestion, hearing loss, sinus pressure, sore throat and trouble swallowing. Respiratory: Positive for shortness of breath. Negative for cough and wheezing. Cardiovascular: Negative for chest pain, palpitations and leg swelling. Gastrointestinal: Negative for abdominal pain, blood in stool, constipation, diarrhea, nausea and vomiting. Genitourinary: Positive for flank pain (L side). Negative for hematuria. Musculoskeletal: Negative for arthralgias, back pain, gait problem and myalgias. Skin: Negative for color change, rash and wound. Neurological: Negative for dizziness, seizures, syncope, speech difficulty, weakness and light-headedness. Hematological: Does not bruise/bleed easily. Physical Examination:  Vitals:    01/12/22 0901   BP: 118/78   Pulse: 126   SpO2: 97%      Wt Readings from Last 3 Encounters:   01/12/22 208 lb (94.3 kg)   01/11/22 211 lb 10.3 oz (96 kg)   12/30/21 211 lb (95.7 kg)       Physical Exam  Vitals reviewed. Constitutional:       General: He is not in acute distress. Appearance: Normal appearance.    HENT: Head: Normocephalic and atraumatic. Nose: Nose normal.      Mouth/Throat:      Mouth: Mucous membranes are moist.   Eyes:      Conjunctiva/sclera: Conjunctivae normal.      Pupils: Pupils are equal, round, and reactive to light. Cardiovascular:      Rate and Rhythm: Normal rate. Rhythm irregularly irregular. Heart sounds: No murmur heard. No friction rub. No gallop. Pulmonary:      Effort: No respiratory distress. Breath sounds: No wheezing, rhonchi or rales. Abdominal:      General: Abdomen is flat. Bowel sounds are normal.      Palpations: Abdomen is soft. Musculoskeletal:         General: Normal range of motion. Right lower leg: No edema. Left lower leg: No edema. Skin:     General: Skin is warm and dry. Findings: No bruising. Neurological:      General: No focal deficit present. Mental Status: He is alert and oriented to person, place, and time. Motor: No weakness. Psychiatric:         Mood and Affect: Mood normal.         Behavior: Behavior normal.          Pertinent labs, diagnostic, device, and imaging results reviewed as a part of this visit    LABS    CBC:   Lab Results   Component Value Date    WBC 17.8 (H) 11/22/2021    HGB 13.7 12/29/2021    HCT 40.9 12/29/2021    MCV 82.7 11/22/2021     11/22/2021     BMP:   Lab Results   Component Value Date    CREATININE 0.9 01/06/2022    BUN 25 (H) 11/22/2021     11/22/2021    K 4.8 11/22/2021     11/22/2021    CO2 23 11/22/2021     Estimated Creatinine Clearance: 101 mL/min (based on SCr of 0.9 mg/dL).    No results found for: BNP    Thyroid:   Lab Results   Component Value Date    TSH 2.99 11/13/2021     Lipid Panel:   Lab Results   Component Value Date    CHOL 119 03/18/2020    HDL 32 03/18/2020    TRIG 68 03/18/2020     LFTs:  Lab Results   Component Value Date    ALT 65 (H) 11/22/2021    AST 20 11/22/2021    ALKPHOS 65 11/22/2021    BILITOT 0.5 11/22/2021     Coags:   Lab Results Component Value Date    PROTIME 11.5 2015    INR 1.06 2015    APTT 25.4 2015       EC2022   Atrial fibrillation at 95 BPM.    EP Procedures:   1. Successful DCCV for persistent atrial fibrillation, 21, Dr. Kenney Mins  2. Successful DCCV for persistent atrial fibrillation, 21, Dr. Casie Garduno:    Persistent atrial fibrillation   - first seen on EKG on 21   - s/p successful DCCV on 21    - s/p successful DCCV on 21 but A fib reoccurred within 5 minutes   - on flecainide 100mg BID, Cardizem 240mg QD - stop flecainide and pursue rate control until ablation   - add digoxin   - CHADS2-VASc 1 (HTN) on Eliquis 5mg BID   - EKG today with rate controlled A fib   - plans for upcoming ablation   - recently diagnosed obstructing kidney stone, needs to see urology and explained to him that if he needs a procedure where he has to be off anticoagulation, this needs done prior to his ablation. Will call if urology planning on procedure    Essential HTN   - BP controlled    Thank you for allowing to us to participate in the care of [facility]. Follow up after ablation.     BALAJI Gray  [facility] [address]  Phone: [phone]  Fax: [phone]    Electronically signed by BALAJI Mendiola - CNP on 2022 at 9:12 AM

## 2022-01-11 ENCOUNTER — HOSPITAL ENCOUNTER (EMERGENCY)
Age: 62
Discharge: HOME | End: 2022-01-11

## 2022-01-11 ENCOUNTER — OFFICE VISIT (OUTPATIENT)
Dept: ENT CLINIC | Age: 62
End: 2022-01-11

## 2022-01-11 ENCOUNTER — APPOINTMENT (OUTPATIENT)
Dept: CT IMAGING | Age: 62
End: 2022-01-11

## 2022-01-11 VITALS
SYSTOLIC BLOOD PRESSURE: 134 MMHG | WEIGHT: 211.64 LBS | RESPIRATION RATE: 18 BRPM | OXYGEN SATURATION: 98 % | HEIGHT: 71 IN | TEMPERATURE: 97.8 F | HEART RATE: 98 BPM | BODY MASS INDEX: 29.63 KG/M2 | DIASTOLIC BLOOD PRESSURE: 71 MMHG

## 2022-01-11 VITALS — TEMPERATURE: 97.1 F | SYSTOLIC BLOOD PRESSURE: 147 MMHG | HEART RATE: 81 BPM | DIASTOLIC BLOOD PRESSURE: 88 MMHG

## 2022-01-11 DIAGNOSIS — U07.1 PNEUMONIA DUE TO COVID-19 VIRUS: ICD-10-CM

## 2022-01-11 DIAGNOSIS — I48.91 ATRIAL FIBRILLATION WITH RAPID VENTRICULAR RESPONSE (HCC): ICD-10-CM

## 2022-01-11 DIAGNOSIS — N20.0 KIDNEY STONE ON LEFT SIDE: Primary | ICD-10-CM

## 2022-01-11 DIAGNOSIS — J34.2 DEVIATED NASAL SEPTUM: Primary | ICD-10-CM

## 2022-01-11 DIAGNOSIS — J31.0 CHRONIC RHINITIS: ICD-10-CM

## 2022-01-11 DIAGNOSIS — R04.0 EPISTAXIS: ICD-10-CM

## 2022-01-11 DIAGNOSIS — D35.02 ADRENAL ADENOMA, LEFT: ICD-10-CM

## 2022-01-11 DIAGNOSIS — J12.82 PNEUMONIA DUE TO COVID-19 VIRUS: ICD-10-CM

## 2022-01-11 LAB
BACTERIA: ABNORMAL /HPF
BILIRUBIN URINE: ABNORMAL
BLOOD, URINE: ABNORMAL
CLARITY: ABNORMAL
COLOR: ABNORMAL
COMMENT UA: ABNORMAL
CRYSTALS, UA: ABNORMAL /HPF
EPITHELIAL CELLS, UA: 5 /HPF (ref 0–5)
GLUCOSE URINE: NEGATIVE MG/DL
HYALINE CASTS: 15 /LPF (ref 0–8)
KETONES, URINE: 15 MG/DL
LEUKOCYTE ESTERASE, URINE: ABNORMAL
MICROSCOPIC EXAMINATION: YES
MUCUS: ABNORMAL /LPF
NITRITE, URINE: NEGATIVE
PH UA: 6.5 (ref 5–8)
PROTEIN UA: 100 MG/DL
RBC UA: 782 /HPF (ref 0–4)
SPECIFIC GRAVITY UA: 1.03 (ref 1–1.03)
URINE REFLEX TO CULTURE: ABNORMAL
URINE TYPE: ABNORMAL
UROBILINOGEN, URINE: 0.2 E.U./DL
WBC UA: 7 /HPF (ref 0–5)

## 2022-01-11 PROCEDURE — 99282 EMERGENCY DEPT VISIT SF MDM: CPT

## 2022-01-11 PROCEDURE — 6370000000 HC RX 637 (ALT 250 FOR IP): Performed by: PHYSICIAN ASSISTANT

## 2022-01-11 PROCEDURE — 99243 OFF/OP CNSLTJ NEW/EST LOW 30: CPT | Performed by: STUDENT IN AN ORGANIZED HEALTH CARE EDUCATION/TRAINING PROGRAM

## 2022-01-11 PROCEDURE — 81001 URINALYSIS AUTO W/SCOPE: CPT

## 2022-01-11 PROCEDURE — 74176 CT ABD & PELVIS W/O CONTRAST: CPT

## 2022-01-11 PROCEDURE — 31231 NASAL ENDOSCOPY DX: CPT | Performed by: STUDENT IN AN ORGANIZED HEALTH CARE EDUCATION/TRAINING PROGRAM

## 2022-01-11 RX ORDER — ONDANSETRON 4 MG/1
4 TABLET, ORALLY DISINTEGRATING ORAL EVERY 12 HOURS PRN
Qty: 12 TABLET | Refills: 0 | Status: SHIPPED | OUTPATIENT
Start: 2022-01-11 | End: 2022-01-18 | Stop reason: ALTCHOICE

## 2022-01-11 RX ORDER — TAMSULOSIN HYDROCHLORIDE 0.4 MG/1
0.4 CAPSULE ORAL DAILY
Qty: 30 CAPSULE | Refills: 0 | Status: SHIPPED | OUTPATIENT
Start: 2022-01-11 | End: 2022-04-28

## 2022-01-11 RX ORDER — OXYCODONE HYDROCHLORIDE AND ACETAMINOPHEN 5; 325 MG/1; MG/1
1 TABLET ORAL EVERY 4 HOURS PRN
Qty: 10 TABLET | Refills: 0 | Status: SHIPPED | OUTPATIENT
Start: 2022-01-11 | End: 2022-01-14

## 2022-01-11 RX ORDER — ACETAMINOPHEN 500 MG
1000 TABLET ORAL ONCE
Status: COMPLETED | OUTPATIENT
Start: 2022-01-11 | End: 2022-01-11

## 2022-01-11 RX ADMIN — ACETAMINOPHEN 1000 MG: 500 TABLET ORAL at 20:35

## 2022-01-11 ASSESSMENT — PAIN DESCRIPTION - PAIN TYPE: TYPE: ACUTE PAIN

## 2022-01-11 ASSESSMENT — PAIN DESCRIPTION - FREQUENCY: FREQUENCY: CONTINUOUS

## 2022-01-11 ASSESSMENT — ENCOUNTER SYMPTOMS
NAUSEA: 0
BACK PAIN: 0
SORE THROAT: 0
EYE PAIN: 0
COUGH: 0
SHORTNESS OF BREATH: 0
ABDOMINAL PAIN: 0
VOMITING: 0

## 2022-01-11 ASSESSMENT — PAIN DESCRIPTION - DESCRIPTORS: DESCRIPTORS: ACHING

## 2022-01-11 ASSESSMENT — PAIN SCALES - GENERAL: PAINLEVEL_OUTOF10: 5

## 2022-01-11 ASSESSMENT — PAIN DESCRIPTION - ORIENTATION: ORIENTATION: LEFT;LOWER

## 2022-01-11 ASSESSMENT — PAIN DESCRIPTION - LOCATION: LOCATION: BACK

## 2022-01-11 ASSESSMENT — PAIN DESCRIPTION - ONSET: ONSET: ON-GOING

## 2022-01-11 NOTE — PROGRESS NOTES
[address] [facility] NECK SURGERY  CONSULT      Candis Lou (:  1960) is a 65 y.o. male, here for evaluation of the following chief complaint(s):  Epistaxis (hasn't had one in about a week)      ASSESSMENT/PLAN:  1. Deviated nasal septum  2. Atrial fibrillation with rapid ventricular response ([address]  3. Pneumonia due to COVID-19 virus  4. Chronic rhinitis  5. Epistaxis      This is a very pleasant 65 y.o. male here today for evaluation of the the above-noted complaints. On exam, the patient has evidence of a healing area along the left nasal septum. There is no definitive site of bleeding in his nose to address today during her visit. I had a lengthy conversation with the patient regarding management strategies and prevention strategies to address his epistaxis. I have asked him to continue to use nasal saline gel, adhere to sinus precautions, and to use a humidifier in his room. Handout was provided to the patient. If the patient continues to have epistaxis, then we discussed procedures that could be done to address this. Stop using nasal steroid sprays as this can precipitate and worsen epistaxis. --Vaseline inside of both nostrils along the nasal septum twice daily for the next 10 days  Frequent use of nasal saline gel sprays (preferred) or nasal saline mist multiple times a day in the nose  Consider humidification in the bedroom to help with moisturization of the nose  Avoid nasal trauma including nose picking, harsh nasal blowing, and rubbing nose after blowing. If you develop a bleed, gently blow your nose to clear out any blood clots and then apply multiple sprays of Afrin or oxymetazoline to both nostrils (this helps constrict the blood vessels in the nose and stop bleeding). Apply firm pressure on the soft part of the nose for 15 minutes.   If bleeding persists after this repeat this 2 times, and if there is still bleeding call our office or go to the emergency department for further evaluation. Medical Decision Making: The following items were considered in medical decision making:  Independent review of images  Review / order clinical lab tests  Review / order radiology tests  Decision to obtain old records  Review and summation of old records as accessed through [facility] if applicable    SUBJECTIVE/OBJECTIVE:  [facility]    Deidre Torres is here today for evaluation of issues related to epistaxis. The patient states that he first developed epistaxis in November. He has never had this in the past.  At that time he was diagnosed with A. fib and was placed on Eliquis. Since then he has had 5 nosebleeds. 2 of them of been bad enough that he needed to go to the emergency department. Approximately 2 weeks ago he was in the emergency department for a nosebleed when they cauterized the left side of his nose. He states that the nosebleeds are always on the left. He denies a history of bleeding disorders. He has never been evaluated by an ENT for this before. He gets intermittent nasal obstruction and nasal crusting from his nose. This has been increasing over the last several weeks. He denies loss of sense of smell. REVIEW OF SYSTEMS  The following systems were reviewed and revealed the following in addition to any already discussed in the HPI:    PHYSICAL EXAM    GENERAL: No acute distress, alert and oriented, no hoarseness, strong voice  EYES: EOMI, Anti-icteric  HENT:   Head: Normocephalic and atraumatic.    Face:  Symmetric, facial nerve intact  Right Ear: Normal external ear, normal external auditory canal, intact tympanic membrane with normal mobility and aerated middle ear  Left Ear: Normal external ear, normal external auditory canal, intact tympanic membrane with normal mobility and aerated middle ear  Mouth/Oral Cavity:  normal lips, Uvula is midline, no mucosal lesions, no trismus, normal dentition, normal salivary

## 2022-01-12 ENCOUNTER — OFFICE VISIT (OUTPATIENT)
Dept: CARDIOLOGY CLINIC | Age: 62
End: 2022-01-12

## 2022-01-12 VITALS
SYSTOLIC BLOOD PRESSURE: 118 MMHG | DIASTOLIC BLOOD PRESSURE: 78 MMHG | BODY MASS INDEX: 29.12 KG/M2 | HEIGHT: 71 IN | HEART RATE: 126 BPM | WEIGHT: 208 LBS | OXYGEN SATURATION: 97 %

## 2022-01-12 DIAGNOSIS — I10 ESSENTIAL HYPERTENSION: ICD-10-CM

## 2022-01-12 DIAGNOSIS — I48.19 PERSISTENT ATRIAL FIBRILLATION (HCC): Primary | ICD-10-CM

## 2022-01-12 PROCEDURE — 93000 ELECTROCARDIOGRAM COMPLETE: CPT | Performed by: NURSE PRACTITIONER

## 2022-01-12 PROCEDURE — 99213 OFFICE O/P EST LOW 20 MIN: CPT | Performed by: NURSE PRACTITIONER

## 2022-01-12 RX ORDER — DIGOXIN 125 MCG
125 TABLET ORAL DAILY
Qty: 30 TABLET | Refills: 3 | Status: SHIPPED | OUTPATIENT
Start: 2022-01-12 | End: 2022-01-27 | Stop reason: HOSPADM

## 2022-01-12 ASSESSMENT — ENCOUNTER SYMPTOMS
TROUBLE SWALLOWING: 0
ABDOMINAL PAIN: 0
CONSTIPATION: 0
DIARRHEA: 0
NAUSEA: 0
WHEEZING: 0
SHORTNESS OF BREATH: 1
COLOR CHANGE: 0
VOMITING: 0
SORE THROAT: 0
COUGH: 0
BLOOD IN STOOL: 0
BACK PAIN: 0
SINUS PRESSURE: 0

## 2022-01-12 NOTE — ED PROVIDER NOTES
**ADVANCED PRACTICE PROVIDER, I HAVE EVALUATED THIS PATIENT**        [address]      Pt Name: Gigi Marx  JIROSE MARY:[id number]  Birthdate 1960  Date of evaluation: 1/11/2022  Provider: Lorine Nissen, PA-C      Chief Complaint:    Chief Complaint   Patient presents with    Back Pain     left lower back pain. onset 1030 today. dysuria         Nursing Notes, Past Medical Hx, Past Surgical Hx, Social Hx, Allergies, and Family Hx were all reviewed and agreed with or any disagreements were addressed in the HPI.    HPI: (Location, Duration, Timing, Severity, Quality, Assoc Sx, Context, Modifying factors)    Chief Complaint of left flank pain started around 10:00 this morning. This is a  65 y.o. male who presents to emergency room with complaint of left flank pain. Started about 10:00 this morning. Patient stated it is continuous. He does have a history of kidney stone but he does not know which side it was on. Denies fever, denies any discomfort with urination. He actually states that he has not urinated since this morning. Said he did drink a bottle of water. Denies abdominal pain. No injuries. No numbness or tingling in his feet or fingers. No loss of bowel or urinary control. No previous back injury. No upper back or neck pain. No extremity weakness. No fevers. No other complaints. PastMedical/Surgical History:      Diagnosis Date    COVID-19          Procedure Laterality Date    TONSILLECTOMY      as a child       Medications:  Previous Medications    APIXABAN (ELIQUIS) 5 MG TABS TABLET    Take 1 tablet by mouth 2 times daily    DILTIAZEM (CARDIZEM CD) 120 MG EXTENDED RELEASE CAPSULE    Take 2 capsules by mouth daily    FLECAINIDE (TAMBOCOR) 100 MG TABLET    Take 1 tablet by mouth 2 times daily         Review of Systems:  (2-9 systems needed)  Review of Systems   Constitutional: Negative for chills and fever.    HENT: Negative for congestion and sore throat. Eyes: Negative for pain and visual disturbance. Respiratory: Negative for cough and shortness of breath. Cardiovascular: Negative for chest pain and leg swelling. Gastrointestinal: Negative for abdominal pain, nausea and vomiting. Genitourinary: Positive for flank pain. Negative for dysuria and frequency. Musculoskeletal: Negative for back pain and neck pain. Skin: Negative for rash and wound. Neurological: Negative for dizziness and light-headedness. \"Positives and Pertinent negatives as per HPI\"    Physical Exam:  Physical Exam  Vitals and nursing note reviewed. Cardiovascular:      Rate and Rhythm: Normal rate and regular rhythm. Heart sounds: Normal heart sounds. No murmur heard. No friction rub. No gallop. Pulmonary:      Effort: Pulmonary effort is normal. No respiratory distress. Breath sounds: Normal breath sounds. No wheezing or rales. Chest:      Chest wall: No tenderness. Abdominal:      General: Abdomen is flat. Bowel sounds are normal. There is no distension. Palpations: Abdomen is soft. There is no mass. Tenderness: There is no abdominal tenderness. There is no guarding or rebound. Musculoskeletal:      Cervical back: Normal.      Thoracic back: Tenderness present. No bony tenderness. Lumbar back: No tenderness or bony tenderness. Negative right straight leg raise test and negative left straight leg raise test.        Back:    Neurological:      General: No focal deficit present. Mental Status: He is alert. Sensory: Sensation is intact. Motor: Motor function is intact. Coordination: Coordination is intact. Gait: Gait is intact.          MEDICAL DECISION MAKING    Vitals:    Vitals:    01/11/22 1830 01/11/22 1834   BP:  137/79   Pulse:  100   Resp:  18   Temp:  96.6 °F (35.9 °C)   TempSrc:  Tympanic   SpO2:  97%   Weight: 211 lb 10.3 oz (96 kg)    Height: 5' 11\" (1.803 m) LABS:  Labs Reviewed   URINE RT REFLEX TO CULTURE - Abnormal; Notable for the following components:       Result Value    Clarity, UA CLOUDY (*)     Bilirubin Urine SMALL (*)     Ketones, Urine 15 (*)     Blood, Urine LARGE (*)     Protein,  (*)     Leukocyte Esterase, Urine TRACE (*)     All other components within normal limits    Narrative:     Performed at:  [facility]  [address] [place], [place] [address]   Phone [phone]   MICROSCOPIC URINALYSIS - Abnormal; Notable for the following components:    Mucus, UA 4+ (*)     Bacteria, UA 1+ (*)     Crystals, UA 2+ Ca. Oxalate (*)     Hyaline Casts, UA 15 (*)     WBC, UA 7 (*)     RBC,  (*)     All other components within normal limits    Narrative:     Performed at:  [facility]  [address] [facility] [address]   Phone [phone] of labs reviewed and were negative at this time or not returned at the time of this note. RADIOLOGY:   Non-plain film images such as CT, Ultrasound and MRI are read by the radiologist. Skyler Collins PA-C have directly visualized the radiologic plain film image(s) with the below findings:      Interpretation per the Radiologist below, if available at the time of this note:    CT ABDOMEN PELVIS WO CONTRAST Additional Contrast? None   Final Result   1. 6 mm obstructing stone within the proximal 3rd of the left ureter causing   mild left hydronephrosis. 2.  3 mm nonobstructing stone within the right kidney. 3.  Stable 1.5 cm left adrenal adenoma. 4.  Mild colonic diverticulosis without evidence of diverticulitis              CTA CARDIAC W C STRC MORP W CONTRAST    Result Date: 1/6/2022  EXAMINATION: CTA OF THE CORONARY ARTERIES 1/6/2022 9:49 am TECHNIQUE: Coronary CT angiogram was performed after the bolus administration of intravenous contrast with retrospective cardiac gating.  Multiplanar reformatted images were created on a separate workstation by the radiologist. Dose modulation, iterative reconstruction, and/or weight based adjustment of the mA/kV was utilized to reduce the radiation dose to as low as reasonably achievable. 3D reconstructed images were performed by the 3D Lab workstation. The resultant postprocessed 3D and images are also provided for review. COMPARISON: Prior chest films most recent 11/17/2021 and prior abdomen pelvic CT 07/02/2018 HISTORY: ORDERING SYSTEM PROVIDED HISTORY: PAF (paroxysmal atrial fibrillation) ([facility] [address] TECHNOLOGIST PROVIDED HISTORY: Reason for exam:->pulmonary vein mapping for ablation Reason for Exam: pulmonary vein mapping for ablation, PAF (paroxysmal atrial fibrillation) ([facility]) FINDINGS: There is multifocal airspace disease, with patchy and diffuse distribution throughout both lungs slightly greater on the right than left. Trace right pleural effusion is present. Some of the parenchymal disease appears to be interstitial intermixed with airspace involvement. Airways are unremarkable. No pneumothorax. While there are no prior chest CT scan for comparison, prior radiographs do show bilateral airspace disease with patchy distribution. The heart is borderline enlarged with prominent left atrium and left ventricle. Myocardial thickness is unremarkable. Upper abdomen shows a small low-density lesion 12 mm, towards the dome anterior this is better seen on prior abdomen CT and is unchanged representing simple cyst. And midline. Left atrium and atrial appendage demonstrate contrast enhancement without filling defect to indicate thrombus specifically. Contrast is seen in the coronary arteries with some cardiac motion artifact. There is no pericardial effusion. Right superior pulmonary vein: 26 x 20 Right inferior pulmonary vein: 17 x 18 mm. Left superior pulmonary vein: 19 x 12 mm. Left inferior pulmonary vein:  20 x 16 mm.      Multifocal patchy airspace disease which appears to be airspace and intermixed with interstitial involvement. Prior chest films show similar findings but with different technique. Therefore this could be chronic such as residual from multifocal pneumonia specifically including prior involvement with multifocal COVID pneumonia and resultant developing scarring/fibrosis. . Central pulmonary venous measurements above. MEDICAL DECISION MAKING / ED COURSE:      PROCEDURES:   Procedures    None    Patient was given:  Medications   acetaminophen (TYLENOL) tablet 1,000 mg (1,000 mg Oral Given 1/11/22 2035)         Emergency room course: Patient on exam neck is supple full range of motion without midline tenderness. No nuchal rigidity. No midline to cervical, thoracic or lumbar spine. Does have mild left flank tenderness that radiates to the left lateral side of abdomen. He has no other abdominal tenderness. Distended abdomen. Normal bowel sounds all four quadrants no rebound or guarding noted. Full range of motion in the lower lumbar full lateral bending full rotation full flexion extension without reproducible tenderness. Patient states there is no change. Negative straight leg raise. The strength against resistance plantar and dorsiflexion. No saddle anesthesia. Cardiovascular regular rate rhythm, lungs are clear. No wheeze rales or rhonchi. No chest wall tenderness with palpation. Patient is ambulatory with no difficulty. Urinalysis showed trace leukocytes, negative for nitrites, large blood and 15 ketones. Microscopically bacteria is 1+, 2+ calcium oxalate, hyaline casts 15 WBC is 7 . CT scan shows a 6 mm obstructing stone within the proximal third of the left upper ureter causing mild left hydronephrosis. Also 3 mm nonobstructing stone within the right kidney. Stable 1.5 cm left adrenal adenoma. Placed a call out to urology spoke with Dr. Renan James.   He is okay with put the patient on Flomax, Percocet and nausea medication. Also wanted me to give the patient a 30-day supply of the Flomax. He will have the office give him a call to schedule an appointment. Discussed this with the patient. Patient was instructed return for any worsening. He will be discharged stable condition. The patient tolerated their visit well. I evaluated the patient. The physician was available for consultation as needed. The patient and / or the family were informed of the results of any tests, a time was given to answer questions, a plan was proposed and they agreed with plan. CLINICAL IMPRESSION:  1. Kidney stone on left side    2. Adrenal adenoma, left        DISPOSITION Decision To Discharge 01/11/2022 08:57:19 PM      PATIENT REFERRED TO:  Corrine Moreno MD  [address]  [phone]    Call   As needed    Ba Benitez MD  [address] [place] [address]  [phone]    Call in 1 day        DISCHARGE MEDICATIONS:  New Prescriptions    ONDANSETRON (ZOFRAN ODT) 4 MG DISINTEGRATING TABLET    Take 1 tablet by mouth every 12 hours as needed for Nausea    OXYCODONE-ACETAMINOPHEN (PERCOCET) 5-325 MG PER TABLET    Take 1 tablet by mouth every 4 hours as needed for Pain for up to 3 days.     TAMSULOSIN (FLOMAX) 0.4 MG CAPSULE    Take 1 capsule by mouth daily       DISCONTINUED MEDICATIONS:  Discontinued Medications    No medications on file              (Please note the MDM and HPI sections of this note were completed with a voice recognition program.  Efforts were made to edit the dictations but occasionally words are mis-transcribed.)    Electronically signed, Lorine Nissen, PA-C,          Lorine Nissen, PA-C  01/11/22 1026

## 2022-01-17 ENCOUNTER — OFFICE VISIT (OUTPATIENT)
Dept: FAMILY MEDICINE CLINIC | Age: 62
End: 2022-01-17

## 2022-01-17 ENCOUNTER — TELEPHONE (OUTPATIENT)
Dept: CARDIOLOGY CLINIC | Age: 62
End: 2022-01-17

## 2022-01-17 VITALS
SYSTOLIC BLOOD PRESSURE: 138 MMHG | WEIGHT: 207 LBS | DIASTOLIC BLOOD PRESSURE: 86 MMHG | OXYGEN SATURATION: 98 % | HEIGHT: 71 IN | HEART RATE: 64 BPM | BODY MASS INDEX: 28.98 KG/M2 | TEMPERATURE: 97 F

## 2022-01-17 DIAGNOSIS — I48.91 ATRIAL FIBRILLATION, UNSPECIFIED TYPE (HCC): ICD-10-CM

## 2022-01-17 DIAGNOSIS — N20.1 LEFT URETERAL STONE: ICD-10-CM

## 2022-01-17 DIAGNOSIS — I10 ESSENTIAL HYPERTENSION: Primary | ICD-10-CM

## 2022-01-17 PROCEDURE — 99999 PR OFFICE/OUTPT VISIT,PROCEDURE ONLY: CPT | Performed by: FAMILY MEDICINE

## 2022-01-17 ASSESSMENT — ENCOUNTER SYMPTOMS
TROUBLE SWALLOWING: 0
DIARRHEA: 0
ABDOMINAL DISTENTION: 0
BACK PAIN: 0
COUGH: 0
SORE THROAT: 0
BLOOD IN STOOL: 0
SHORTNESS OF BREATH: 0
ABDOMINAL PAIN: 0
CONSTIPATION: 0
VOICE CHANGE: 0
NAUSEA: 0
VOMITING: 0

## 2022-01-17 NOTE — TELEPHONE ENCOUNTER
Jane Closs is calling in and they just found some kidney stones when he went to the urology group and he is wanting to know if he is able to get those removed before his ablation, or whether he should wait until after completed to get them removed?      He can be reached back at [phone]

## 2022-01-17 NOTE — TELEPHONE ENCOUNTER
Per [facility] CNP note patient should proceed with kidney stone removal prior to ablation in order to be able to hold [address]. If this is completed after ablation he will not be able to hold [address]. Spoke with Vince Alfaro and he is scheduled for procedure with the [facility]rology Group this coming Thursday. Again discussed that it is preferred for this appointment to be done prior to ablation. He v/u and will call with any further concerns.

## 2022-01-17 NOTE — PROGRESS NOTES
Subjective:      Patient ID: Pauline Silva is a 65 y.o. male. Chief Complaint   Patient presents with    Follow-up        Patient presents with:   Follow-up    He is overall well  He has a renal stone and will need to have procedure to have removed  He has on the left side    He is now off O2 and is noting much improved breathing  He has O2 sats in 90's when checked     Declined immunizations    No tobacco use  occ beer     YOB: 1960    Date of Visit:  1/17/2022     -- Amoxicillin    -- Pcn (Penicillins)     Current Outpatient Medications:  digoxin (LANOXIN) 125 MCG tablet, Take 1 tablet by mouth daily, Disp: 30 tablet, Rfl: 3  ondansetron (ZOFRAN ODT) 4 MG disintegrating tablet, Take 1 tablet by mouth every 12 hours as needed for Nausea, Disp: 12 tablet, Rfl: 0  tamsulosin (FLOMAX) 0.4 MG capsule, Take 1 capsule by mouth daily, Disp: 30 capsule, Rfl: 0  apixaban (ELIQUIS) 5 MG TABS tablet, Take 1 tablet by mouth 2 times daily, Disp: 60 tablet, Rfl: 5  dilTIAZem (CARDIZEM CD) 120 MG extended release capsule, Take 2 capsules by mouth daily, Disp: 30 capsule, Rfl: 3    No current facility-administered medications for this visit.    --------------------------               01/17/22                     1305        --------------------------   BP:          138/86        Site:    Left Upper Arm    Position:    Sitting        Cuff Size:  Large Adult      Pulse:         64          Temp:   97 °F (36.1 °C)    TempSrc:    Temporal       SpO2:         98%          Weight: 207 lb (93.9 kg)   Height: 5' 11\" (1.803 m)  --------------------------  Wt Readings from Last 3 Encounters:  01/17/22 : 207 lb (93.9 kg)  01/12/22 : 208 lb (94.3 kg)  01/11/22 : 211 lb 10.3 oz (96 kg)    BP Readings from Last 3 Encounters:  01/17/22 : 138/86  01/12/22 : 118/78  01/11/22 : 134/71    Past Surgical History:  No date: TONSILLECTOMY      Comment:  as a child  No problem with anesthesia    No family hx of anesthesia problems       Review of Systems   Constitutional: Negative for appetite change, chills, fever and unexpected weight change. HENT: Negative for sore throat, trouble swallowing and voice change. Loose tooth bottom right   No front cap or crown   Respiratory: Negative for cough and shortness of breath. Notes some discomfort to breath deep on the right side of chest started today   Seems to be getting better     Cardiovascular: Negative for chest pain, palpitations and leg swelling. Gastrointestinal: Negative for abdominal distention, abdominal pain, blood in stool, constipation, diarrhea, nausea and vomiting. No gerd no dysphagia    Genitourinary: Negative for difficulty urinating, dysuria and frequency. Musculoskeletal: Negative for back pain and neck pain. Skin: Negative for rash. Neurological: Negative for headaches. Hematological: Negative for adenopathy. Does not bruise/bleed easily. No hx of blood clot       Objective:   Physical Exam  Constitutional:       General: He is not in acute distress. Appearance: Normal appearance. He is well-developed. He is not ill-appearing or diaphoretic. HENT:      Head: Normocephalic and atraumatic. Right Ear: Tympanic membrane and ear canal normal.      Left Ear: Tympanic membrane and ear canal normal.      Nose: Nose normal.      Mouth/Throat:      Lips: Pink. Mouth: Mucous membranes are moist. No oral lesions. Pharynx: Oropharynx is clear. Uvula midline. Eyes:      General: No scleral icterus. Pupils: Pupils are equal, round, and reactive to light. Neck:      Thyroid: No thyroid mass or thyromegaly. Cardiovascular:      Rate and Rhythm: Normal rate and regular rhythm. Heart sounds: Normal heart sounds. No murmur heard. No friction rub. No gallop.        Comments:     Pulmonary:      Effort: Pulmonary effort is normal. No tachypnea, accessory muscle usage or respiratory distress. Breath sounds: Normal breath sounds. No decreased breath sounds, wheezing, rhonchi or rales. Chest:   Breasts:      Right: No supraclavicular adenopathy. Left: No supraclavicular adenopathy. Abdominal:      General: Bowel sounds are normal. There is no distension or abdominal bruit. Palpations: Abdomen is soft. There is no hepatomegaly, splenomegaly, mass or pulsatile mass. Tenderness: There is no abdominal tenderness. There is no guarding. Musculoskeletal:      Cervical back: Neck supple. Lymphadenopathy:      Head:      Right side of head: No submental or submandibular adenopathy. Left side of head: No submental or submandibular adenopathy. Cervical: No cervical adenopathy. Upper Body:      Right upper body: No supraclavicular adenopathy. Left upper body: No supraclavicular adenopathy. Skin:     General: Skin is warm and dry. Coloration: Skin is not pale. Nails: There is no clubbing. Neurological:      Mental Status: He is alert. Assessment:        Diagnosis Orders   1. Essential hypertension  [place] NONINVASV OXYGEN SATUR;SINGLE   2.  Atrial fibrillation, unspecified type ([address]  [place] NONINVASV OXYGEN SATUR;SINGLE   3. Left ureteral stone           Stable  Will be having upcoming procedure cystoscopy   Ok for same       Plan:      Do contact the cardiologist about the upcoming procedure for the kidney stone and when to stop the eliquis   See me back other wise in about 4 months   On the morning of the surgery take the lanoxin and the diltiazem with just enough water to get the pills down as soon as you get out of bed   You may get the covid vaccines end of February since you had the I.V. antibodies         Ileana Alicia MD

## 2022-01-17 NOTE — PATIENT INSTRUCTIONS
Do contact the cardiologist about the upcoming procedure for the kidney stone and when to stop the eliquis   See me back other wise in about 4 months   On the morning of the surgery take the lanoxin and the diltiazem with just enough water to get the pills down as soon as you get out of bed   You may get the covid vaccines end of February since you had the I.V. antibodies

## 2022-01-18 ENCOUNTER — HOSPITAL ENCOUNTER (OUTPATIENT)
Dept: PREADMISSION TESTING | Age: 62
Discharge: HOME | End: 2022-01-22

## 2022-01-18 ENCOUNTER — TELEPHONE (OUTPATIENT)
Dept: CARDIOLOGY CLINIC | Age: 62
End: 2022-01-18

## 2022-01-18 DIAGNOSIS — Z01.818 ENCOUNTER FOR PREADMISSION TESTING: Primary | ICD-10-CM

## 2022-01-18 LAB
ANION GAP SERPL CALCULATED.3IONS-SCNC: 13 MMOL/L (ref 3–16)
BUN BLDV-MCNC: 13 MG/DL (ref 7–20)
CALCIUM SERPL-MCNC: 9.1 MG/DL (ref 8.3–10.6)
CHLORIDE BLD-SCNC: 103 MMOL/L (ref 99–110)
CO2: 31 MMOL/L (ref 21–32)
CREAT SERPL-MCNC: 1.2 MG/DL (ref 0.8–1.3)
GFR AFRICAN AMERICAN: >60
GFR NON-AFRICAN AMERICAN: >60
GLUCOSE BLD-MCNC: 115 MG/DL (ref 70–99)
HCT VFR BLD CALC: 42.3 % (ref 40.5–52.5)
HEMOGLOBIN: 14.2 G/DL (ref 13.5–17.5)
MCH RBC QN AUTO: 28 PG (ref 26–34)
MCHC RBC AUTO-ENTMCNC: 33.5 G/DL (ref 31–36)
MCV RBC AUTO: 83.5 FL (ref 80–100)
PDW BLD-RTO: 15.4 % (ref 12.4–15.4)
PLATELET # BLD: 272 K/UL (ref 135–450)
PMV BLD AUTO: 8.8 FL (ref 5–10.5)
POTASSIUM SERPL-SCNC: 2.6 MMOL/L (ref 3.5–5.1)
RBC # BLD: 5.06 M/UL (ref 4.2–5.9)
SODIUM BLD-SCNC: 147 MMOL/L (ref 136–145)
WBC # BLD: 6.3 K/UL (ref 4–11)

## 2022-01-18 PROCEDURE — 85027 COMPLETE CBC AUTOMATED: CPT

## 2022-01-18 PROCEDURE — 80048 BASIC METABOLIC PNL TOTAL CA: CPT

## 2022-01-18 RX ORDER — CIPROFLOXACIN 2 MG/ML
400 INJECTION, SOLUTION INTRAVENOUS ONCE
Status: CANCELLED | OUTPATIENT
Start: 2022-01-20

## 2022-01-18 NOTE — PROGRESS NOTES
C-Difficile admission screening and protocol:       * Admitted with diarrhea? [] YES    [x]  NO     *Prior history of C-Diff. In last 3 months? [] YES    [x]  NO     *Antibiotic use in the past 6-8 weeks? []  NO    [x]  YES                 If yes, which ANTIBIOTIC AND REASON______     *Prior hospitalization or nursing home in the last month? []  YES    [x]  NO      [address]  time_1:55pm___________        Surgery time_3;25pm___________    Take the following medications with a sip of water: Follow your MD/Surgeons pre-procedure instructions regarding your medications    Do not eat or drink anything after 12:00 midnight prior to your surgery. This includes water chewing gum, mints and ice chips. You may brush your teeth and gargle the morning of your surgery, but do not swallow the water     Please see your family doctor/pediatrician for a history and physical and/or concerning medications. Bring any test results/reports from your physicians office. If you are under the care of a heart doctor or specialist doctor, please be aware that you may be asked to them for clearance    You may be asked to stop blood thinners such as Coumadin, Plavix, Fragmin, Lovenox, etc., or any anti-inflammatories such as:  Aspirin, Ibuprofen, Advil, Naproxen prior to your surgery. We also ask that you stop any OTC medications such as fish oil, vitamin E, glucosamine, garlic, Multivitamins, COQ 10, etc. May take tylenol    We ask that you do not smoke 24 hours prior to surgery  We ask that you do not  drink any alcoholic beverages 24 hours prior to surgery     You must make arrangements for a responsible adult to take you home after your surgery. For your safety you will not be allowed to leave alone or drive yourself home. Your surgery will be cancelled if you do not have a ride home.      Also for your safety, it is strongly suggested that someone stay with you the first 24 hours after your surgery. A parent or legal guardian must accompany a child scheduled for surgery and plan to stay at the hospital until the child is discharged. Please do not bring other children with you. For your comfort, please wear simple loose fitting clothing to the hospital.  Please do not bring valuables. Do not wear any make-up or nail polish on your fingers or toes      For your safety, please do not wear any jewelry or body piercing's on the day of surgery. All jewelry must be removed. If you have dentures, they will be removed before going to operating room. For your convenience, we will provide you with a container. If you wear contact lenses or glasses, they will be removed, please bring a case for them. If you have a living will and a durable power of  for healthcare, please bring in a copy. As part of our patient safety program to minimize surgical site infections, we ask you to do the following:    · Please notify your surgeon if you develop any illness between         now and the  day of your surgery. · This includes a cough, cold, fever, sore throat, nausea,         or vomiting, and diarrhea, etc.  ·  Please notify your surgeon if you experience dizziness, shortness         of breath or blurred vision between now and the time of your surgery. Do not shave your operative site 96 hours prior to surgery. For face and neck surgery, men may use an electric razor 48 hours   prior to surgery. You may shower the night before surgery or the morning of   your surgery with an antibacterial soap.     You will need to bring a photo ID and insurance card    [facility] has an onsite pharmacy, would you like to utilize our pharmacy     If you will be staying overnight and use a C-pap machine, please bring   your C-pap to hospital     Our goal is to provide you with excellent care, therefore, visitors will be limited to two(2) in the room at a time so that we may focus on providing this care for you. Please contact pre-admission testing if you have any further questions. [facility] phone number:  [phone] PAT fax number:  [phone]  Please note these are generalized instructions for all surgical cases, you may be provided with more specific instructions according to your surgery. SAFETY FIRST. .call before you fall    Unfortunately due the rise in covid cases, staffing crisis and hospital capacity, your procedure may need to be rescheduled--if this were to happen your Surgeons office will notify you ASAP

## 2022-01-18 NOTE — PROGRESS NOTES
Preoperative Screening for Elective Surgery/Invasive Procedures While COVID-19 present in the community     1. Have you tested positive or have been told to self-isolate for COVID-19 like symptoms within the past 28 days?no-tested positive-11/2021  2. Do you currently have any of the following symptoms?no  ? Fever >100.0 F or 99.9 F in immunocompromised patients? no  ? New onset cough, shortness of breath or difficulty breathing? no  ? New onset sore throat, myalgia (muscle aches and pains), headache, loss of taste/smell or diarrhea? no  3. Have you had a potential exposure to COVID-19 within the past 14 days by:no  ? Close contact with a confirmed case? no  ? Close contact with a healthcare worker,  or essential infrastructure worker (grocery store, [employer], gas station, public utilities or transportation)?no  ? Do you reside in a congregate setting such as; skilled nursing facility, adult home, correctional facility, homeless shelter or other institutional setting? no  ? Have you had recent travel to a known COVID-19 hotspot? no     Indicate if the patient has a positive screen by answering yes to one or more of the above questions.

## 2022-01-18 NOTE — PROGRESS NOTES
No covid test needed prior to surgery-patient tested positive in November of 2021 and is with in the 90 day protocol

## 2022-01-18 NOTE — TELEPHONE ENCOUNTER
Received call back from [address] [place] [address] confirmed receipt of of new RX for Xarelto 20 mg daily.

## 2022-01-18 NOTE — PROGRESS NOTES
PATIENT PCP STATES OK FOR SURGERY PENDING CARDIAC CLEARANCE-OFFICE CLOSED-PATIENT SEES DR. Aaron Perez AND NURSE PRACTITIONER OCTAVIA FAJARDO-FOLLOW UP NURSE TO CALL IN AM FOR Cheli Reed

## 2022-01-18 NOTE — PROGRESS NOTES
Patient saw dr. Pablito Jolly pcp for office visit on 1/17/20200 and per dr. Pablito Jolly he is ok for surgery

## 2022-01-18 NOTE — TELEPHONE ENCOUNTER
[facility] states that Eliquis is not available for them to give to patient. He has enough Eliquis to get him to his ablation, but they are wondering if he could be switched to Xarelto 20mg going forward. It would be at no cost to the patient. Please advise.

## 2022-01-18 NOTE — TELEPHONE ENCOUNTER
Spoke with patient he is agreeable to change to Xarelto. I sent in a new RX to [facility] for Xarelto 20 mg daily. I also called [facility] pharmacy at [phone] ext [phone] to advise on medication change no answer left message instructing to call back to confirm receipt of RX.

## 2022-01-18 NOTE — PROGRESS NOTES
Lab called with abnormal potassium value--potassium level 2.64--Call placed to Dr. Nayely Cazares regarding abnormal lab value on 1/18/22 @ [phone]

## 2022-01-18 NOTE — PROGRESS NOTES
Spoke with Maliha Doran at dr. wilder's office and per dr. Anand Arias patient doesn't have to stop his eloquis-patient aware

## 2022-01-19 ENCOUNTER — ANESTHESIA EVENT (OUTPATIENT)
Dept: OPERATING ROOM | Age: 62
End: 2022-01-19

## 2022-01-19 ENCOUNTER — TELEPHONE (OUTPATIENT)
Dept: NON INVASIVE DIAGNOSTICS | Age: 62
End: 2022-01-19

## 2022-01-19 NOTE — PROGRESS NOTES
Gurinder Thomas from Dr. Delacruz Ax office called to state that she is aware of patient's lab results and spoke with Dr. Bill Hernandez about it. Dr. Bill Hernandez stated that she will take care of it and that PAT doesn't need to do anything.

## 2022-01-19 NOTE — PROGRESS NOTES
Dr. Boggs Kill office called for cardiac clearance per dr. Bills & Kecia shrestha-pcp request-spoke to [facility]

## 2022-01-19 NOTE — TELEPHONE ENCOUNTER
Alison Morris from pre-admission testing called, wanted cardiac \"clearance\" for pt to have a cystoscopy and L ureteroscopy tomorrow. Does not need to hold anticoagulation per urology. Has ablation planned but not until next week, recent cardioversions.

## 2022-01-19 NOTE — TELEPHONE ENCOUNTER
Patient NOT allowed to stop [facility] for 4 weeks after CV. No additional cardiac testing needed prior to cystoscopy. Would recommend that the patient's urologic procedure be done in such a way as to not cause hematuria during the near-future atrial fibrillation ablation, where heparin IV infusion is dosed to -400 sec.

## 2022-01-20 ENCOUNTER — ANESTHESIA (OUTPATIENT)
Dept: OPERATING ROOM | Age: 62
End: 2022-01-20

## 2022-01-20 ENCOUNTER — HOSPITAL ENCOUNTER (OUTPATIENT)
Age: 62
Setting detail: OBSERVATION
Discharge: HOME | End: 2022-01-23
Attending: UROLOGY | Admitting: UROLOGY

## 2022-01-20 DIAGNOSIS — N20.0 CALCULUS OF KIDNEY: ICD-10-CM

## 2022-01-20 LAB
ANION GAP SERPL CALCULATED.3IONS-SCNC: 13 MMOL/L (ref 3–16)
BUN BLDV-MCNC: 10 MG/DL (ref 7–20)
CALCIUM SERPL-MCNC: 8.7 MG/DL (ref 8.3–10.6)
CHLORIDE BLD-SCNC: 106 MMOL/L (ref 99–110)
CO2: 30 MMOL/L (ref 21–32)
CREAT SERPL-MCNC: 1 MG/DL (ref 0.8–1.3)
GFR AFRICAN AMERICAN: >60
GFR NON-AFRICAN AMERICAN: >60
GLUCOSE BLD-MCNC: 95 MG/DL (ref 70–99)
MAGNESIUM: 1.7 MG/DL (ref 1.8–2.4)
POTASSIUM REFLEX MAGNESIUM: 2.4 MMOL/L (ref 3.5–5.1)
POTASSIUM SERPL-SCNC: 2.4 MMOL/L (ref 3.5–5.1)
SODIUM BLD-SCNC: 149 MMOL/L (ref 136–145)

## 2022-01-20 PROCEDURE — G0378 HOSPITAL OBSERVATION PER HR: HCPCS

## 2022-01-20 PROCEDURE — 6360000002 HC RX W HCPCS: Performed by: STUDENT IN AN ORGANIZED HEALTH CARE EDUCATION/TRAINING PROGRAM

## 2022-01-20 PROCEDURE — 83735 ASSAY OF MAGNESIUM: CPT

## 2022-01-20 PROCEDURE — 6370000000 HC RX 637 (ALT 250 FOR IP): Performed by: UROLOGY

## 2022-01-20 PROCEDURE — 84132 ASSAY OF SERUM POTASSIUM: CPT

## 2022-01-20 PROCEDURE — 36415 COLL VENOUS BLD VENIPUNCTURE: CPT

## 2022-01-20 PROCEDURE — 6370000000 HC RX 637 (ALT 250 FOR IP): Performed by: HOSPITALIST

## 2022-01-20 PROCEDURE — 80048 BASIC METABOLIC PNL TOTAL CA: CPT

## 2022-01-20 PROCEDURE — 6360000002 HC RX W HCPCS: Performed by: HOSPITALIST

## 2022-01-20 PROCEDURE — 2580000003 HC RX 258: Performed by: ANESTHESIOLOGY

## 2022-01-20 RX ORDER — TAMSULOSIN HYDROCHLORIDE 0.4 MG/1
0.4 CAPSULE ORAL DAILY
Status: DISCONTINUED | OUTPATIENT
Start: 2022-01-20 | End: 2022-01-23 | Stop reason: HOSPADM

## 2022-01-20 RX ORDER — OXYCODONE HYDROCHLORIDE AND ACETAMINOPHEN 5; 325 MG/1; MG/1
1 TABLET ORAL EVERY 4 HOURS PRN
Status: DISCONTINUED | OUTPATIENT
Start: 2022-01-20 | End: 2022-01-23 | Stop reason: HOSPADM

## 2022-01-20 RX ORDER — HYDRALAZINE HYDROCHLORIDE 20 MG/ML
5 INJECTION INTRAMUSCULAR; INTRAVENOUS EVERY 10 MIN PRN
Status: DISCONTINUED | OUTPATIENT
Start: 2022-01-20 | End: 2022-01-20

## 2022-01-20 RX ORDER — POTASSIUM CHLORIDE 7.45 MG/ML
10 INJECTION INTRAVENOUS ONCE
Status: DISCONTINUED | OUTPATIENT
Start: 2022-01-20 | End: 2022-01-20

## 2022-01-20 RX ORDER — MORPHINE SULFATE 2 MG/ML
2 INJECTION, SOLUTION INTRAMUSCULAR; INTRAVENOUS
Status: DISCONTINUED | OUTPATIENT
Start: 2022-01-20 | End: 2022-01-23 | Stop reason: HOSPADM

## 2022-01-20 RX ORDER — SODIUM CHLORIDE 0.9 % (FLUSH) 0.9 %
5-40 SYRINGE (ML) INJECTION EVERY 12 HOURS SCHEDULED
Status: DISCONTINUED | OUTPATIENT
Start: 2022-01-20 | End: 2022-01-23 | Stop reason: HOSPADM

## 2022-01-20 RX ORDER — POTASSIUM CHLORIDE 7.45 MG/ML
10 INJECTION INTRAVENOUS PRN
Status: DISCONTINUED | OUTPATIENT
Start: 2022-01-20 | End: 2022-01-20

## 2022-01-20 RX ORDER — POTASSIUM CHLORIDE 20 MEQ/1
40 TABLET, EXTENDED RELEASE ORAL ONCE
Status: DISCONTINUED | OUTPATIENT
Start: 2022-01-20 | End: 2022-01-20 | Stop reason: SDUPTHER

## 2022-01-20 RX ORDER — DIGOXIN 125 MCG
125 TABLET ORAL DAILY
Status: DISCONTINUED | OUTPATIENT
Start: 2022-01-21 | End: 2022-01-23 | Stop reason: HOSPADM

## 2022-01-20 RX ORDER — POTASSIUM CHLORIDE 7.45 MG/ML
10 INJECTION INTRAVENOUS PRN
Status: DISCONTINUED | OUTPATIENT
Start: 2022-01-20 | End: 2022-01-23 | Stop reason: HOSPADM

## 2022-01-20 RX ORDER — POTASSIUM CHLORIDE 7.45 MG/ML
10 INJECTION INTRAVENOUS ONCE
Status: COMPLETED | OUTPATIENT
Start: 2022-01-20 | End: 2022-01-20

## 2022-01-20 RX ORDER — FENTANYL CITRATE 50 UG/ML
25 INJECTION, SOLUTION INTRAMUSCULAR; INTRAVENOUS EVERY 5 MIN PRN
Status: DISCONTINUED | OUTPATIENT
Start: 2022-01-20 | End: 2022-01-20

## 2022-01-20 RX ORDER — POTASSIUM CHLORIDE 20 MEQ/1
40 TABLET, EXTENDED RELEASE ORAL PRN
Status: DISCONTINUED | OUTPATIENT
Start: 2022-01-20 | End: 2022-01-23 | Stop reason: HOSPADM

## 2022-01-20 RX ORDER — LABETALOL HYDROCHLORIDE 5 MG/ML
5 INJECTION, SOLUTION INTRAVENOUS EVERY 10 MIN PRN
Status: DISCONTINUED | OUTPATIENT
Start: 2022-01-20 | End: 2022-01-20

## 2022-01-20 RX ORDER — SODIUM CHLORIDE 0.9 % (FLUSH) 0.9 %
10 SYRINGE (ML) INJECTION PRN
Status: DISCONTINUED | OUTPATIENT
Start: 2022-01-20 | End: 2022-01-20 | Stop reason: HOSPADM

## 2022-01-20 RX ORDER — SODIUM CHLORIDE 0.9 % (FLUSH) 0.9 %
10 SYRINGE (ML) INJECTION EVERY 12 HOURS SCHEDULED
Status: DISCONTINUED | OUTPATIENT
Start: 2022-01-20 | End: 2022-01-20 | Stop reason: HOSPADM

## 2022-01-20 RX ORDER — SODIUM CHLORIDE 9 MG/ML
INJECTION, SOLUTION INTRAVENOUS CONTINUOUS
Status: DISCONTINUED | OUTPATIENT
Start: 2022-01-20 | End: 2022-01-20

## 2022-01-20 RX ORDER — ONDANSETRON 2 MG/ML
4 INJECTION INTRAMUSCULAR; INTRAVENOUS
Status: DISCONTINUED | OUTPATIENT
Start: 2022-01-20 | End: 2022-01-20

## 2022-01-20 RX ORDER — SODIUM CHLORIDE 9 MG/ML
25 INJECTION, SOLUTION INTRAVENOUS PRN
Status: DISCONTINUED | OUTPATIENT
Start: 2022-01-20 | End: 2022-01-20

## 2022-01-20 RX ORDER — CIPROFLOXACIN 2 MG/ML
400 INJECTION, SOLUTION INTRAVENOUS ONCE
Status: DISCONTINUED | OUTPATIENT
Start: 2022-01-20 | End: 2022-01-20

## 2022-01-20 RX ORDER — DILTIAZEM HYDROCHLORIDE 120 MG/1
120 CAPSULE, COATED, EXTENDED RELEASE ORAL EVERY 12 HOURS
Status: DISCONTINUED | OUTPATIENT
Start: 2022-01-20 | End: 2022-01-23 | Stop reason: HOSPADM

## 2022-01-20 RX ORDER — POTASSIUM CHLORIDE 20 MEQ/1
40 TABLET, EXTENDED RELEASE ORAL ONCE
Status: COMPLETED | OUTPATIENT
Start: 2022-01-20 | End: 2022-01-20

## 2022-01-20 RX ORDER — SODIUM CHLORIDE 0.9 % (FLUSH) 0.9 %
5-40 SYRINGE (ML) INJECTION PRN
Status: DISCONTINUED | OUTPATIENT
Start: 2022-01-20 | End: 2022-01-23 | Stop reason: HOSPADM

## 2022-01-20 RX ORDER — PROCHLORPERAZINE EDISYLATE 5 MG/ML
5 INJECTION INTRAMUSCULAR; INTRAVENOUS
Status: DISCONTINUED | OUTPATIENT
Start: 2022-01-20 | End: 2022-01-20

## 2022-01-20 RX ORDER — FENTANYL CITRATE 50 UG/ML
50 INJECTION, SOLUTION INTRAMUSCULAR; INTRAVENOUS EVERY 5 MIN PRN
Status: DISCONTINUED | OUTPATIENT
Start: 2022-01-20 | End: 2022-01-20

## 2022-01-20 RX ADMIN — APIXABAN 5 MG: 5 TABLET, FILM COATED ORAL at 21:03

## 2022-01-20 RX ADMIN — POTASSIUM CHLORIDE 10 MEQ: 7.46 INJECTION, SOLUTION INTRAVENOUS at 18:31

## 2022-01-20 RX ADMIN — POTASSIUM CHLORIDE 10 MEQ: 7.46 INJECTION, SOLUTION INTRAVENOUS at 21:03

## 2022-01-20 RX ADMIN — POTASSIUM CHLORIDE 10 MEQ: 7.46 INJECTION, SOLUTION INTRAVENOUS at 21:58

## 2022-01-20 RX ADMIN — DILTIAZEM HYDROCHLORIDE 120 MG: 120 CAPSULE, COATED, EXTENDED RELEASE ORAL at 21:03

## 2022-01-20 RX ADMIN — POTASSIUM CHLORIDE 10 MEQ: 7.46 INJECTION, SOLUTION INTRAVENOUS at 19:49

## 2022-01-20 RX ADMIN — POTASSIUM CHLORIDE 40 MEQ: 1500 TABLET, EXTENDED RELEASE ORAL at 18:27

## 2022-01-20 RX ADMIN — POTASSIUM CHLORIDE 10 MEQ: 7.46 INJECTION, SOLUTION INTRAVENOUS at 15:37

## 2022-01-20 RX ADMIN — SODIUM CHLORIDE: 9 INJECTION, SOLUTION INTRAVENOUS at 14:36

## 2022-01-20 ASSESSMENT — PAIN SCALES - GENERAL: PAINLEVEL_OUTOF10: 0

## 2022-01-20 ASSESSMENT — PAIN - FUNCTIONAL ASSESSMENT: PAIN_FUNCTIONAL_ASSESSMENT: 0-10

## 2022-01-20 ASSESSMENT — LIFESTYLE VARIABLES: SMOKING_STATUS: 0

## 2022-01-20 NOTE — PROGRESS NOTES
Potassium 2.4 on preop labs. Procedure postponed at this time. IV potassium ordered. Patient reports recent discontinuation of flecainide, replaced with digoxin by his cardiologist. (Of note, was on oral potassium supplement in past, ~7 years ago.) Will attempt to admit and correct overnight, plan to reschedule for tomorrow morning at 07:30. Discussed with Dr. Rylee Hill, who will attempt to directly admit.

## 2022-01-20 NOTE — INTERVAL H&P NOTE
Update History & Physical    The patient's History and Physical was reviewed with the patient and I examined the patient. There was no change. The surgical site was confirmed by the patient and me. Plan: The risks, benefits, expected outcome, and alternative to the recommended procedure have been discussed with the patient. Patient understands and wants to proceed with the procedure.      Electronically signed by Ludwig Harvey MD on 1/20/2022 at 2:13 PM

## 2022-01-20 NOTE — PROGRESS NOTES
Patient arrived on the floor, alert and oriented x4, VSS. Patient denies n/v, diarrhea, SOB, pain at this time. Patient is UAL and tolerating well. Oriented patient to the room and call light, answered all questions. Bed in lowest and locked position. Patient has non-slip socks on and call light within reach. Will continue to monitor.

## 2022-01-20 NOTE — PROGRESS NOTES
Patient's pre op potassium is 2.4. He denies symptoms. Potassium was 4.8 on 11/22/21.       He recently stopped flecainide and started digoxin per his cardiologist      Plan to delay procedure for tonight    He will need potassium replacement tonight and then reschedule procedure for tomorrow morning     I will consult hospitalist for assistance with cause / correction of his hypokalemia

## 2022-01-20 NOTE — ANESTHESIA PRE PROCEDURE
Department of Anesthesiology  Preprocedure Note       Name:  Matty Mckeon   Age:  65 y.o.  :  1960                                          MRN:  [id number]         Date:  2022      Surgeon: Kamala Harris):  Caleb Kee MD    Procedure: Procedure(s):  CYSTOSCOPY, LEFT URETEROSCOPY, HOLMIUM LASER, STONE MANIPULATION  LEFT STENT PLACEMENT    Medications prior to admission:   Prior to Admission medications    Medication Sig Start Date End Date Taking?  Authorizing Provider   apixaban (ELIQUIS) 5 MG TABS tablet Take 5 mg by mouth 2 times daily   Yes Historical Provider, MD   digoxin (LANOXIN) 125 MCG tablet Take 1 tablet by mouth daily 22  Yes BALAJI Holloway CNP   tamsulosin (FLOMAX) 0.4 MG capsule Take 1 capsule by mouth daily 1/11/22 2/10/22 Yes Rae Ogden PA-C   dilTIAZem (CARDIZEM CD) 120 MG extended release capsule Take 2 capsules by mouth daily  Patient taking differently: Take 120 mg by mouth every 12 hours  21  Yes Thomas Perkins MD   rivaroxaban Margot Batista) 20 MG TABS tablet Take 1 tablet by mouth Daily with supper 22   Thomas Perkins MD       Current medications:    Current Facility-Administered Medications   Medication Dose Route Frequency Provider Last Rate Last Admin    0.9 % sodium chloride infusion   IntraVENous Continuous Mandy Villalobos MD        sodium chloride flush 0.9 % injection 10 mL  10 mL IntraVENous 2 times per day Mandy Villalobos MD        sodium chloride flush 0.9 % injection 10 mL  10 mL IntraVENous PRN Mandy Villalobos MD        0.9 % sodium chloride infusion  25 mL IntraVENous PRN Mandy Villalobos MD        ciprofloxacin (CIPRO) IVPB 400 mg  400 mg IntraVENous Once Caleb Kee MD        fentaNYL (SUBLIMAZE) injection 25 mcg  25 mcg IntraVENous Q5 Min PRN Chrissie Montague MD        fentaNYL (SUBLIMAZE) injection 50 mcg  50 mcg IntraVENous Q5 Min PRN Chrissie Montague MD        ondansetron [facility]) injection 4 mg  4 mg IntraVENous Once PRN Chrissie Montague MD        prochlorperazine (COMPAZINE) injection 5 mg  5 mg IntraVENous Once PRN Chrissie Montague MD        labetalol (NORMODYNE;TRANDATE) injection 5 mg  5 mg IntraVENous Q10 Min PRN Chrissie Montague MD        hydrALAZINE (APRESOLINE) injection 5 mg  5 mg IntraVENous Q10 Min PRN Chrissie Montague MD        HYDROmorphone (DILAUDID) injection 0.5 mg  0.5 mg IntraVENous Q5 Min PRN Chrissie Montague MD           Allergies:     Allergies   Allergen Reactions    Amoxicillin Other (See Comments)     Groin area extremely red-\"on fire\"-skin peels off from neck to feet-over 2 weeks    Pcn [Penicillins] Other (See Comments)     Groin area extremely red and skin peels off from neck to feet over 2 weeks       Problem List:    Patient Active Problem List   Diagnosis Code    Hypokalemia E87.6    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    Atrial fibrillation with rapid ventricular response (HCC) I48.91    Pneumonia due to COVID-19 virus U07.1, J12.82    Essential hypertension I10    Acute respiratory failure with hypoxia ([facility] [address] J96.01       Past Medical History:        Diagnosis Date    A-fib ([facility] [address]     Broken teeth     COVID-19     Hypertension     Kidney stones        Past Surgical History:        Procedure Laterality Date    CARDIOVERSION      x2    TONSILLECTOMY      as a child       Social History:    Social History     Tobacco Use    Smoking status: Never Smoker    Smokeless tobacco: Never Used   Substance Use Topics    Alcohol use: Yes     Comment: seldom                                Counseling given: Not Answered      Vital Signs (Current):   Vitals:    01/18/22 1014 01/20/22 1415   BP:  (!) 145/91   Pulse:  84   Resp:  16   Temp:  97.8 °F (36.6 °C)   TempSrc:  Temporal   SpO2:  98%   Weight: 207 lb (93.9 kg) 203 lb 0.7 oz (92.1 kg)   Height: 5' 11\" (1.803 m) 5' 11\" (1.803 m)                                              BP Readings from Last 3 Encounters:   01/20/22 (!) 145/91   01/17/22 138/86   01/12/22 118/78       NPO Status: Time of last liquid consumption: 2200                        Time of last solid consumption: 2000                        Date of last liquid consumption: 01/19/22                        Date of last solid food consumption: 01/19/22    BMI:   Wt Readings from Last 3 Encounters:   01/20/22 203 lb 0.7 oz (92.1 kg)   01/17/22 207 lb (93.9 kg)   01/12/22 208 lb (94.3 kg)     Body mass index is 28.32 kg/m². CBC:   Lab Results   Component Value Date    WBC 6.3 01/18/2022    RBC 5.06 01/18/2022    HGB 14.2 01/18/2022    HCT 42.3 01/18/2022    MCV 83.5 01/18/2022    RDW 15.4 01/18/2022     01/18/2022       CMP:   Lab Results   Component Value Date     01/18/2022    K 2.6 01/18/2022    K 4.8 11/22/2021     01/18/2022    CO2 31 01/18/2022    BUN 13 01/18/2022    CREATININE 1.2 01/18/2022    GFRAA >60 01/18/2022    AGRATIO 1.4 03/18/2020    LABGLOM >60 01/18/2022    GLUCOSE 115 01/18/2022    PROT 5.4 11/22/2021    CALCIUM 9.1 01/18/2022    BILITOT 0.5 11/22/2021    ALKPHOS 65 11/22/2021    AST 20 11/22/2021    ALT 65 11/22/2021       POC Tests: No results for input(s): POCGLU, POCNA, POCK, POCCL, POCBUN, POCHEMO, POCHCT in the last 72 hours.     Coags:   Lab Results   Component Value Date    PROTIME 11.5 08/09/2015    INR 1.06 08/09/2015    APTT 25.4 08/09/2015       HCG (If Applicable): No results found for: PREGTESTUR, PREGSERUM, HCG, HCGQUANT     ABGs: No results found for: PHART, PO2ART, BSZ1HEC, OUY2DVK, BEART, C5GXYTQW     Type & Screen (If Applicable):  No results found for: LABABO, LABRH    Drug/Infectious Status (If Applicable):  No results found for: HIV, HEPCAB    COVID-19 Screening (If Applicable): No results found for: COVID19        Anesthesia Evaluation   no history of anesthetic complications:   Airway: Mallampati: II  TM distance: >3 FB   Neck ROM: full  Mouth opening: > = 3 FB Dental:      Comment: Very poor dentition h/o broken teeth, numerous small chips, several teeth chipped at gumline - right lower premolar mildly loose    Pulmonary: breath sounds clear to auscultation      (-) COPD, asthma, rhonchi, rales and not a current smoker                           Cardiovascular:  Exercise tolerance: good (>4 METS),   (+) hypertension:, dysrhythmias (s/p cardioverison x 2; taking digoxin + cardizem): atrial fibrillation, peripheral edema (1+ chronic, equal bilaterally), hyperlipidemia          Rate: normal                    Neuro/Psych:      (-) seizures, TIA and CVA           GI/Hepatic/Renal:   (+) renal disease: kidney stones,      (-) liver disease      ROS comment: CT Abdomen/Pelvis:  1. 6 mm obstructing stone within the proximal 3rd of the left ureter causing mild left hydronephrosis. 2.  3 mm nonobstructing stone within the right kidney. 3.  Stable 1.5 cm left adrenal adenoma. 4.  Mild colonic diverticulosis without evidence of diverticulitis  . Endo/Other:    (+) blood dyscrasia (NOAC was not held): anticoagulation therapy:., electrolyte abnormalities (hypokalemia 1/18/22), .    (-) diabetes mellitus, hypothyroidism, hyperthyroidism               Abdominal:         (-) obese Abdomen: soft. Vascular: Other Findings:           Anesthesia Plan      general     ASA 2     (NPO appropriate; denies active nausea / GERD.)  Induction: intravenous. MIPS: Postoperative opioids intended and Prophylactic antiemetics administered. Anesthetic plan and risks discussed with patient. Use of blood products discussed with patient whom consented to blood products. Plan discussed with CRNA. This pre-anesthesia assessment may be used as a history and physical.    DOS STAFF ADDENDUM:    Pt seen and examined, chart reviewed (including anesthesia, drug and allergy history). No interval changes to history and physical examination.   Anesthetic plan, risks, benefits, alternatives, and personnel involved discussed with patient. Patient verbalized an understanding and agrees to proceed.       Ronnie Damian MD  January 20, 2022  2:24 PM

## 2022-01-20 NOTE — H&P
Hospitalist Service    CONSULT  H&P      Reason for consult: Hypokalemia    Requesting Physician: Renee Lazo      History Obtained From:  patient    HISTORY OF PRESENT ILLNESS:    The patient is a 65 y.o. male with a PMH of atrial fibrillation for which patient is chronically anticoagulated. Patient has history of hypertension and recent history of COVID-19. Infection. Patient was brought to the hospital for a 6 mm obstructing stone within the proximal third of the left ureter causing mild left hydronephrosis. Patient's blood work showed a significant hypokalemia. Medical team is consulted to manage hypokalemia. Past Medical History:        Diagnosis Date    A-fib ([facility] [address]     Broken teeth     COVID-19     Hypertension     Kidney stones        Past Surgical History:        Procedure Laterality Date    CARDIOVERSION      x2    TONSILLECTOMY      as a child       Medications Prior to Admission:    Medications Prior to Admission: apixaban (ELIQUIS) 5 MG TABS tablet, Take 5 mg by mouth 2 times daily  digoxin (LANOXIN) 125 MCG tablet, Take 1 tablet by mouth daily  tamsulosin (FLOMAX) 0.4 MG capsule, Take 1 capsule by mouth daily  dilTIAZem (CARDIZEM CD) 120 MG extended release capsule, Take 2 capsules by mouth daily (Patient taking differently: Take 120 mg by mouth every 12 hours )  rivaroxaban (XARELTO) 20 MG TABS tablet, Take 1 tablet by mouth Daily with supper    Allergies:  Amoxicillin and Pcn [penicillins]    Social History:   TOBACCO:   reports that he has never smoked. He has never used smokeless tobacco.  ETOH:   reports current alcohol use. Family History:       Problem Relation Age of Onset    Diabetes Mother     High Blood Pressure Mother     Heart Disease Brother            REVIEW OF SYSTEMS: positive findings are in bold.     CONSTITUTIONAL:       Recent weight changes, fatigue, fever,chills or night sweats  EYES:      blurriness, acute change in vision, double vision  EARS:      hearing loss, vertigo, discharge,  earache. NOSE:    Rhinorrhea, sneezing, epistaxis. MOUTH/THROAT:       sore throat, mouth ulcers    RESPIRATORY:      SOB, wheezing,  cough, sputum, hemoptysis  CARDIOVASCULAR       chest pain, palpitations, dyspnea on exercise, Lower extrimity edema (swelling)  GASTROINTESTINAL:      Poor appetite,  Nausea, Vomiting, diarrhea, heartburn, abdominal pain. GENITOURINARY:     Urinary frequency, hesitancy,  urgency, Dysuria, hematuria,  Incontinence. MUSCULOSKELETAL:    Myalgias, joint swelling or stiffness, balance problems, low back pain. NEUROLOGICAL:    memory changes,  Headaches. Gait problems. Tremor. SKIN :      Rashes, ulcers, concerning lesions. PHYSICAL EXAM:  /79   Pulse 87   Temp 98.1 °F (36.7 °C) (Oral)   Resp 17   Ht 5' 11\" (1.803 m)   Wt 203 lb 0.7 oz (92.1 kg)   SpO2 (!) 36%   BMI 28.32 kg/m²   General appearance: alert, appears stated age, cooperative and no distress  Head: Normocephalic, without obvious abnormality, atraumatic  Eyes: conjunctivae/corneas clear. PERRL, EOM's intact.    Neck: no adenopathy, no carotid bruit, no JVD, supple, symmetrical, trachea midline and thyroid not enlarged, symmetric, no tenderness/mass/nodules  Lungs: clear to auscultation bilaterally,no rales or wheezes   Heart: regular rate and rhythm, S1, S2 normal, no murmur, click, rub or gallop, regular rate and rhythm   Abdomen:soft, non-tender; non-distended normal bowel sounds no masses, no organomegaly  Extremities:Pedal edema Trace  no sign of DVT  Skin: Skin color, texture, turgor normal. No rashes or lesions  Lymphatic: No palpable lymph node enlargment  Neurologic: Alert and oriented X 3, Mental status: Alert, oriented, thought content appropriate      CBC with Differential:    Lab Results   Component Value Date    WBC 6.3 01/18/2022    RBC 5.06 01/18/2022    HGB 14.2 01/18/2022    HCT 42.3 01/18/2022     01/18/2022    MCV 83.5 01/18/2022    LYMPHOPCT 2.5 11/22/2021     BMP:    Lab Results   Component Value Date     01/20/2022    K 2.4 01/20/2022    K 2.4 01/20/2022     01/20/2022    CO2 30 01/20/2022    BUN 10 01/20/2022    CREATININE 1.0 01/20/2022    CALCIUM 8.7 01/20/2022    GFRAA >60 01/20/2022    LABGLOM >60 01/20/2022    GLUCOSE 95 01/20/2022     PT/INR:  No results found for: PTINR      ASSESSMENT:  PLAN:    Active Problems:    Hypokalemia  Resolved Problems:    * No resolved hospital problems. *    Hypokalemia  E87.6  Start patient on a hypokalemia oral IV protocol. Check potassium later today and in a.m. Atrial fibrillation I48.91  Heart rate is controlled  Continue patient on eliquis      Left hydronephrosis  Due to obstructing ureteric stone  Plan is for stone extraction and stent placement in a.m. Many thanks for this consult. Will follow along with you. Lorena Erickson MD M.D  This note was transcribed using [id number] [employer]. Please disregard any translational errors. Unknown

## 2022-01-21 ENCOUNTER — ANESTHESIA (OUTPATIENT)
Dept: OPERATING ROOM | Age: 62
End: 2022-01-21

## 2022-01-21 ENCOUNTER — ANESTHESIA EVENT (OUTPATIENT)
Dept: OPERATING ROOM | Age: 62
End: 2022-01-21

## 2022-01-21 VITALS
RESPIRATION RATE: 28 BRPM | SYSTOLIC BLOOD PRESSURE: 166 MMHG | OXYGEN SATURATION: 99 % | DIASTOLIC BLOOD PRESSURE: 87 MMHG

## 2022-01-21 LAB
A/G RATIO: 1.1 (ref 1.1–2.2)
ALBUMIN SERPL-MCNC: 3.4 G/DL (ref 3.4–5)
ALP BLD-CCNC: 77 U/L (ref 40–129)
ALT SERPL-CCNC: 23 U/L (ref 10–40)
ANION GAP SERPL CALCULATED.3IONS-SCNC: 12 MMOL/L (ref 3–16)
ANION GAP SERPL CALCULATED.3IONS-SCNC: 13 MMOL/L (ref 3–16)
AST SERPL-CCNC: 24 U/L (ref 15–37)
BILIRUB SERPL-MCNC: 0.7 MG/DL (ref 0–1)
BUN BLDV-MCNC: 8 MG/DL (ref 7–20)
BUN BLDV-MCNC: 8 MG/DL (ref 7–20)
CALCIUM SERPL-MCNC: 8.2 MG/DL (ref 8.3–10.6)
CALCIUM SERPL-MCNC: 8.2 MG/DL (ref 8.3–10.6)
CHLORIDE BLD-SCNC: 106 MMOL/L (ref 99–110)
CHLORIDE BLD-SCNC: 107 MMOL/L (ref 99–110)
CO2: 24 MMOL/L (ref 21–32)
CO2: 24 MMOL/L (ref 21–32)
CREAT SERPL-MCNC: 0.8 MG/DL (ref 0.8–1.3)
CREAT SERPL-MCNC: 0.8 MG/DL (ref 0.8–1.3)
DIGOXIN LEVEL: 0.6 NG/ML (ref 0.8–2)
GFR AFRICAN AMERICAN: >60
GFR AFRICAN AMERICAN: >60
GFR NON-AFRICAN AMERICAN: >60
GFR NON-AFRICAN AMERICAN: >60
GLUCOSE BLD-MCNC: 118 MG/DL (ref 70–99)
GLUCOSE BLD-MCNC: 120 MG/DL (ref 70–99)
HCT VFR BLD CALC: 42.5 % (ref 40.5–52.5)
HEMOGLOBIN: 14.2 G/DL (ref 13.5–17.5)
MAGNESIUM: 1.6 MG/DL (ref 1.8–2.4)
MAGNESIUM: 1.6 MG/DL (ref 1.8–2.4)
MAGNESIUM: 2.3 MG/DL (ref 1.8–2.4)
MCH RBC QN AUTO: 28.1 PG (ref 26–34)
MCHC RBC AUTO-ENTMCNC: 33.4 G/DL (ref 31–36)
MCV RBC AUTO: 84.1 FL (ref 80–100)
PDW BLD-RTO: 15.3 % (ref 12.4–15.4)
PLATELET # BLD: 270 K/UL (ref 135–450)
PMV BLD AUTO: 9.3 FL (ref 5–10.5)
POTASSIUM REFLEX MAGNESIUM: 2.7 MMOL/L (ref 3.5–5.1)
POTASSIUM REFLEX MAGNESIUM: 2.8 MMOL/L (ref 3.5–5.1)
POTASSIUM SERPL-SCNC: 2.8 MMOL/L (ref 3.5–5.1)
POTASSIUM SERPL-SCNC: 3.3 MMOL/L (ref 3.5–5.1)
RBC # BLD: 5.05 M/UL (ref 4.2–5.9)
SODIUM BLD-SCNC: 142 MMOL/L (ref 136–145)
SODIUM BLD-SCNC: 144 MMOL/L (ref 136–145)
TOTAL PROTEIN: 6.4 G/DL (ref 6.4–8.2)
WBC # BLD: 7.6 K/UL (ref 4–11)

## 2022-01-21 PROCEDURE — 6360000002 HC RX W HCPCS: Performed by: INTERNAL MEDICINE

## 2022-01-21 PROCEDURE — 2580000003 HC RX 258: Performed by: UROLOGY

## 2022-01-21 PROCEDURE — G0378 HOSPITAL OBSERVATION PER HR: HCPCS

## 2022-01-21 PROCEDURE — 6360000002 HC RX W HCPCS: Performed by: NURSE ANESTHETIST, CERTIFIED REGISTERED

## 2022-01-21 PROCEDURE — 6370000000 HC RX 637 (ALT 250 FOR IP): Performed by: INTERNAL MEDICINE

## 2022-01-21 PROCEDURE — 2709999900 HC NON-CHARGEABLE SUPPLY: Performed by: UROLOGY

## 2022-01-21 PROCEDURE — 7100000000 HC PACU RECOVERY - FIRST 15 MIN: Performed by: UROLOGY

## 2022-01-21 PROCEDURE — 6370000000 HC RX 637 (ALT 250 FOR IP): Performed by: UROLOGY

## 2022-01-21 PROCEDURE — 2500000003 HC RX 250 WO HCPCS: Performed by: NURSE ANESTHETIST, CERTIFIED REGISTERED

## 2022-01-21 PROCEDURE — 80162 ASSAY OF DIGOXIN TOTAL: CPT

## 2022-01-21 PROCEDURE — 83735 ASSAY OF MAGNESIUM: CPT

## 2022-01-21 PROCEDURE — 2500000003 HC RX 250 WO HCPCS

## 2022-01-21 PROCEDURE — 3700000000 HC ANESTHESIA ATTENDED CARE: Performed by: UROLOGY

## 2022-01-21 PROCEDURE — 3600000014 HC SURGERY LEVEL 4 ADDTL 15MIN: Performed by: UROLOGY

## 2022-01-21 PROCEDURE — 80053 COMPREHEN METABOLIC PANEL: CPT

## 2022-01-21 PROCEDURE — 7100000001 HC PACU RECOVERY - ADDTL 15 MIN: Performed by: UROLOGY

## 2022-01-21 PROCEDURE — 6360000002 HC RX W HCPCS: Performed by: UROLOGY

## 2022-01-21 PROCEDURE — 84132 ASSAY OF SERUM POTASSIUM: CPT

## 2022-01-21 PROCEDURE — 82365 CALCULUS SPECTROSCOPY: CPT

## 2022-01-21 PROCEDURE — C1769 GUIDE WIRE: HCPCS | Performed by: UROLOGY

## 2022-01-21 PROCEDURE — 36415 COLL VENOUS BLD VENIPUNCTURE: CPT

## 2022-01-21 PROCEDURE — 3700000001 HC ADD 15 MINUTES (ANESTHESIA): Performed by: UROLOGY

## 2022-01-21 PROCEDURE — 3600000004 HC SURGERY LEVEL 4 BASE: Performed by: UROLOGY

## 2022-01-21 PROCEDURE — 6360000002 HC RX W HCPCS: Performed by: ANESTHESIOLOGY

## 2022-01-21 PROCEDURE — 88300 SURGICAL PATH GROSS: CPT

## 2022-01-21 PROCEDURE — 2580000003 HC RX 258: Performed by: NURSE ANESTHETIST, CERTIFIED REGISTERED

## 2022-01-21 PROCEDURE — 6360000002 HC RX W HCPCS: Performed by: HOSPITALIST

## 2022-01-21 PROCEDURE — C2617 STENT, NON-COR, TEM W/O DEL: HCPCS | Performed by: UROLOGY

## 2022-01-21 PROCEDURE — 85027 COMPLETE CBC AUTOMATED: CPT

## 2022-01-21 PROCEDURE — 2720000010 HC SURG SUPPLY STERILE: Performed by: UROLOGY

## 2022-01-21 DEVICE — STENT URET 6FR L26CM PERCFLX HYDR+ TAPR TIP GRAD: Type: IMPLANTABLE DEVICE | Site: URETER | Status: FUNCTIONAL

## 2022-01-21 RX ORDER — MEPERIDINE HYDROCHLORIDE 25 MG/ML
12.5 INJECTION INTRAMUSCULAR; INTRAVENOUS; SUBCUTANEOUS
Status: DISCONTINUED | OUTPATIENT
Start: 2022-01-21 | End: 2022-01-21 | Stop reason: HOSPADM

## 2022-01-21 RX ORDER — MIDAZOLAM HYDROCHLORIDE 1 MG/ML
INJECTION INTRAMUSCULAR; INTRAVENOUS PRN
Status: DISCONTINUED | OUTPATIENT
Start: 2022-01-21 | End: 2022-01-21 | Stop reason: SDUPTHER

## 2022-01-21 RX ORDER — POTASSIUM CHLORIDE 20 MEQ/1
40 TABLET, EXTENDED RELEASE ORAL EVERY 8 HOURS SCHEDULED
Status: COMPLETED | OUTPATIENT
Start: 2022-01-21 | End: 2022-01-22

## 2022-01-21 RX ORDER — POTASSIUM CHLORIDE 7.45 MG/ML
20 INJECTION INTRAVENOUS
Status: DISCONTINUED | OUTPATIENT
Start: 2022-01-21 | End: 2022-01-21

## 2022-01-21 RX ORDER — FENTANYL CITRATE 50 UG/ML
25 INJECTION, SOLUTION INTRAMUSCULAR; INTRAVENOUS EVERY 5 MIN PRN
Status: COMPLETED | OUTPATIENT
Start: 2022-01-21 | End: 2022-01-21

## 2022-01-21 RX ORDER — DEXAMETHASONE SODIUM PHOSPHATE 4 MG/ML
INJECTION, SOLUTION INTRA-ARTICULAR; INTRALESIONAL; INTRAMUSCULAR; INTRAVENOUS; SOFT TISSUE PRN
Status: DISCONTINUED | OUTPATIENT
Start: 2022-01-21 | End: 2022-01-21 | Stop reason: SDUPTHER

## 2022-01-21 RX ORDER — ROCURONIUM BROMIDE 10 MG/ML
INJECTION, SOLUTION INTRAVENOUS PRN
Status: DISCONTINUED | OUTPATIENT
Start: 2022-01-21 | End: 2022-01-21 | Stop reason: SDUPTHER

## 2022-01-21 RX ORDER — POTASSIUM CHLORIDE 7.45 MG/ML
10 INJECTION INTRAVENOUS
Status: DISCONTINUED | OUTPATIENT
Start: 2022-01-21 | End: 2022-01-21

## 2022-01-21 RX ORDER — LIDOCAINE HYDROCHLORIDE 20 MG/ML
INJECTION, SOLUTION EPIDURAL; INFILTRATION; INTRACAUDAL; PERINEURAL PRN
Status: DISCONTINUED | OUTPATIENT
Start: 2022-01-21 | End: 2022-01-21 | Stop reason: SDUPTHER

## 2022-01-21 RX ORDER — ONDANSETRON 2 MG/ML
4 INJECTION INTRAMUSCULAR; INTRAVENOUS
Status: DISCONTINUED | OUTPATIENT
Start: 2022-01-21 | End: 2022-01-21 | Stop reason: HOSPADM

## 2022-01-21 RX ORDER — MAGNESIUM SULFATE 1 G/100ML
1000 INJECTION INTRAVENOUS PRN
Status: DISCONTINUED | OUTPATIENT
Start: 2022-01-21 | End: 2022-01-23 | Stop reason: HOSPADM

## 2022-01-21 RX ORDER — CIPROFLOXACIN 2 MG/ML
INJECTION, SOLUTION INTRAVENOUS PRN
Status: DISCONTINUED | OUTPATIENT
Start: 2022-01-21 | End: 2022-01-21 | Stop reason: SDUPTHER

## 2022-01-21 RX ORDER — SUCCINYLCHOLINE/SOD CL,ISO/PF 200MG/10ML
SYRINGE (ML) INTRAVENOUS PRN
Status: DISCONTINUED | OUTPATIENT
Start: 2022-01-21 | End: 2022-01-21 | Stop reason: SDUPTHER

## 2022-01-21 RX ORDER — FENTANYL CITRATE 50 UG/ML
50 INJECTION, SOLUTION INTRAMUSCULAR; INTRAVENOUS EVERY 5 MIN PRN
Status: DISCONTINUED | OUTPATIENT
Start: 2022-01-21 | End: 2022-01-21 | Stop reason: HOSPADM

## 2022-01-21 RX ORDER — MAGNESIUM SULFATE IN WATER 40 MG/ML
2000 INJECTION, SOLUTION INTRAVENOUS ONCE
Status: DISCONTINUED | OUTPATIENT
Start: 2022-01-21 | End: 2022-01-23 | Stop reason: HOSPADM

## 2022-01-21 RX ORDER — PROMETHAZINE HYDROCHLORIDE 25 MG/ML
6.25 INJECTION, SOLUTION INTRAMUSCULAR; INTRAVENOUS
Status: DISCONTINUED | OUTPATIENT
Start: 2022-01-21 | End: 2022-01-21 | Stop reason: HOSPADM

## 2022-01-21 RX ORDER — MAGNESIUM HYDROXIDE 1200 MG/15ML
LIQUID ORAL
Status: COMPLETED | OUTPATIENT
Start: 2022-01-21 | End: 2022-01-21

## 2022-01-21 RX ORDER — HYDROCODONE BITARTRATE AND ACETAMINOPHEN 5; 325 MG/1; MG/1
1 TABLET ORAL PRN
Status: DISCONTINUED | OUTPATIENT
Start: 2022-01-21 | End: 2022-01-21 | Stop reason: HOSPADM

## 2022-01-21 RX ORDER — SODIUM CHLORIDE 9 MG/ML
INJECTION, SOLUTION INTRAVENOUS CONTINUOUS PRN
Status: DISCONTINUED | OUTPATIENT
Start: 2022-01-21 | End: 2022-01-21 | Stop reason: SDUPTHER

## 2022-01-21 RX ORDER — FENTANYL CITRATE 50 UG/ML
INJECTION, SOLUTION INTRAMUSCULAR; INTRAVENOUS PRN
Status: DISCONTINUED | OUTPATIENT
Start: 2022-01-21 | End: 2022-01-21 | Stop reason: SDUPTHER

## 2022-01-21 RX ORDER — PROPOFOL 10 MG/ML
INJECTION, EMULSION INTRAVENOUS PRN
Status: DISCONTINUED | OUTPATIENT
Start: 2022-01-21 | End: 2022-01-21 | Stop reason: SDUPTHER

## 2022-01-21 RX ORDER — HYDRALAZINE HYDROCHLORIDE 20 MG/ML
5 INJECTION INTRAMUSCULAR; INTRAVENOUS EVERY 10 MIN PRN
Status: DISCONTINUED | OUTPATIENT
Start: 2022-01-21 | End: 2022-01-21 | Stop reason: HOSPADM

## 2022-01-21 RX ORDER — ONDANSETRON 2 MG/ML
INJECTION INTRAMUSCULAR; INTRAVENOUS PRN
Status: DISCONTINUED | OUTPATIENT
Start: 2022-01-21 | End: 2022-01-21 | Stop reason: SDUPTHER

## 2022-01-21 RX ORDER — HYDROCODONE BITARTRATE AND ACETAMINOPHEN 5; 325 MG/1; MG/1
2 TABLET ORAL PRN
Status: DISCONTINUED | OUTPATIENT
Start: 2022-01-21 | End: 2022-01-21 | Stop reason: HOSPADM

## 2022-01-21 RX ADMIN — TAMSULOSIN HYDROCHLORIDE 0.4 MG: 0.4 CAPSULE ORAL at 00:31

## 2022-01-21 RX ADMIN — FENTANYL CITRATE 25 MCG: 50 INJECTION, SOLUTION INTRAMUSCULAR; INTRAVENOUS at 08:45

## 2022-01-21 RX ADMIN — POTASSIUM CHLORIDE 10 MEQ: 7.46 INJECTION, SOLUTION INTRAVENOUS at 14:22

## 2022-01-21 RX ADMIN — ROCURONIUM BROMIDE 10 MG: 10 INJECTION INTRAVENOUS at 07:33

## 2022-01-21 RX ADMIN — APIXABAN 5 MG: 5 TABLET, FILM COATED ORAL at 10:17

## 2022-01-21 RX ADMIN — FENTANYL CITRATE 25 MCG: 50 INJECTION, SOLUTION INTRAMUSCULAR; INTRAVENOUS at 09:00

## 2022-01-21 RX ADMIN — FENTANYL CITRATE 100 MCG: 50 INJECTION INTRAMUSCULAR; INTRAVENOUS at 07:30

## 2022-01-21 RX ADMIN — MAGNESIUM SULFATE HEPTAHYDRATE 1000 MG: 1 INJECTION, SOLUTION INTRAVENOUS at 16:35

## 2022-01-21 RX ADMIN — POTASSIUM CHLORIDE 40 MEQ: 20 TABLET, EXTENDED RELEASE ORAL at 20:35

## 2022-01-21 RX ADMIN — MAGNESIUM SULFATE HEPTAHYDRATE 1000 MG: 1 INJECTION, SOLUTION INTRAVENOUS at 15:32

## 2022-01-21 RX ADMIN — POTASSIUM CHLORIDE 10 MEQ: 7.46 INJECTION, SOLUTION INTRAVENOUS at 15:28

## 2022-01-21 RX ADMIN — PROPOFOL 200 MG: 10 INJECTION, EMULSION INTRAVENOUS at 07:33

## 2022-01-21 RX ADMIN — DILTIAZEM HYDROCHLORIDE 120 MG: 120 CAPSULE, COATED, EXTENDED RELEASE ORAL at 20:36

## 2022-01-21 RX ADMIN — DEXAMETHASONE SODIUM PHOSPHATE 8 MG: 4 INJECTION, SOLUTION INTRAMUSCULAR; INTRAVENOUS at 07:39

## 2022-01-21 RX ADMIN — CIPROFLOXACIN 400 MG: 2 INJECTION, SOLUTION INTRAVENOUS at 07:39

## 2022-01-21 RX ADMIN — MIDAZOLAM 2 MG: 1 INJECTION INTRAMUSCULAR; INTRAVENOUS at 07:28

## 2022-01-21 RX ADMIN — FENTANYL CITRATE 25 MCG: 50 INJECTION, SOLUTION INTRAMUSCULAR; INTRAVENOUS at 08:31

## 2022-01-21 RX ADMIN — DIGOXIN 125 MCG: 125 TABLET ORAL at 10:17

## 2022-01-21 RX ADMIN — POTASSIUM CHLORIDE 10 MEQ: 7.46 INJECTION, SOLUTION INTRAVENOUS at 12:14

## 2022-01-21 RX ADMIN — LIDOCAINE HYDROCHLORIDE 100 MG: 20 INJECTION, SOLUTION EPIDURAL; INFILTRATION; INTRACAUDAL; PERINEURAL at 07:33

## 2022-01-21 RX ADMIN — POTASSIUM CHLORIDE 40 MEQ: 20 TABLET, EXTENDED RELEASE ORAL at 15:27

## 2022-01-21 RX ADMIN — Medication 160 MG: at 07:33

## 2022-01-21 RX ADMIN — POTASSIUM CHLORIDE 10 MEQ: 7.46 INJECTION, SOLUTION INTRAVENOUS at 16:34

## 2022-01-21 RX ADMIN — ONDANSETRON 4 MG: 2 INJECTION INTRAMUSCULAR; INTRAVENOUS at 07:40

## 2022-01-21 RX ADMIN — APIXABAN 5 MG: 5 TABLET, FILM COATED ORAL at 20:36

## 2022-01-21 RX ADMIN — POTASSIUM CHLORIDE 10 MEQ: 7.46 INJECTION, SOLUTION INTRAVENOUS at 00:43

## 2022-01-21 RX ADMIN — POTASSIUM CHLORIDE 10 MEQ: 7.46 INJECTION, SOLUTION INTRAVENOUS at 02:52

## 2022-01-21 RX ADMIN — SODIUM CHLORIDE: 9 INJECTION, SOLUTION INTRAVENOUS at 07:28

## 2022-01-21 RX ADMIN — POTASSIUM CHLORIDE 10 MEQ: 7.46 INJECTION, SOLUTION INTRAVENOUS at 17:52

## 2022-01-21 RX ADMIN — FENTANYL CITRATE 25 MCG: 50 INJECTION, SOLUTION INTRAMUSCULAR; INTRAVENOUS at 08:52

## 2022-01-21 RX ADMIN — DILTIAZEM HYDROCHLORIDE 120 MG: 120 CAPSULE, COATED, EXTENDED RELEASE ORAL at 10:17

## 2022-01-21 RX ADMIN — POTASSIUM CHLORIDE 10 MEQ: 7.46 INJECTION, SOLUTION INTRAVENOUS at 13:23

## 2022-01-21 ASSESSMENT — PULMONARY FUNCTION TESTS
PIF_VALUE: 22
PIF_VALUE: 0
PIF_VALUE: 19
PIF_VALUE: 20
PIF_VALUE: 23
PIF_VALUE: 25
PIF_VALUE: 19
PIF_VALUE: 22
PIF_VALUE: 10
PIF_VALUE: 23
PIF_VALUE: 9
PIF_VALUE: 23
PIF_VALUE: 19
PIF_VALUE: 1
PIF_VALUE: 1
PIF_VALUE: 22
PIF_VALUE: 19
PIF_VALUE: 2
PIF_VALUE: 22
PIF_VALUE: 23
PIF_VALUE: 30
PIF_VALUE: 19
PIF_VALUE: 22
PIF_VALUE: 19
PIF_VALUE: 20
PIF_VALUE: 19
PIF_VALUE: 19
PIF_VALUE: 23
PIF_VALUE: 22
PIF_VALUE: 25
PIF_VALUE: 22
PIF_VALUE: 1
PIF_VALUE: 1
PIF_VALUE: 0
PIF_VALUE: 17
PIF_VALUE: 2
PIF_VALUE: 22
PIF_VALUE: 8
PIF_VALUE: 19
PIF_VALUE: 22
PIF_VALUE: 0

## 2022-01-21 ASSESSMENT — PAIN SCALES - GENERAL
PAINLEVEL_OUTOF10: 6
PAINLEVEL_OUTOF10: 6
PAINLEVEL_OUTOF10: 4
PAINLEVEL_OUTOF10: 0
PAINLEVEL_OUTOF10: 5
PAINLEVEL_OUTOF10: 6

## 2022-01-21 ASSESSMENT — PAIN DESCRIPTION - LOCATION
LOCATION: ABDOMEN

## 2022-01-21 ASSESSMENT — PAIN DESCRIPTION - DESCRIPTORS
DESCRIPTORS: ACHING

## 2022-01-21 ASSESSMENT — PAIN DESCRIPTION - PAIN TYPE
TYPE: SURGICAL PAIN

## 2022-01-21 ASSESSMENT — PAIN DESCRIPTION - ONSET
ONSET: ON-GOING

## 2022-01-21 ASSESSMENT — PAIN DESCRIPTION - ORIENTATION
ORIENTATION: LEFT

## 2022-01-21 ASSESSMENT — LIFESTYLE VARIABLES: SMOKING_STATUS: 0

## 2022-01-21 ASSESSMENT — PAIN DESCRIPTION - FREQUENCY
FREQUENCY: CONTINUOUS

## 2022-01-21 ASSESSMENT — PAIN DESCRIPTION - PROGRESSION
CLINICAL_PROGRESSION: GRADUALLY IMPROVING
CLINICAL_PROGRESSION: NOT CHANGED
CLINICAL_PROGRESSION: GRADUALLY IMPROVING
CLINICAL_PROGRESSION: GRADUALLY IMPROVING

## 2022-01-21 ASSESSMENT — PAIN - FUNCTIONAL ASSESSMENT: PAIN_FUNCTIONAL_ASSESSMENT: 0-10

## 2022-01-21 NOTE — ANESTHESIA POSTPROCEDURE EVALUATION
Department of Anesthesiology  Postprocedure Note    Patient: Gigi Marx  MRN: [id number]  YOB: 1960  Date of evaluation: 1/21/2022  Time:  11:12 AM     Procedure Summary     Date: 01/21/22 Room / Location: [address] [place] [address]    Anesthesia Start: 8753 Anesthesia Stop: 1630    Procedures:       CYSTOSCOPY, LEFT URETEROSCOPY, HOLMIUM LASER, STONE MANIPULATION (Left )      LEFT STENT PLACEMENT (Left ) Diagnosis:       Calculus of kidney      (CALCULUS OF KIDNEY)    Surgeons: Marisa Ngo MD Responsible Provider: Maddie Rios MD    Anesthesia Type: general ASA Status: 3          Anesthesia Type: general    Edward Phase I: Edward Score: 9    Edward Phase II:      Last vitals: Reviewed and per EMR flowsheets.        Anesthesia Post Evaluation    Patient location during evaluation: PACU  Patient participation: complete - patient participated  Level of consciousness: awake and alert  Pain score: 2  Airway patency: patent  Nausea & Vomiting: no nausea and no vomiting  Complications: no  Cardiovascular status: blood pressure returned to baseline  Respiratory status: acceptable  Hydration status: euvolemic

## 2022-01-21 NOTE — PROGRESS NOTES
Hospitalist Progress Note      PCP: Vasu North MD    Date of Admission: 1/20/2022    Chief Complaint:  Hypokalemia- as a consult    Hospital Course: The patient is a 65 y.o. male with a PMH of atrial fibrillation for which patient is chronically anticoagulated. Patient has history of hypertension and recent history of COVID-19. Infection. Patient was brought to the hospital for a 6 mm obstructing stone within the proximal third of the left ureter causing mild left hydronephrosis. Patient's blood work showed a significant hypokalemia. Medical team is consulted to manage hypokalemia.     Subjective:       Medications:  Reviewed    Infusion Medications   Scheduled Medications    sodium chloride flush  5-40 mL IntraVENous 2 times per day    apixaban  5 mg Oral BID    tamsulosin  0.4 mg Oral Daily    dilTIAZem  120 mg Oral Q12H    digoxin  125 mcg Oral Daily     PRN Meds: sodium chloride flush, bisacodyl, oxyCODONE-acetaminophen, morphine, potassium chloride **OR** potassium alternative oral replacement **OR** potassium chloride      Intake/Output Summary (Last 24 hours) at 1/21/2022 1418  Last data filed at 1/21/2022 1324  Gross per 24 hour   Intake 1060 ml   Output 1300 ml   Net -240 ml       Physical Exam Performed:    /85   Pulse 99   Temp 98.7 °F (37.1 °C) (Oral)   Resp 18   Ht 5' 11\" (1.803 m)   Wt 210 lb 1.6 oz (95.3 kg)   SpO2 94%   BMI 29.30 kg/m²     General appearance: No apparent distress, appears stated age and cooperative. HEENT: Pupils equal, round, and reactive to light. Conjunctivae/corneas clear. Neck: Supple, with full range of motion. No jugular venous distention. Trachea midline. Respiratory:  Normal respiratory effort. Clear to auscultation, bilaterally without Rales/Wheezes/Rhonchi. Cardiovascular: Regular rate and rhythm with normal S1/S2 without murmurs, rubs or gallops. Abdomen: Soft, non-tender, non-distended with normal bowel sounds.   Musculoskeletal: No clubbing, cyanosis or edema bilaterally. Full range of motion without deformity. Skin: Skin color, texture, turgor normal.  No rashes or lesions. Neurologic:  Neurovascularly intact without any focal sensory/motor deficits. Cranial nerves: II-XII intact, grossly non-focal.  Psychiatric: Alert and oriented, thought content appropriate, normal insight  Capillary Refill: Brisk,3 seconds, normal   Peripheral Pulses: +2 palpable, equal bilaterally       Labs:   Recent Labs     01/21/22  1053   WBC 7.6   HGB 14.2   HCT 42.5        Recent Labs     01/20/22  1435 01/20/22  2312 01/21/22  1053   *  --  142  144   K 2.4*  2.4* 2.8* 2.7*  2.8*     --  106  107   CO2 30  --  24  24   BUN 10  --  8  8   CREATININE 1.0  --  0.8  0.8   CALCIUM 8.7  --  8.2*  8.2*     Recent Labs     01/21/22  1053   AST 24   ALT 23   BILITOT 0.7   ALKPHOS 77     No results for input(s): INR in the last 72 hours. No results for input(s): Luca Bares in the last 72 hours. Urinalysis:      Lab Results   Component Value Date    NITRU Negative 01/11/2022    WBCUA 7 01/11/2022    BACTERIA 1+ 01/11/2022    RBCUA 782 01/11/2022    BLOODU LARGE 01/11/2022    SPECGRAV 1.027 01/11/2022    GLUCOSEU Negative 01/11/2022       Radiology:  No orders to display           Assessment/Plan:  1. Hypokalemia/ Hypomagnesemia- unresolved  -Check Mg  -cont on a hypokalemia + oral IV protocol.  -give magnesium sulfate 2g po x 1  -q12 K checks       2. Atrial fibrillation I48.91  Heart rate is controlled  Continue patient on eliquis     3. Left nephrolithiasis and hydronephrosis  Due to obstructing ureteric stone  Plan is for stone extraction and stent placement in a.m.    4. Recent Covid 19 infection.  -does not qualify for any covid specific therapies    DVT Prophylaxis:   Diet: ADULT DIET;  Regular  Code Status: Full Code    PT/OT Eval Status: not needed    Dispo - can be DCd when K is corrected    Sameera Guzman MD

## 2022-01-21 NOTE — BRIEF OP NOTE
Brief Postoperative Note      Patient: Bridger Lim  YOB: 1960  MRN: [id number]    Date of Procedure: 1/21/2022    Pre-Op Diagnosis: CALCULUS OF left ureter    Post-Op Diagnosis: Same       Procedure(s):  CYSTOSCOPY, LEFT URETEROSCOPY, HOLMIUM LASER, STONE MANIPULATION  LEFT STENT PLACEMENT    Surgeon(s):  Castillo Mendes MD    Assistant:  * No surgical staff found *    Anesthesia: General    Estimated Blood Loss (mL): Minimal    Complications: None    Specimens:   ID Type Source Tests Collected by Time Destination   A : LEFT URETERAL CALCULUS Stone (Calculus) Kidney SURGICAL PATHOLOGY Castillo Mendes MD 1/21/2022 8575        Implants:  Implant Name Type Inv. Item Serial No.  Lot No. LRB No. Used Action   STENT URET 6FR L26CM PERCFLX HYDR+ TAPR TIP GRAD  STENT URET 6FR L26CM PERCFLX HYDR+ TAPR TIP GRAD  [employer] [id number] Left 1 Implanted         Drains: * No LDAs found *    Findings: left ureteral stone    Stent left with string. He can be discharged home when ok with medical service.   He should remove the stent on Monday AM    Electronically signed by Castillo Mendes MD on 1/21/2022 at 8:08 AM

## 2022-01-21 NOTE — PLAN OF CARE
Problem: Infection:  Goal: Will remain free from infection  Description: Will remain free from infection  1/21/2022 1051 by Edison Lopez RN  Outcome: Ongoing  1/21/2022 0103 by Marcellus Max RN  Outcome: Ongoing     Problem: Safety:  Goal: Free from accidental physical injury  Description: Free from accidental physical injury  1/21/2022 1051 by Edison Lopez RN  Outcome: Ongoing  1/21/2022 0103 by Marcellus Max RN  Outcome: Ongoing  Goal: Free from intentional harm  Description: Free from intentional harm  Outcome: Ongoing     Problem: Daily Care:  Goal: Daily care needs are met  Description: Daily care needs are met  1/21/2022 1051 by Edison Lopez RN  Outcome: Ongoing  1/21/2022 0103 by Marcellus Max RN  Outcome: Ongoing     Problem: Pain:  Goal: Patient's pain/discomfort is manageable  Description: Patient's pain/discomfort is manageable  1/21/2022 1051 by Edison Lopez RN  Outcome: Ongoing  1/21/2022 0103 by Marcellus Max RN  Outcome: Ongoing     Problem: Skin Integrity:  Goal: Skin integrity will stabilize  Description: Skin integrity will stabilize  Outcome: Ongoing     Problem: Discharge Planning:  Goal: Patients continuum of care needs are met  Description: Patients continuum of care needs are met  Outcome: Ongoing

## 2022-01-21 NOTE — PROGRESS NOTES
Urology Attending Progress Note      Subjective: no pain.   K up to 2.8 at midnight    Vitals:  /82   Pulse 94   Temp 97.1 °F (36.2 °C) (Temporal)   Resp 16   Ht 5' 11\" (1.803 m)   Wt 207 lb 7.3 oz (94.1 kg)   SpO2 96%   BMI 28.93 kg/m²   Temp  Av.8 °F (36.6 °C)  Min: 97.1 °F (36.2 °C)  Max: 98.1 °F (36.7 °C)    Intake/Output Summary (Last 24 hours) at 2022 3508  Last data filed at 2022 2158  Gross per 24 hour   Intake 360 ml   Output --   Net 360 ml       Exam: awake and alert  abd soft      Labs:  WBC:    Lab Results   Component Value Date    WBC 6.3 2022     Hemoglobin/Hematocrit:    Lab Results   Component Value Date    HGB 14.2 2022    HCT 42.3 2022     BMP:    Lab Results   Component Value Date     2022    K 2.8 2022    K 2.4 2022     2022    CO2 30 2022    BUN 10 2022    LABALBU 2.7 2021    CREATININE 1.0 2022    CALCIUM 8.7 2022    GFRAA >60 2022    LABGLOM >60 2022     PT/INR:    Lab Results   Component Value Date    PROTIME 11.5 2015    INR 1.06 2015     PTT:    Lab Results   Component Value Date    APTT 25.4 2015   [APTT          Impression/Plan: left ureteral stone  Hypokalemia    Plan:  Left ureteroscopy this morning  Discharge this afternoon if OK with medical service    Azucena Valerio MD

## 2022-01-21 NOTE — OP NOTE
[address] Khadar Rhoades [address]                                OPERATIVE REPORT    PATIENT NAME: Shannon Gill                      :        1960  MED REC NO:   [id number]                          ROOM:       4259  ACCOUNT NO:   [de-identified]                           ADMIT DATE: 2022  PROVIDER:     Noemi Mortimer, MD    DATE OF PROCEDURE:  2022    PREOPERATIVE DIAGNOSIS:  Left ureteral calculus. POSTOPERATIVE DIAGNOSIS:  Left ureteral calculus. OPERATION PERFORMED:  Cystoscopy; left ureteroscopy; laser lithotripsy;  stone manipulation; left ureteral stent insertion, 6-Paraguayan x 26 cm. SURGEON:  Noemi Mortimer, MD    ANESTHESIA:  General.    COMPLICATIONS:  None. BLOOD LOSS:  Minimal.    SPECIMEN:  Stone fragment sent for analysis. INDICATIONS:  A 73-year-old gentleman presented just over a week ago  with left flank pain. A CT scan showed a 6-mm proximal left ureteral  stone. He has had persistent pain and he is here for ureteroscopy. OPERATIVE PROCEDURE:  He was given Cipro. He was taken to the  cystoscopy suite. He moved onto the table. Anesthesia was induced. His position was changed to the lithotomy position. His genitalia were  prepped and draped. The 21-Paraguayan cystoscope advanced easily through  the urethra into the bladder. There were no urethral abnormalities. The prostate was nonobstructive. The bladder was normal.  A 0.035  ZIPwire was advanced into the left ureteral orifice up to the kidney. This was confirmed under fluoroscopy. Fluoroscopy showed the  calcification in the mid left ureter. I then attempted to advance the rigid ureteroscope through the urethra  and into the left ureter. Resistance was met at the ureterovesical  junction.   A second wire was advanced through the ureteroscope into the  left ureter, and then I was able to advance the ureteroscope over this  wire into the ureter. At the level of the mid ureter, the stone was  encountered. The 365-micron laser fiber was used to break the stone  into multiple smaller fragments. These were then grasped individually  with the ZeroTip Nitinol basket and pulled out. I then readvanced the  ureteroscope all the way up to the renal pelvis. There were no other  stone fragments identified. The ureteroscope was withdrawn. The cystoscope was reinserted over the  safety wire. A 6-Nepalese x 26 cm double-J stent was advanced over the  wire in a typical fashion. The wire was withdrawn. Fluoroscopy showed  the stent was in good position. The bladder was drained and the scope  was withdrawn. The string was left on the stent, which was secured to  the dorsal aspect of his penis with Steri-Strips. The patient was then awakened and transferred to Recovery, having  tolerated the procedure well. He will be instructed to remove the stent  in 72 hours and he will follow up in the office in six weeks with a  renal ultrasound.         Kelsey Morris MD    D: 01/21/2022 8:23:54       T: 01/21/2022 8:27:14     [email or web address]  Job#: [id number]     Doc#: [id number]    CC:

## 2022-01-21 NOTE — PROGRESS NOTES
Patient's potassium was re-drawn at midnight per MD Reyes.  Potassium is now 2.8. bag 5/6 is still infusing per protocol

## 2022-01-21 NOTE — CARE COORDINATION
INITIAL CASE MANAGEMENT ASSESSMENT    Reviewed chart, met with patient to assess possible discharge needs. Explained Case Management role/services. Living Situation: verified address, lives alone in a 2 story house with [address]    ADLs: independent     Transportation: active , should be able to get a ride home at dc     Medications: no barriers, uses [facility]    PCP: Killian Gibbs MD    PLAN/COMMENTS: denies any needs, plans to dc tomorrow & should be able to get a ride    CM provided contact information for patient or family to call with any questions. CM will follow and assist as needed.     Nikos Miller RN, BSN,   [phone]    Electronically signed by Nikos Miller RN on 1/21/2022 at 3:02 PM

## 2022-01-22 LAB
ANION GAP SERPL CALCULATED.3IONS-SCNC: 13 MMOL/L (ref 3–16)
BACTERIA: ABNORMAL /HPF
BILIRUBIN URINE: NEGATIVE
BLOOD, URINE: ABNORMAL
BUN BLDV-MCNC: 13 MG/DL (ref 7–20)
CALCIUM SERPL-MCNC: 9.1 MG/DL (ref 8.3–10.6)
CHLORIDE BLD-SCNC: 108 MMOL/L (ref 99–110)
CLARITY: ABNORMAL
CO2: 23 MMOL/L (ref 21–32)
COLOR: ABNORMAL
CREAT SERPL-MCNC: 1.2 MG/DL (ref 0.8–1.3)
EPITHELIAL CELLS, UA: ABNORMAL /HPF (ref 0–5)
GFR AFRICAN AMERICAN: >60
GFR NON-AFRICAN AMERICAN: >60
GLUCOSE BLD-MCNC: 159 MG/DL (ref 70–99)
GLUCOSE URINE: NEGATIVE MG/DL
HCT VFR BLD CALC: 40.6 % (ref 40.5–52.5)
HEMOGLOBIN: 13.3 G/DL (ref 13.5–17.5)
KETONES, URINE: ABNORMAL MG/DL
LEUKOCYTE ESTERASE, URINE: ABNORMAL
MCH RBC QN AUTO: 27.4 PG (ref 26–34)
MCHC RBC AUTO-ENTMCNC: 32.7 G/DL (ref 31–36)
MCV RBC AUTO: 83.8 FL (ref 80–100)
MICROSCOPIC EXAMINATION: YES
NITRITE, URINE: POSITIVE
PDW BLD-RTO: 15.7 % (ref 12.4–15.4)
PH UA: 6.5 (ref 5–8)
PLATELET # BLD: 321 K/UL (ref 135–450)
PMV BLD AUTO: 9.5 FL (ref 5–10.5)
POTASSIUM SERPL-SCNC: 3.9 MMOL/L (ref 3.5–5.1)
PROCALCITONIN: 0.05 NG/ML (ref 0–0.15)
PROTEIN UA: >=300 MG/DL
RBC # BLD: 4.84 M/UL (ref 4.2–5.9)
RBC UA: >100 /HPF (ref 0–4)
SODIUM BLD-SCNC: 144 MMOL/L (ref 136–145)
SPECIFIC GRAVITY UA: >=1.03 (ref 1–1.03)
URINE REFLEX TO CULTURE: YES
URINE TYPE: ABNORMAL
UROBILINOGEN, URINE: 1 E.U./DL
WBC # BLD: 15.1 K/UL (ref 4–11)
WBC UA: ABNORMAL /HPF (ref 0–5)

## 2022-01-22 PROCEDURE — 80048 BASIC METABOLIC PNL TOTAL CA: CPT

## 2022-01-22 PROCEDURE — 36415 COLL VENOUS BLD VENIPUNCTURE: CPT

## 2022-01-22 PROCEDURE — 84145 PROCALCITONIN (PCT): CPT

## 2022-01-22 PROCEDURE — 96365 THER/PROPH/DIAG IV INF INIT: CPT

## 2022-01-22 PROCEDURE — 87086 URINE CULTURE/COLONY COUNT: CPT

## 2022-01-22 PROCEDURE — 6370000000 HC RX 637 (ALT 250 FOR IP): Performed by: UROLOGY

## 2022-01-22 PROCEDURE — 81001 URINALYSIS AUTO W/SCOPE: CPT

## 2022-01-22 PROCEDURE — 6360000002 HC RX W HCPCS: Performed by: HOSPITALIST

## 2022-01-22 PROCEDURE — 85027 COMPLETE CBC AUTOMATED: CPT

## 2022-01-22 PROCEDURE — 6370000000 HC RX 637 (ALT 250 FOR IP): Performed by: INTERNAL MEDICINE

## 2022-01-22 PROCEDURE — 2580000003 HC RX 258: Performed by: UROLOGY

## 2022-01-22 PROCEDURE — G0378 HOSPITAL OBSERVATION PER HR: HCPCS

## 2022-01-22 PROCEDURE — 6370000000 HC RX 637 (ALT 250 FOR IP): Performed by: HOSPITALIST

## 2022-01-22 RX ORDER — PHENAZOPYRIDINE HYDROCHLORIDE 200 MG/1
200 TABLET, FILM COATED ORAL 3 TIMES DAILY PRN
Status: DISCONTINUED | OUTPATIENT
Start: 2022-01-22 | End: 2022-01-23 | Stop reason: HOSPADM

## 2022-01-22 RX ORDER — CIPROFLOXACIN 2 MG/ML
400 INJECTION, SOLUTION INTRAVENOUS EVERY 12 HOURS
Status: DISCONTINUED | OUTPATIENT
Start: 2022-01-22 | End: 2022-01-23 | Stop reason: HOSPADM

## 2022-01-22 RX ORDER — POTASSIUM CHLORIDE 20 MEQ/1
20 TABLET, EXTENDED RELEASE ORAL
Status: COMPLETED | OUTPATIENT
Start: 2022-01-22 | End: 2022-01-23

## 2022-01-22 RX ORDER — POLYETHYLENE GLYCOL 3350 17 G/17G
17 POWDER, FOR SOLUTION ORAL DAILY PRN
Status: DISCONTINUED | OUTPATIENT
Start: 2022-01-22 | End: 2022-01-23 | Stop reason: HOSPADM

## 2022-01-22 RX ADMIN — PHENAZOPYRIDINE HYDROCHLORIDE 200 MG: 200 TABLET ORAL at 14:55

## 2022-01-22 RX ADMIN — DILTIAZEM HYDROCHLORIDE 120 MG: 120 CAPSULE, COATED, EXTENDED RELEASE ORAL at 22:27

## 2022-01-22 RX ADMIN — APIXABAN 5 MG: 5 TABLET, FILM COATED ORAL at 22:27

## 2022-01-22 RX ADMIN — APIXABAN 5 MG: 5 TABLET, FILM COATED ORAL at 08:26

## 2022-01-22 RX ADMIN — POTASSIUM CHLORIDE 40 MEQ: 20 TABLET, EXTENDED RELEASE ORAL at 05:49

## 2022-01-22 RX ADMIN — TAMSULOSIN HYDROCHLORIDE 0.4 MG: 0.4 CAPSULE ORAL at 08:26

## 2022-01-22 RX ADMIN — SODIUM CHLORIDE, PRESERVATIVE FREE 10 ML: 5 INJECTION INTRAVENOUS at 08:27

## 2022-01-22 RX ADMIN — POTASSIUM CHLORIDE 20 MEQ: 20 TABLET, EXTENDED RELEASE ORAL at 16:48

## 2022-01-22 RX ADMIN — OXYCODONE AND ACETAMINOPHEN 1 TABLET: 5; 325 TABLET ORAL at 08:37

## 2022-01-22 RX ADMIN — SODIUM CHLORIDE, PRESERVATIVE FREE 10 ML: 5 INJECTION INTRAVENOUS at 22:29

## 2022-01-22 RX ADMIN — DILTIAZEM HYDROCHLORIDE 120 MG: 120 CAPSULE, COATED, EXTENDED RELEASE ORAL at 08:26

## 2022-01-22 RX ADMIN — POLYETHYLENE GLYCOL 3350 17 G: 17 POWDER, FOR SOLUTION ORAL at 14:55

## 2022-01-22 RX ADMIN — DIGOXIN 125 MCG: 125 TABLET ORAL at 08:26

## 2022-01-22 RX ADMIN — POTASSIUM CHLORIDE 20 MEQ: 20 TABLET, EXTENDED RELEASE ORAL at 12:19

## 2022-01-22 RX ADMIN — CIPROFLOXACIN 400 MG: 2 INJECTION, SOLUTION INTRAVENOUS at 12:24

## 2022-01-22 ASSESSMENT — PAIN DESCRIPTION - FREQUENCY: FREQUENCY: INTERMITTENT

## 2022-01-22 ASSESSMENT — PAIN DESCRIPTION - DESCRIPTORS: DESCRIPTORS: SHARP

## 2022-01-22 ASSESSMENT — PAIN DESCRIPTION - LOCATION: LOCATION: SCROTUM

## 2022-01-22 ASSESSMENT — PAIN DESCRIPTION - ONSET: ONSET: ON-GOING

## 2022-01-22 ASSESSMENT — PAIN - FUNCTIONAL ASSESSMENT: PAIN_FUNCTIONAL_ASSESSMENT: ACTIVITIES ARE NOT PREVENTED

## 2022-01-22 ASSESSMENT — PAIN DESCRIPTION - DIRECTION: RADIATING_TOWARDS: LEFT BACK/FLANK

## 2022-01-22 ASSESSMENT — PAIN DESCRIPTION - PAIN TYPE: TYPE: ACUTE PAIN;SURGICAL PAIN

## 2022-01-22 ASSESSMENT — PAIN DESCRIPTION - PROGRESSION: CLINICAL_PROGRESSION: GRADUALLY WORSENING

## 2022-01-22 ASSESSMENT — PAIN SCALES - GENERAL
PAINLEVEL_OUTOF10: 7
PAINLEVEL_OUTOF10: 1

## 2022-01-22 NOTE — PROGRESS NOTES
Patient alert and oriented x4, VSS, sitting up in bed. Patient reports increased pain that goes from scrotum to his left flank/back, urine it dark red in color, stent string taped to penis. Patient denies n/v, diarrhea, SOB. Patient seems a little more anxious today and states he thinks there might be another stone. Prn pain medication given per patient request, patient denies further needs at this time. Bed in lowest and locked position. Patient is UAL and tolerating well, non-slip socks on. Patient calls appropriately with call light within reach. Will continue to monitor.

## 2022-01-22 NOTE — PLAN OF CARE
Problem: Infection:  Goal: Will remain free from infection  Description: Will remain free from infection  1/22/2022 0850 by Jerry Wayne RN  Outcome: Ongoing  1/21/2022 2158 by Meredith Acosta RN  Note:   Patient Will remain free from   infection         Problem: Safety:  Goal: Free from accidental physical injury  Description: Free from accidental physical injury  1/22/2022 0850 by Jerry Wayne RN  Outcome: Ongoing  1/21/2022 2158 by Meredith Acosta RN  Note:   Patient will be Free from accidental physical   injury      Goal: Free from intentional harm  Description: Free from intentional harm  1/22/2022 0850 by Jerry Wayne RN  Outcome: Ongoing  1/21/2022 2158 by Meredith Acosta RN  Note:   Patient will be Free from intentional   harm         Problem: Daily Care:  Goal: Daily care needs are met  Description: Daily care needs are met  1/22/2022 0850 by Jerry Wayne RN  Outcome: Ongoing  1/21/2022 2158 by Meredith Acosta RN  Note:   Daily care needs will be met         Problem: Pain:  Goal: Patient's pain/discomfort is manageable  Description: Patient's pain/discomfort is manageable  1/22/2022 0850 by Jerry Wayne RN  Outcome: Ongoing  1/21/2022 2158 by Meredith Acosta RN  Note:   Patient's pain/discomfort will be    manageable         Problem: Skin Integrity:  Goal: Skin integrity will stabilize  Description: Skin integrity will stabilize  1/22/2022 0850 by Jerry Wayne RN  Outcome: Ongoing  1/21/2022 2158 by Meredith Acosta RN  Note:   Patient's Skin integrity will   stabilize         Problem: Discharge Planning:  Goal: Patients continuum of care needs are met  Description: Patients continuum of care needs are met  1/22/2022 0850 by Jerry Wayne RN  Outcome: Ongoing  1/21/2022 2158 by Meredith Acosta RN  Note:   Patients continuum of care needs will be    met

## 2022-01-22 NOTE — PROGRESS NOTES
Hospitalist Progress Note  1/22/2022 10:51 AM    PCP: Ann Costello MD    [phone]     Date of Admission: 1/20/2022                                                                                                                     HOSPITAL COURSE    Patient demographics:  The patient  Tino Mccabe is a 65 y.o. male     Significant past medical history:   Patient Active Problem List   Diagnosis    Hypokalemia    Hypertension due to endocrine disorder    Atrial fibrillation with rapid ventricular response ([address]    Pneumonia due to COVID-19 virus    Essential hypertension    Acute respiratory failure with hypoxia ([address]         Presenting symptoms:  Hypokalemia    Diagnostic workup:      CONSULTS DURING ADMISSION :   IP CONSULT TO HOSPITALIST      Patient was diagnosed with:  Hypokalemia  Atrial fibrillation  Left nephrolithiasis and hydronephrosis    Treatment while inpatient:                                                                                         ----------------------------------------------------------      SUBJECTIVE COMPLAINTS- Hypokalemia    Diet: ADULT DIET;  Regular      OBJECTIVE:   Patient Active Problem List   Diagnosis    Hypokalemia    Hypertension due to endocrine disorder    Atrial fibrillation with rapid ventricular response (HCC)    Pneumonia due to COVID-19 virus    Essential hypertension    Acute respiratory failure with hypoxia (HCC)       Allergies  Amoxicillin and Pcn [penicillins]    Medications    Scheduled Meds:   magnesium sulfate  2,000 mg IntraVENous Once    sodium chloride flush  5-40 mL IntraVENous 2 times per day    apixaban  5 mg Oral BID    tamsulosin  0.4 mg Oral Daily    dilTIAZem  120 mg Oral Q12H    digoxin  125 mcg Oral Daily     Continuous Infusions:  PRN Meds:  magnesium sulfate, sodium chloride flush, bisacodyl, oxyCODONE-acetaminophen, morphine, potassium chloride **OR** potassium alternative oral replacement **OR** potassium

## 2022-01-22 NOTE — PROGRESS NOTES
Patient having stent discomfort. I reassured and educated him. This will resolve when we remove the stent in our office on Monday (patient does not want to do this himself). . Hematuria is expected and will also resolve with stent removal. K up to 3.9..  Discharge home per medical team. [address] their assistance

## 2022-01-23 VITALS
WEIGHT: 217.59 LBS | HEART RATE: 80 BPM | OXYGEN SATURATION: 94 % | DIASTOLIC BLOOD PRESSURE: 70 MMHG | BODY MASS INDEX: 30.46 KG/M2 | HEIGHT: 71 IN | SYSTOLIC BLOOD PRESSURE: 146 MMHG | RESPIRATION RATE: 16 BRPM | TEMPERATURE: 97.9 F

## 2022-01-23 LAB
ANION GAP SERPL CALCULATED.3IONS-SCNC: 11 MMOL/L (ref 3–16)
BUN BLDV-MCNC: 15 MG/DL (ref 7–20)
CALCIUM SERPL-MCNC: 8.2 MG/DL (ref 8.3–10.6)
CHLORIDE BLD-SCNC: 110 MMOL/L (ref 99–110)
CO2: 24 MMOL/L (ref 21–32)
CREAT SERPL-MCNC: 1.1 MG/DL (ref 0.8–1.3)
GFR AFRICAN AMERICAN: >60
GFR NON-AFRICAN AMERICAN: >60
GLUCOSE BLD-MCNC: 104 MG/DL (ref 70–99)
HCT VFR BLD CALC: 34.8 % (ref 40.5–52.5)
HEMOGLOBIN: 11.3 G/DL (ref 13.5–17.5)
MCH RBC QN AUTO: 27.4 PG (ref 26–34)
MCHC RBC AUTO-ENTMCNC: 32.5 G/DL (ref 31–36)
MCV RBC AUTO: 84.3 FL (ref 80–100)
PDW BLD-RTO: 15.8 % (ref 12.4–15.4)
PLATELET # BLD: 271 K/UL (ref 135–450)
PMV BLD AUTO: 9.2 FL (ref 5–10.5)
POTASSIUM SERPL-SCNC: 3 MMOL/L (ref 3.5–5.1)
RBC # BLD: 4.13 M/UL (ref 4.2–5.9)
SODIUM BLD-SCNC: 145 MMOL/L (ref 136–145)
URINE CULTURE, ROUTINE: NORMAL
WBC # BLD: 11.5 K/UL (ref 4–11)

## 2022-01-23 PROCEDURE — G0378 HOSPITAL OBSERVATION PER HR: HCPCS

## 2022-01-23 PROCEDURE — 94761 N-INVAS EAR/PLS OXIMETRY MLT: CPT

## 2022-01-23 PROCEDURE — 96366 THER/PROPH/DIAG IV INF ADDON: CPT

## 2022-01-23 PROCEDURE — 6370000000 HC RX 637 (ALT 250 FOR IP): Performed by: UROLOGY

## 2022-01-23 PROCEDURE — 80048 BASIC METABOLIC PNL TOTAL CA: CPT

## 2022-01-23 PROCEDURE — 36415 COLL VENOUS BLD VENIPUNCTURE: CPT

## 2022-01-23 PROCEDURE — 6370000000 HC RX 637 (ALT 250 FOR IP): Performed by: HOSPITALIST

## 2022-01-23 PROCEDURE — 6360000002 HC RX W HCPCS: Performed by: HOSPITALIST

## 2022-01-23 PROCEDURE — 2580000003 HC RX 258: Performed by: UROLOGY

## 2022-01-23 PROCEDURE — 85027 COMPLETE CBC AUTOMATED: CPT

## 2022-01-23 RX ORDER — MAGNESIUM 200 MG
200 TABLET ORAL DAILY
Qty: 30 TABLET | Refills: 1 | Status: SHIPPED | OUTPATIENT
Start: 2022-01-23 | End: 2022-03-23

## 2022-01-23 RX ORDER — POTASSIUM CHLORIDE 20 MEQ/1
40 TABLET, EXTENDED RELEASE ORAL PRN
Qty: 60 TABLET | Refills: 1 | Status: SHIPPED | OUTPATIENT
Start: 2022-01-23 | End: 2022-03-23

## 2022-01-23 RX ORDER — CIPROFLOXACIN 500 MG/1
500 TABLET, FILM COATED ORAL 2 TIMES DAILY
Qty: 10 TABLET | Refills: 0 | Status: SHIPPED | OUTPATIENT
Start: 2022-01-23 | End: 2022-01-28

## 2022-01-23 RX ADMIN — CIPROFLOXACIN 400 MG: 2 INJECTION, SOLUTION INTRAVENOUS at 02:00

## 2022-01-23 RX ADMIN — APIXABAN 5 MG: 5 TABLET, FILM COATED ORAL at 08:31

## 2022-01-23 RX ADMIN — OXYCODONE AND ACETAMINOPHEN 1 TABLET: 5; 325 TABLET ORAL at 08:35

## 2022-01-23 RX ADMIN — TAMSULOSIN HYDROCHLORIDE 0.4 MG: 0.4 CAPSULE ORAL at 08:30

## 2022-01-23 RX ADMIN — OXYCODONE AND ACETAMINOPHEN 1 TABLET: 5; 325 TABLET ORAL at 15:48

## 2022-01-23 RX ADMIN — DIGOXIN 125 MCG: 125 TABLET ORAL at 08:30

## 2022-01-23 RX ADMIN — DILTIAZEM HYDROCHLORIDE 120 MG: 120 CAPSULE, COATED, EXTENDED RELEASE ORAL at 08:31

## 2022-01-23 RX ADMIN — SODIUM CHLORIDE, PRESERVATIVE FREE 10 ML: 5 INJECTION INTRAVENOUS at 10:44

## 2022-01-23 RX ADMIN — POTASSIUM CHLORIDE 20 MEQ: 20 TABLET, EXTENDED RELEASE ORAL at 08:30

## 2022-01-23 ASSESSMENT — PAIN SCALES - GENERAL
PAINLEVEL_OUTOF10: 0
PAINLEVEL_OUTOF10: 6
PAINLEVEL_OUTOF10: 0
PAINLEVEL_OUTOF10: 5

## 2022-01-23 ASSESSMENT — PAIN DESCRIPTION - PAIN TYPE: TYPE: ACUTE PAIN;SURGICAL PAIN

## 2022-01-23 NOTE — PLAN OF CARE
Problem: Infection:  Goal: Will remain free from infection  Description: Will remain free from infection  Outcome: Ongoing     Problem: Safety:  Goal: Free from accidental physical injury  Description: Free from accidental physical injury  Outcome: Ongoing  Goal: Free from intentional harm  Description: Free from intentional harm  Outcome: Ongoing     Problem: Daily Care:  Goal: Daily care needs are met  Description: Daily care needs are met  Outcome: Ongoing     Problem: Pain: Medication administered per Mar.   Goal: Patient's pain/discomfort is manageable  Description: Patient's pain/discomfort is manageable  Outcome: Ongoing     Problem: Skin Integrity:  Goal: Skin integrity will stabilize  Description: Skin integrity will stabilize  Outcome: Ongoing     Problem: Discharge Planning:  Goal: Patients continuum of care needs are met  Description: Patients continuum of care needs are met  Outcome: Ongoing

## 2022-01-23 NOTE — PROGRESS NOTES
The patient is still having significant voiding symptoms secondary to the stent. I discussed the case with Dr. Danita Hodgkin. She agrees that we can remove the stent now instead of tomorrow. Patient is unwilling to pull the string himself. (as is our standard practice) . I discussed the case with his nurse and instructed her in the technique. The patient's discharge home as per the medical service. He would've been home Friday postoperatively if not for the electrolyte difficulties.

## 2022-01-23 NOTE — CARE COORDINATION
Discharge order noted. Chart reviewed. No additional discharge needs identified.      Electronically signed by Maggie Alonzo RN MSN on 1/23/2022 at 11:53 AM

## 2022-01-23 NOTE — PROGRESS NOTES
Per Urology MD order, Chichi Roche Stent removed, patient tolerated well, no further needs at this time.

## 2022-01-23 NOTE — PROGRESS NOTES
Hospitalist Progress Note  1/23/2022 9:26 AM    PCP: Kaycee Dawkins MD    [phone]     Date of Admission: 1/20/2022                                                                                                                     HOSPITAL COURSE    Patient demographics:  The patient  Aj Wayne is a 65 y.o. male     Significant past medical history:   Patient Active Problem List   Diagnosis    Hypokalemia    Hypertension due to endocrine disorder    Atrial fibrillation with rapid ventricular response ([address]    Pneumonia due to COVID-19 virus    Essential hypertension    Acute respiratory failure with hypoxia ([address]         Presenting symptoms:  Hypokalemia    Diagnostic workup:      CONSULTS DURING ADMISSION :   IP CONSULT TO HOSPITALIST      Patient was diagnosed with:  Hypokalemia  Atrial fibrillation  Left nephrolithiasis and hydronephrosis    Treatment while inpatient:  65years old male with medical history significant for atrial fibrillation. Patient was diagnosed with left nephrolithiasis and hydronephrosis. Patient was brought in the hospital by urology for laser lithotripsy and ureteral stent placement. Patient was noted to have significant hypokalemia for which medical team was consulted. Patient was started on supplements for potassium and magnesium. Patient's potassium started improving and that he underwent the procedure successfully. Patient developed leukocytosis light sweating and pain after the procedure for which patient was started on IV antibiotics and patient will be discharged home on oral antibiotics. Discharge Condition:  stable     Discharged to:  Home     Activity:   as tolerated: Follow Up: Follow-up with urology on Monday                                                                       ----------------------------------------------------------      SUBJECTIVE COMPLAINTS- Hypokalemia    Diet: ADULT DIET;  Regular      OBJECTIVE: Patient Active Problem List   Diagnosis    Hypokalemia    Hypertension due to endocrine disorder    Atrial fibrillation with rapid ventricular response (HCC)    Pneumonia due to COVID-19 virus    Essential hypertension    Acute respiratory failure with hypoxia (HCC)       Allergies  Amoxicillin and Pcn [penicillins]    Medications    Scheduled Meds:   ciprofloxacin  400 mg IntraVENous Q12H    magnesium sulfate  2,000 mg IntraVENous Once    sodium chloride flush  5-40 mL IntraVENous 2 times per day    apixaban  5 mg Oral BID    tamsulosin  0.4 mg Oral Daily    dilTIAZem  120 mg Oral Q12H    digoxin  125 mcg Oral Daily     Continuous Infusions:  PRN Meds:  polyethylene glycol, phenazopyridine, magnesium sulfate, sodium chloride flush, bisacodyl, oxyCODONE-acetaminophen, morphine, potassium chloride **OR** potassium alternative oral replacement **OR** potassium chloride    Vitals   Vitals /wt   Patient Vitals for the past 8 hrs:   BP Temp Temp src Pulse Resp SpO2 Weight   01/23/22 0502 (!) 146/70 97.9 °F (36.6 °C) Oral 80 16 93 %    01/23/22 0500       217 lb 9.5 oz (98.7 kg)        72HR INTAKE/OUTPUT:      Intake/Output Summary (Last 24 hours) at 1/23/2022 0926  Last data filed at 1/23/2022 0500  Gross per 24 hour   Intake 500 ml   Output 300 ml   Net 200 ml       Exam:    Gen:   Alert and oriented ×3   Eyes: PERRL. No sclera icterus. No conjunctival injection. ENT: No discharge. Pharynx clear. External appearance of ears and nose normal.  Neck: Trachea midline. No obvious mass. Resp: No accessory muscle use. No crackles. No wheezes. No rhonchi. CV: Regular rate. Regular rhythm. No murmur or rub. No edema. GI: Non-tender. Non-distended. No hernia. Skin: Warm, dry, normal texture and turgor. Lymph: No cervical LAD. No supraclavicular LAD. M/S: / Ext. No cyanosis. No clubbing. No joint deformity. Neuro: CN 2-12 are intact,  no neurologic deficits noted.     PT/INR: No results for input(s): PROTIME, INR in the last 72 hours. APTT: No results for input(s): APTT in the last 72 hours. CBC:   Recent Labs     01/21/22  1053 01/22/22  1028 01/23/22  0519   WBC 7.6 15.1* 11.5*   HGB 14.2 13.3* 11.3*   HCT 42.5 40.6 34.8*   MCV 84.1 83.8 84.3    321 271       BMP:   Recent Labs     01/20/22  1435 01/20/22  2312 01/21/22  1053 01/21/22  2139 01/22/22  1028 01/23/22  0519   *  --  142  144  --  144 145   K 2.4*  2.4*   < > 2.7*  2.8* 3.3* 3.9 3.0*     --  106  107  --  108 110   CO2 30  --  24  24  --  23 24   BUN 10  --  8  8  --  13 15   CREATININE 1.0  --  0.8  0.8  --  1.2 1.1    < > = values in this interval not displayed. LIVER PROFILE:   Recent Labs     01/21/22  1053   ALKPHOS 77   AST 24   ALT 23   BILITOT 0.7     No results for input(s): AMYLASE in the last 72 hours. No results for input(s): LIPASE in the last 72 hours. UA:  Recent Labs     01/22/22  1440   WBCUA 10-20*   RBCUA >100*       TROPONIN: No results for input(s): CKTOTAL, TROPONINI in the last 72 hours. Lab Results   Component Value Date/Time    URRFLXCULT Yes 01/22/2022 02:40 PM       No results for input(s): TSHREFLEX in the last 72 hours. No components found for: WHG1944  POC GLUCOSE:  No results for input(s): POCGLU in the last 72 hours. No results for input(s): LABA1C in the last 72 hours.  No results found for: LABA1C      ASSESSMENT AND PLAN    Leukocytosis  WBC count increased  Patient complains of pain and nighttime sweating  Start patient on ciprofloxacin  Check procalcitonin     Hypokalemia/ Hypomagnesemia- unresolved  Continue with supplements  Both potassium and magnesium      Atrial fibrillation I48.91  Heart rate is controlled  Continue patient on eliquis     Left nephrolithiasis and hydronephrosis  Status post laser lithotripsy  Left ureteral stent insertion on 1/21/2021       . Covid 19 infection.  -does not qualify for any covid specific therapies        Recent Code Status: Full Code        Dispo -likely home in a.m. The patient and / or the family were informed of the results of any tests, a time was given to answer questions, a plan was proposed and they agreed with plan. Candice Gaines MD    This note was transcribed using [id number] [employer]. Please disregard any translational errors.

## 2022-01-24 ENCOUNTER — APPOINTMENT (OUTPATIENT)
Dept: CT IMAGING | Age: 62
End: 2022-01-24

## 2022-01-24 ENCOUNTER — HOSPITAL ENCOUNTER (EMERGENCY)
Age: 62
Discharge: HOME | End: 2022-01-24
Attending: EMERGENCY MEDICINE

## 2022-01-24 VITALS
RESPIRATION RATE: 16 BRPM | HEART RATE: 91 BPM | SYSTOLIC BLOOD PRESSURE: 178 MMHG | DIASTOLIC BLOOD PRESSURE: 87 MMHG | OXYGEN SATURATION: 96 % | TEMPERATURE: 97.9 F

## 2022-01-24 DIAGNOSIS — R10.9 LEFT FLANK PAIN: Primary | ICD-10-CM

## 2022-01-24 DIAGNOSIS — G89.18 POST-OP PAIN: ICD-10-CM

## 2022-01-24 PROCEDURE — 74176 CT ABD & PELVIS W/O CONTRAST: CPT

## 2022-01-24 PROCEDURE — 99283 EMERGENCY DEPT VISIT LOW MDM: CPT

## 2022-01-24 PROCEDURE — 6360000002 HC RX W HCPCS: Performed by: EMERGENCY MEDICINE

## 2022-01-24 RX ORDER — METHOCARBAMOL 500 MG/1
500 TABLET, FILM COATED ORAL 4 TIMES DAILY
Qty: 40 TABLET | Refills: 0 | Status: SHIPPED | OUTPATIENT
Start: 2022-01-24 | End: 2022-02-03

## 2022-01-24 RX ORDER — OXYCODONE HYDROCHLORIDE AND ACETAMINOPHEN 5; 325 MG/1; MG/1
1 TABLET ORAL EVERY 6 HOURS PRN
Qty: 12 TABLET | Refills: 0 | Status: SHIPPED | OUTPATIENT
Start: 2022-01-24 | End: 2022-01-27

## 2022-01-24 RX ORDER — KETOROLAC TROMETHAMINE 30 MG/ML
60 INJECTION, SOLUTION INTRAMUSCULAR; INTRAVENOUS ONCE
Status: COMPLETED | OUTPATIENT
Start: 2022-01-24 | End: 2022-01-24

## 2022-01-24 RX ORDER — ORPHENADRINE CITRATE 30 MG/ML
60 INJECTION INTRAMUSCULAR; INTRAVENOUS ONCE
Status: COMPLETED | OUTPATIENT
Start: 2022-01-24 | End: 2022-01-24

## 2022-01-24 RX ORDER — TAMSULOSIN HYDROCHLORIDE 0.4 MG/1
0.4 CAPSULE ORAL DAILY
Qty: 90 CAPSULE | Refills: 1 | Status: SHIPPED | OUTPATIENT
Start: 2022-01-24 | End: 2022-01-31 | Stop reason: SDUPTHER

## 2022-01-24 RX ORDER — MELOXICAM 7.5 MG/1
7.5 TABLET ORAL DAILY
Qty: 90 TABLET | Refills: 1 | Status: SHIPPED | OUTPATIENT
Start: 2022-01-24 | End: 2022-03-14

## 2022-01-24 RX ADMIN — ORPHENADRINE CITRATE 60 MG: 30 INJECTION INTRAMUSCULAR; INTRAVENOUS at 03:07

## 2022-01-24 RX ADMIN — KETOROLAC TROMETHAMINE 60 MG: 30 INJECTION, SOLUTION INTRAMUSCULAR at 03:06

## 2022-01-24 ASSESSMENT — PAIN DESCRIPTION - LOCATION
LOCATION: FLANK
LOCATION: FLANK

## 2022-01-24 ASSESSMENT — PAIN DESCRIPTION - FREQUENCY
FREQUENCY: CONTINUOUS
FREQUENCY: CONTINUOUS

## 2022-01-24 ASSESSMENT — PAIN DESCRIPTION - ORIENTATION
ORIENTATION: LEFT
ORIENTATION: LEFT

## 2022-01-24 ASSESSMENT — PAIN DESCRIPTION - ONSET
ONSET: PROGRESSIVE
ONSET: ON-GOING

## 2022-01-24 ASSESSMENT — PAIN - FUNCTIONAL ASSESSMENT
PAIN_FUNCTIONAL_ASSESSMENT: PREVENTS OR INTERFERES SOME ACTIVE ACTIVITIES AND ADLS
PAIN_FUNCTIONAL_ASSESSMENT: PREVENTS OR INTERFERES SOME ACTIVE ACTIVITIES AND ADLS

## 2022-01-24 ASSESSMENT — PAIN DESCRIPTION - PROGRESSION
CLINICAL_PROGRESSION: GRADUALLY IMPROVING
CLINICAL_PROGRESSION: NOT CHANGED

## 2022-01-24 ASSESSMENT — PAIN DESCRIPTION - PAIN TYPE
TYPE: ACUTE PAIN
TYPE: ACUTE PAIN

## 2022-01-24 ASSESSMENT — PAIN DESCRIPTION - DESCRIPTORS
DESCRIPTORS: SHARP
DESCRIPTORS: SHARP

## 2022-01-24 ASSESSMENT — PAIN SCALES - GENERAL
PAINLEVEL_OUTOF10: 7
PAINLEVEL_OUTOF10: 4

## 2022-01-24 NOTE — ED PROVIDER NOTES
Emergency Department Encounter    Patient: Deidre Torres  MRN: [id number]  : 1960  Date of Evaluation: 2022  ED Provider:  Lakia Yo MD    Triage Chief Complaint:   Flank Pain (left flank pain, the patient had kidney stone sx friday and had the stent removed today, 8/10)    [place]:  Deidre Torres is a 65 y.o. male that presents to the ER for evaluation of acute left flank pain status post stent removal, pain is 8 out of 10 with acute onset of spasm, he is able to spontaneously P.  No vomiting positive mild nausea.   Sharp stabbing left flank pain with ration left groin    ROS - see HPI, below listed is current ROS at time of my eval:  General:  No fevers, no chills, no weakness  Eyes:  No recent vison changes, no discharge  ENT:  No sore throat, no nasal congestion, no hearing changes  Cardiovascular:  No chest pain, no palpitations  Respiratory:  No shortness of breath, no cough  Gastrointestinal:  No pain, no nausea, no vomiting, no diarrhea  Musculoskeletal:  No muscle pain, no joint pain  Skin:  No rash, no pruritis, no easy bruising  Neurologic:  No speech problems, no headache  Genitourinary:  No dysuria, no hematuria, + flank pain  Endocrine:  No unexpected weight gain, no unexpected weight loss  Extremities:  no edema, no pain    Past Medical History:   Diagnosis Date    A-fib ([address]     Broken teeth     COVID-19     Hypertension     Kidney stones      Past Surgical History:   Procedure Laterality Date    CARDIOVERSION      x2    CYSTOSCOPY Left 2022    CYSTOSCOPY, LEFT URETEROSCOPY, HOLMIUM LASER, STONE MANIPULATION performed by Jaylon Robbins MD at [facility] Left 2022    LEFT STENT PLACEMENT performed by Jaylon Robbins MD at [facility]      as a child     Family History   Problem Relation Age of Onset    Diabetes Mother     High Blood Pressure Mother     Heart Disease Brother      Social History     Socioeconomic History  Marital status:      Spouse name: Not on file    Number of children: 1    Years of education: Not on file    Highest education level: Not on file   Occupational History    Not on file   Tobacco Use    Smoking status: Never Smoker    Smokeless tobacco: Never Used   Vaping Use    Vaping Use: Never used   Substance and Sexual Activity    Alcohol use: Yes     Comment: seldom    Drug use: Never    Sexual activity: Not Currently   Other Topics Concern    Not on file   Social History Narrative    Not on file     Social Determinants of Health     Financial Resource Strain: Low Risk     Difficulty of Paying Living Expenses: Not hard at all   Food Insecurity: No Food Insecurity    Worried About [address] in the Last Year: Never true    [address]  [employer] in the Last Year: Never true   Transportation Needs:     Lack of Transportation (Medical): Not on file    Lack of Transportation (Non-Medical):  Not on file   Physical Activity:     Days of Exercise per Week: Not on file    Minutes of Exercise per Session: Not on file   Stress:     Feeling of Stress : Not on file   Social Connections:     Frequency of Communication with Friends and Family: Not on file    Frequency of Social Gatherings with Friends and Family: Not on file    Attends Jew Services: Not on file    Active Member of [address] [place] [address] [employer] or Organizations: Not on file    Attends Club or Organization Meetings: Not on file    Marital Status: Not on file   Intimate Partner Violence:     Fear of Current or Ex-Partner: Not on file    Emotionally Abused: Not on file    Physically Abused: Not on file    Sexually Abused: Not on file   Housing Stability:     Unable to Pay for Housing in the Last Year: Not on file    Number of [place] in the Last Year: Not on file    Unstable Housing in the Last Year: Not on file     Current Facility-Administered Medications   Medication Dose Route Frequency Provider Last Rate Last Admin    ketorolac (TORADOL) injection 60 mg  60 mg IntraMUSCular Once Davis Harris MD        orphenadrine (NORFLEX) injection 60 mg  60 mg IntraMUSCular Once Davis Harris MD         Current Outpatient Medications   Medication Sig Dispense Refill    methocarbamol (ROBAXIN) 500 MG tablet Take 1 tablet by mouth 4 times daily for 10 days 40 tablet 0    meloxicam (MOBIC) 7.5 MG tablet Take 1 tablet by mouth daily 90 tablet 1    tamsulosin (FLOMAX) 0.4 MG capsule Take 1 capsule by mouth daily 90 capsule 1    oxyCODONE-acetaminophen (PERCOCET) 5-325 MG per tablet Take 1 tablet by mouth every 6 hours as needed for Pain for up to 3 days. Intended supply: 3 days.  Take lowest dose possible to manage pain 12 tablet 0    potassium chloride (KLOR-CON M) 20 MEQ extended release tablet Take 2 tablets by mouth as needed (Per Potassium Replacement Protocol) 60 tablet 1    magnesium 200 MG TABS tablet Take 1 tablet by mouth daily 30 tablet 1    ciprofloxacin (CIPRO) 500 MG tablet Take 1 tablet by mouth 2 times daily for 5 days 10 tablet 0    apixaban (ELIQUIS) 5 MG TABS tablet Take 5 mg by mouth 2 times daily      digoxin (LANOXIN) 125 MCG tablet Take 1 tablet by mouth daily 30 tablet 3    tamsulosin (FLOMAX) 0.4 MG capsule Take 1 capsule by mouth daily 30 capsule 0    dilTIAZem (CARDIZEM CD) 120 MG extended release capsule Take 2 capsules by mouth daily (Patient taking differently: Take 120 mg by mouth every 12 hours ) 30 capsule 3     Allergies   Allergen Reactions    Amoxicillin Other (See Comments)     Groin area extremely red-\"on fire\"-skin peels off from neck to feet-over 2 weeks    Pcn [Penicillins] Other (See Comments)     Groin area extremely red and skin peels off from neck to feet over 2 weeks       Nursing Notes Reviewed    Physical Exam:  Triage VS   ED Triage Vitals [01/24/22 0216]   Enc Vitals Group      BP (!) 193/102      Pulse 102      Resp 18      Temp 97.9 °F (36.6 °C)      Temp Source Oral SpO2 96 %      Weight       Height       Head Circumference       Peak Flow       Pain Score       Pain Loc       Pain Edu? Excl. in [address]? My pulse ox interpretation is - normal    General appearance:  No acute distress. Skin:  Warm. Dry. Eye:  Extraocular movements intact. Ears, nose, mouth and throat:  Oral mucosa moist   Neck:  Trachea midline. Extremity:  No swelling. Normal ROM     Heart:  Regular rate and rhythm, normal S1 & S2, no extra heart sounds. Perfusion:  intact  Respiratory:  Lungs clear to auscultation bilaterally. Respirations nonlabored. Abdominal:  Normal bowel sounds. Soft. Nontender. Non distended. No pulsatile masses  Back:  No CVA tenderness to palpation     Neurological:  Alert and oriented times 3. I have reviewed and interpreted all of the currently available lab results from this visit (if applicable):  No results found for this visit on 01/24/22. Radiographs (if obtained):  Radiologist's Report Reviewed:  CT ABDOMEN PELVIS WO CONTRAST Additional Contrast? None    Result Date: 1/24/2022  1. Bilateral nephrolithiasis. There is no evidence of an obstructing calculus. There is moderate left-sided hydroureteronephrosis with perinephric stranding. Findings could be related to a recently passed calculus or pyelonephritis. The previously seen obstructing calculus within the left proximal ureter is no longer visualized. 2. Colonic diverticulosis. 3. Small bilateral pleural effusions with adjacent atelectasis. EKG (if obtained): (All EKG's are interpreted by myself in the absence of a cardiologist)    MDM:  Patient presents with flank pain, based on history and physical exam and presentation most likely etiology is ureteral calculus.   In addition, other causes were considered including appendicitis, urinary tract infection, aortic dissection, mesenteric ischemia, as well as other surgical/malignant intra-abdominal processes, there is no evidence for these other possible processes at this time, based on patient's history, presentation, physical, and current state. Workup was initiated, labs, urine, CT ordered. Anti-inflammatories muscle actions were provided. Limited analgesia, there is no evidence of obstructing stone no clinical signs of sepsis. Outpatient follow-up with urology antispasmodics return if worse or new symptom    Clinical Impression:  1. Left flank pain    2. Post-op pain      Disposition referral (if applicable):  Rogelio Key MD  [facility] [id number] [phone]          Disposition medications (if applicable):  New Prescriptions    MELOXICAM (MOBIC) 7.5 MG TABLET    Take 1 tablet by mouth daily    METHOCARBAMOL (ROBAXIN) 500 MG TABLET    Take 1 tablet by mouth 4 times daily for 10 days    OXYCODONE-ACETAMINOPHEN (PERCOCET) 5-325 MG PER TABLET    Take 1 tablet by mouth every 6 hours as needed for Pain for up to 3 days. Intended supply: 3 days. Take lowest dose possible to manage pain    TAMSULOSIN (FLOMAX) 0.4 MG CAPSULE    Take 1 capsule by mouth daily       Comment: Please note this report has been produced using speech recognition software and may contain errors related to that system including errors in grammar, punctuation, and spelling, as well as words and phrases that may be inappropriate. Efforts were made to edit the dictations.       Denice Mccloud MD  54/90/45 [id number]

## 2022-01-24 NOTE — ED NOTES
Pt confirmed d/c paperwork have correct name. Discharge and education instructions reviewed with patient. Teach-back successful. Pt verbalized understanding and signed d/c papers. Pt denied questions at this time. No acute distress noted. Patient instructed to follow-up as noted - return to emergency department if symptoms worsen. Patient verbalized understanding. Discharged per EDMD with discharge instructions. Pt discharged per to private vehicle. Patient stable upon departure. Thanked patient for choosing [facility]) for care.               David Jackson RN  01/24/22 5168

## 2022-01-25 ENCOUNTER — TELEPHONE (OUTPATIENT)
Dept: OTHER | Facility: CLINIC | Age: 62
End: 2022-01-25

## 2022-01-25 LAB
CALCULI COMPOSITION: NORMAL
MASS: 69 MG
STONE DESCRIPTION: NORMAL

## 2022-01-25 NOTE — TELEPHONE ENCOUNTER
[facility] contacted pt regarding ED follow up appt. Spoke with pt, connected pt with Dr. Oksana Ramírez office to get scheduled for follow up.

## 2022-01-25 NOTE — TELEPHONE ENCOUNTER
[facility] attempted to contact pt regarding ED follow up appt. Attempt unsuccessful. Will follow up.

## 2022-01-25 NOTE — TELEPHONE ENCOUNTER
[facility] contacted Dr. Dino Hart office to schedule ED folllow up appt. Connected pt with office to get scheduled.

## 2022-01-26 ENCOUNTER — TELEPHONE (OUTPATIENT)
Dept: CARDIOLOGY CLINIC | Age: 62
End: 2022-01-26

## 2022-01-26 NOTE — TELEPHONE ENCOUNTER
Josiephine Bergeron called in this morning and has some questions regarding his upcoming ablation tomorrow.  Please contact him back at [phone]

## 2022-01-27 ENCOUNTER — ANESTHESIA EVENT (OUTPATIENT)
Dept: CARDIAC CATH/INVASIVE PROCEDURES | Age: 62
End: 2022-01-27

## 2022-01-27 ENCOUNTER — ANESTHESIA (OUTPATIENT)
Dept: CARDIAC CATH/INVASIVE PROCEDURES | Age: 62
End: 2022-01-27

## 2022-01-27 ENCOUNTER — HOSPITAL ENCOUNTER (OUTPATIENT)
Dept: CARDIAC CATH/INVASIVE PROCEDURES | Age: 62
Discharge: HOME | End: 2022-01-27

## 2022-01-27 VITALS
RESPIRATION RATE: 20 BRPM | SYSTOLIC BLOOD PRESSURE: 194 MMHG | TEMPERATURE: 96.8 F | OXYGEN SATURATION: 100 % | DIASTOLIC BLOOD PRESSURE: 107 MMHG

## 2022-01-27 VITALS
DIASTOLIC BLOOD PRESSURE: 89 MMHG | WEIGHT: 207 LBS | OXYGEN SATURATION: 94 % | RESPIRATION RATE: 15 BRPM | HEART RATE: 95 BPM | SYSTOLIC BLOOD PRESSURE: 152 MMHG | HEIGHT: 71 IN | TEMPERATURE: 97.2 F | BODY MASS INDEX: 28.98 KG/M2

## 2022-01-27 DIAGNOSIS — I48.92 LEFT ATRIAL FLUTTER BY ELECTROCARDIOGRAM (HCC): ICD-10-CM

## 2022-01-27 DIAGNOSIS — I48.19 PERSISTENT ATRIAL FIBRILLATION (HCC): ICD-10-CM

## 2022-01-27 LAB
ABO/RH: NORMAL
ANTIBODY SCREEN: NORMAL
POC ACT LR: 213 SEC
POC ACT LR: 361 SEC
SARS-COV-2, NAAT: NOT DETECTED

## 2022-01-27 PROCEDURE — 2709999900 HC NON-CHARGEABLE SUPPLY

## 2022-01-27 PROCEDURE — 6360000002 HC RX W HCPCS: Performed by: NURSE ANESTHETIST, CERTIFIED REGISTERED

## 2022-01-27 PROCEDURE — 86850 RBC ANTIBODY SCREEN: CPT

## 2022-01-27 PROCEDURE — C1730 CATH, EP, 19 OR FEW ELECT: HCPCS

## 2022-01-27 PROCEDURE — 2500000003 HC RX 250 WO HCPCS: Performed by: NURSE ANESTHETIST, CERTIFIED REGISTERED

## 2022-01-27 PROCEDURE — 93623 PRGRMD STIMJ&PACG IV RX NFS: CPT

## 2022-01-27 PROCEDURE — C1894 INTRO/SHEATH, NON-LASER: HCPCS

## 2022-01-27 PROCEDURE — 93657 TX L/R ATRIAL FIB ADDL: CPT

## 2022-01-27 PROCEDURE — 85347 COAGULATION TIME ACTIVATED: CPT

## 2022-01-27 PROCEDURE — 6360000002 HC RX W HCPCS

## 2022-01-27 PROCEDURE — 93656 COMPRE EP EVAL ABLTJ ATR FIB: CPT

## 2022-01-27 PROCEDURE — 2580000003 HC RX 258

## 2022-01-27 PROCEDURE — C1732 CATH, EP, DIAG/ABL, 3D/VECT: HCPCS

## 2022-01-27 PROCEDURE — 2720000010 HC SURG SUPPLY STERILE

## 2022-01-27 PROCEDURE — 86901 BLOOD TYPING SEROLOGIC RH(D): CPT

## 2022-01-27 PROCEDURE — 7100000000 HC PACU RECOVERY - FIRST 15 MIN

## 2022-01-27 PROCEDURE — 3700000001 HC ADD 15 MINUTES (ANESTHESIA)

## 2022-01-27 PROCEDURE — 93622 COMP EP EVAL L VENTR PAC&REC: CPT | Performed by: INTERNAL MEDICINE

## 2022-01-27 PROCEDURE — 93312 ECHO TRANSESOPHAGEAL: CPT

## 2022-01-27 PROCEDURE — 2580000003 HC RX 258: Performed by: NURSE ANESTHETIST, CERTIFIED REGISTERED

## 2022-01-27 PROCEDURE — 93655 ICAR CATH ABLTJ DSCRT ARRHYT: CPT | Performed by: INTERNAL MEDICINE

## 2022-01-27 PROCEDURE — 86900 BLOOD TYPING SEROLOGIC ABO: CPT

## 2022-01-27 PROCEDURE — 87635 SARS-COV-2 COVID-19 AMP PRB: CPT

## 2022-01-27 PROCEDURE — 2500000003 HC RX 250 WO HCPCS

## 2022-01-27 PROCEDURE — 3700000000 HC ANESTHESIA ATTENDED CARE

## 2022-01-27 PROCEDURE — C1759 CATH, INTRA ECHOCARDIOGRAPHY: HCPCS

## 2022-01-27 PROCEDURE — 93623 PRGRMD STIMJ&PACG IV RX NFS: CPT | Performed by: INTERNAL MEDICINE

## 2022-01-27 PROCEDURE — 93655 ICAR CATH ABLTJ DSCRT ARRHYT: CPT

## 2022-01-27 PROCEDURE — 93657 TX L/R ATRIAL FIB ADDL: CPT | Performed by: INTERNAL MEDICINE

## 2022-01-27 PROCEDURE — 93622 COMP EP EVAL L VENTR PAC&REC: CPT

## 2022-01-27 PROCEDURE — 7100000001 HC PACU RECOVERY - ADDTL 15 MIN

## 2022-01-27 PROCEDURE — 93005 ELECTROCARDIOGRAM TRACING: CPT | Performed by: INTERNAL MEDICINE

## 2022-01-27 PROCEDURE — 93656 COMPRE EP EVAL ABLTJ ATR FIB: CPT | Performed by: INTERNAL MEDICINE

## 2022-01-27 PROCEDURE — C1893 INTRO/SHEATH, FIXED,NON-PEEL: HCPCS

## 2022-01-27 PROCEDURE — 93662 INTRACARDIAC ECG (ICE): CPT | Performed by: INTERNAL MEDICINE

## 2022-01-27 RX ORDER — SODIUM CHLORIDE 9 MG/ML
INJECTION, SOLUTION INTRAVENOUS CONTINUOUS PRN
Status: DISCONTINUED | OUTPATIENT
Start: 2022-01-27 | End: 2022-01-27 | Stop reason: SDUPTHER

## 2022-01-27 RX ORDER — SODIUM CHLORIDE 0.9 % (FLUSH) 0.9 %
5-40 SYRINGE (ML) INJECTION PRN
Status: DISCONTINUED | OUTPATIENT
Start: 2022-01-27 | End: 2022-01-28 | Stop reason: HOSPADM

## 2022-01-27 RX ORDER — MIDAZOLAM HYDROCHLORIDE 1 MG/ML
INJECTION INTRAMUSCULAR; INTRAVENOUS PRN
Status: DISCONTINUED | OUTPATIENT
Start: 2022-01-27 | End: 2022-01-27 | Stop reason: SDUPTHER

## 2022-01-27 RX ORDER — 0.9 % SODIUM CHLORIDE 0.9 %
1000 INTRAVENOUS SOLUTION INTRAVENOUS
Status: ACTIVE | OUTPATIENT
Start: 2022-01-27 | End: 2022-01-27

## 2022-01-27 RX ORDER — PROTAMINE SULFATE 10 MG/ML
INJECTION, SOLUTION INTRAVENOUS PRN
Status: DISCONTINUED | OUTPATIENT
Start: 2022-01-27 | End: 2022-01-27 | Stop reason: SDUPTHER

## 2022-01-27 RX ORDER — ACETAMINOPHEN 325 MG/1
650 TABLET ORAL EVERY 4 HOURS PRN
Status: DISCONTINUED | OUTPATIENT
Start: 2022-01-27 | End: 2022-01-28 | Stop reason: HOSPADM

## 2022-01-27 RX ORDER — SODIUM CHLORIDE 0.9 % (FLUSH) 0.9 %
5-40 SYRINGE (ML) INJECTION EVERY 12 HOURS SCHEDULED
Status: DISCONTINUED | OUTPATIENT
Start: 2022-01-27 | End: 2022-01-28 | Stop reason: HOSPADM

## 2022-01-27 RX ORDER — ONDANSETRON 2 MG/ML
INJECTION INTRAMUSCULAR; INTRAVENOUS PRN
Status: DISCONTINUED | OUTPATIENT
Start: 2022-01-27 | End: 2022-01-27 | Stop reason: SDUPTHER

## 2022-01-27 RX ORDER — SODIUM CHLORIDE 9 MG/ML
25 INJECTION, SOLUTION INTRAVENOUS PRN
Status: DISCONTINUED | OUTPATIENT
Start: 2022-01-27 | End: 2022-01-28 | Stop reason: HOSPADM

## 2022-01-27 RX ORDER — DOBUTAMINE HYDROCHLORIDE 200 MG/100ML
INJECTION INTRAVENOUS CONTINUOUS PRN
Status: DISCONTINUED | OUTPATIENT
Start: 2022-01-27 | End: 2022-01-27 | Stop reason: SDUPTHER

## 2022-01-27 RX ORDER — MELOXICAM 7.5 MG/1
7.5 TABLET ORAL DAILY
Status: DISCONTINUED | OUTPATIENT
Start: 2022-01-27 | End: 2022-01-28 | Stop reason: HOSPADM

## 2022-01-27 RX ORDER — SUCCINYLCHOLINE/SOD CL,ISO/PF 200MG/10ML
SYRINGE (ML) INTRAVENOUS PRN
Status: DISCONTINUED | OUTPATIENT
Start: 2022-01-27 | End: 2022-01-27 | Stop reason: SDUPTHER

## 2022-01-27 RX ORDER — OXYCODONE HYDROCHLORIDE AND ACETAMINOPHEN 5; 325 MG/1; MG/1
1 TABLET ORAL EVERY 6 HOURS PRN
Status: DISCONTINUED | OUTPATIENT
Start: 2022-01-27 | End: 2022-01-28 | Stop reason: HOSPADM

## 2022-01-27 RX ORDER — FLECAINIDE ACETATE 100 MG/1
50 TABLET ORAL EVERY 12 HOURS SCHEDULED
Status: DISCONTINUED | OUTPATIENT
Start: 2022-01-27 | End: 2022-01-28 | Stop reason: HOSPADM

## 2022-01-27 RX ORDER — GLYCOPYRROLATE 0.2 MG/ML
INJECTION INTRAMUSCULAR; INTRAVENOUS PRN
Status: DISCONTINUED | OUTPATIENT
Start: 2022-01-27 | End: 2022-01-27 | Stop reason: SDUPTHER

## 2022-01-27 RX ORDER — PROPOFOL 10 MG/ML
INJECTION, EMULSION INTRAVENOUS PRN
Status: DISCONTINUED | OUTPATIENT
Start: 2022-01-27 | End: 2022-01-27 | Stop reason: SDUPTHER

## 2022-01-27 RX ORDER — LIDOCAINE HYDROCHLORIDE 10 MG/ML
INJECTION, SOLUTION INFILTRATION; PERINEURAL PRN
Status: DISCONTINUED | OUTPATIENT
Start: 2022-01-27 | End: 2022-01-27 | Stop reason: SDUPTHER

## 2022-01-27 RX ORDER — FENTANYL CITRATE 50 UG/ML
INJECTION, SOLUTION INTRAMUSCULAR; INTRAVENOUS PRN
Status: DISCONTINUED | OUTPATIENT
Start: 2022-01-27 | End: 2022-01-27 | Stop reason: SDUPTHER

## 2022-01-27 RX ORDER — DEXAMETHASONE SODIUM PHOSPHATE 4 MG/ML
INJECTION, SOLUTION INTRA-ARTICULAR; INTRALESIONAL; INTRAMUSCULAR; INTRAVENOUS; SOFT TISSUE PRN
Status: DISCONTINUED | OUTPATIENT
Start: 2022-01-27 | End: 2022-01-27 | Stop reason: SDUPTHER

## 2022-01-27 RX ORDER — DILTIAZEM HYDROCHLORIDE 120 MG/1
120 CAPSULE, COATED, EXTENDED RELEASE ORAL DAILY
Qty: 30 CAPSULE | Refills: 3 | Status: SHIPPED
Start: 2022-01-27 | End: 2022-04-28 | Stop reason: ALTCHOICE

## 2022-01-27 RX ORDER — DILTIAZEM HYDROCHLORIDE 120 MG/1
120 CAPSULE, COATED, EXTENDED RELEASE ORAL DAILY
Status: DISCONTINUED | OUTPATIENT
Start: 2022-01-27 | End: 2022-01-28 | Stop reason: HOSPADM

## 2022-01-27 RX ORDER — HEPARIN SODIUM 1000 [USP'U]/ML
INJECTION, SOLUTION INTRAVENOUS; SUBCUTANEOUS PRN
Status: DISCONTINUED | OUTPATIENT
Start: 2022-01-27 | End: 2022-01-27 | Stop reason: SDUPTHER

## 2022-01-27 RX ORDER — FLECAINIDE ACETATE 50 MG/1
50 TABLET ORAL 2 TIMES DAILY
Qty: 60 TABLET | Refills: 3 | Status: SHIPPED | OUTPATIENT
Start: 2022-01-27 | End: 2022-04-28 | Stop reason: ALTCHOICE

## 2022-01-27 RX ORDER — SODIUM CHLORIDE 9 MG/ML
INJECTION, SOLUTION INTRAVENOUS CONTINUOUS
Status: DISCONTINUED | OUTPATIENT
Start: 2022-01-27 | End: 2022-01-27

## 2022-01-27 RX ORDER — VECURONIUM BROMIDE 1 MG/ML
INJECTION, POWDER, LYOPHILIZED, FOR SOLUTION INTRAVENOUS PRN
Status: DISCONTINUED | OUTPATIENT
Start: 2022-01-27 | End: 2022-01-27 | Stop reason: SDUPTHER

## 2022-01-27 RX ORDER — PROTAMINE SULFATE 10 MG/ML
30 INJECTION, SOLUTION INTRAVENOUS
Status: ACTIVE | OUTPATIENT
Start: 2022-01-27 | End: 2022-01-27

## 2022-01-27 RX ORDER — FUROSEMIDE 10 MG/ML
INJECTION INTRAMUSCULAR; INTRAVENOUS PRN
Status: DISCONTINUED | OUTPATIENT
Start: 2022-01-27 | End: 2022-01-27 | Stop reason: SDUPTHER

## 2022-01-27 RX ORDER — TAMSULOSIN HYDROCHLORIDE 0.4 MG/1
0.4 CAPSULE ORAL DAILY
Status: DISCONTINUED | OUTPATIENT
Start: 2022-01-27 | End: 2022-01-28 | Stop reason: HOSPADM

## 2022-01-27 RX ORDER — HEPARIN SODIUM 10000 [USP'U]/100ML
INJECTION, SOLUTION INTRAVENOUS CONTINUOUS PRN
Status: DISCONTINUED | OUTPATIENT
Start: 2022-01-27 | End: 2022-01-27 | Stop reason: SDUPTHER

## 2022-01-27 RX ORDER — LIDOCAINE HYDROCHLORIDE AND EPINEPHRINE BITARTRATE 20; .01 MG/ML; MG/ML
15 INJECTION, SOLUTION SUBCUTANEOUS SEE ADMIN INSTRUCTIONS
Status: DISCONTINUED | OUTPATIENT
Start: 2022-01-27 | End: 2022-01-28 | Stop reason: HOSPADM

## 2022-01-27 RX ADMIN — MIDAZOLAM 2 MG: 1 INJECTION INTRAMUSCULAR; INTRAVENOUS at 13:03

## 2022-01-27 RX ADMIN — PROTAMINE SULFATE 150 MG: 10 INJECTION, SOLUTION INTRAVENOUS at 15:31

## 2022-01-27 RX ADMIN — FENTANYL CITRATE 100 MCG: 50 INJECTION INTRAMUSCULAR; INTRAVENOUS at 13:04

## 2022-01-27 RX ADMIN — PROPOFOL 150 MG: 10 INJECTION, EMULSION INTRAVENOUS at 13:08

## 2022-01-27 RX ADMIN — SODIUM CHLORIDE: 9 INJECTION, SOLUTION INTRAVENOUS at 13:14

## 2022-01-27 RX ADMIN — SUGAMMADEX 200 MG: 100 INJECTION, SOLUTION INTRAVENOUS at 15:31

## 2022-01-27 RX ADMIN — DOBUTAMINE HYDROCHLORIDE 10 MCG/KG/MIN: 200 INJECTION INTRAVENOUS at 14:59

## 2022-01-27 RX ADMIN — FUROSEMIDE 60 MG: 10 INJECTION, SOLUTION INTRAVENOUS at 15:31

## 2022-01-27 RX ADMIN — HEPARIN SODIUM 5000 UNITS: 1000 INJECTION INTRAVENOUS; SUBCUTANEOUS at 14:04

## 2022-01-27 RX ADMIN — SODIUM CHLORIDE: 9 INJECTION, SOLUTION INTRAVENOUS at 13:03

## 2022-01-27 RX ADMIN — HEPARIN SODIUM 5000 UNITS: 1000 INJECTION INTRAVENOUS; SUBCUTANEOUS at 14:00

## 2022-01-27 RX ADMIN — LIDOCAINE HYDROCHLORIDE 50 MG: 10 INJECTION, SOLUTION INFILTRATION; PERINEURAL at 13:08

## 2022-01-27 RX ADMIN — VECURONIUM BROMIDE 10 MG: 1 INJECTION, POWDER, LYOPHILIZED, FOR SOLUTION INTRAVENOUS at 13:12

## 2022-01-27 RX ADMIN — VECURONIUM BROMIDE 5 MG: 1 INJECTION, POWDER, LYOPHILIZED, FOR SOLUTION INTRAVENOUS at 14:18

## 2022-01-27 RX ADMIN — HEPARIN SODIUM 9000 UNITS/HR: 10000 INJECTION, SOLUTION INTRAVENOUS at 14:25

## 2022-01-27 RX ADMIN — Medication 140 MG: at 13:08

## 2022-01-27 RX ADMIN — GLYCOPYRROLATE 0.2 MG: 0.2 INJECTION, SOLUTION INTRAMUSCULAR; INTRAVENOUS at 13:14

## 2022-01-27 RX ADMIN — HEPARIN SODIUM 2000 UNITS: 1000 INJECTION INTRAVENOUS; SUBCUTANEOUS at 14:25

## 2022-01-27 RX ADMIN — ONDANSETRON 4 MG: 2 INJECTION INTRAMUSCULAR; INTRAVENOUS at 13:14

## 2022-01-27 RX ADMIN — DEXAMETHASONE SODIUM PHOSPHATE 8 MG: 4 INJECTION, SOLUTION INTRAMUSCULAR; INTRAVENOUS at 13:14

## 2022-01-27 ASSESSMENT — PULMONARY FUNCTION TESTS
PIF_VALUE: 21
PIF_VALUE: 21
PIF_VALUE: 20
PIF_VALUE: 18
PIF_VALUE: 20
PIF_VALUE: 19
PIF_VALUE: 1
PIF_VALUE: 20
PIF_VALUE: 1
PIF_VALUE: 21
PIF_VALUE: 20
PIF_VALUE: 0
PIF_VALUE: 20
PIF_VALUE: 20
PIF_VALUE: 19
PIF_VALUE: 20
PIF_VALUE: 3
PIF_VALUE: 20
PIF_VALUE: 18
PIF_VALUE: 17
PIF_VALUE: 20
PIF_VALUE: 21
PIF_VALUE: 20
PIF_VALUE: 18
PIF_VALUE: 20
PIF_VALUE: 21
PIF_VALUE: 18
PIF_VALUE: 18
PIF_VALUE: 20
PIF_VALUE: 20
PIF_VALUE: 18
PIF_VALUE: 20
PIF_VALUE: 20
PIF_VALUE: 15
PIF_VALUE: 20
PIF_VALUE: 20
PIF_VALUE: 0
PIF_VALUE: 20
PIF_VALUE: 20
PIF_VALUE: 18
PIF_VALUE: 20
PIF_VALUE: 19
PIF_VALUE: 19
PIF_VALUE: 20
PIF_VALUE: 19
PIF_VALUE: 19
PIF_VALUE: 20
PIF_VALUE: 20
PIF_VALUE: 21
PIF_VALUE: 20
PIF_VALUE: 19
PIF_VALUE: 18
PIF_VALUE: 18
PIF_VALUE: 17
PIF_VALUE: 19
PIF_VALUE: 17
PIF_VALUE: 15
PIF_VALUE: 15
PIF_VALUE: 1
PIF_VALUE: 19
PIF_VALUE: 20
PIF_VALUE: 38
PIF_VALUE: 20
PIF_VALUE: 21
PIF_VALUE: 19
PIF_VALUE: 21
PIF_VALUE: 20
PIF_VALUE: 19
PIF_VALUE: 20
PIF_VALUE: 19
PIF_VALUE: 19
PIF_VALUE: 20
PIF_VALUE: 18
PIF_VALUE: 18
PIF_VALUE: 20
PIF_VALUE: 18
PIF_VALUE: 20
PIF_VALUE: 20
PIF_VALUE: 1
PIF_VALUE: 17
PIF_VALUE: 20
PIF_VALUE: 1
PIF_VALUE: 19
PIF_VALUE: 20
PIF_VALUE: 19
PIF_VALUE: 20
PIF_VALUE: 18
PIF_VALUE: 19
PIF_VALUE: 20
PIF_VALUE: 18
PIF_VALUE: 19
PIF_VALUE: 18
PIF_VALUE: 18
PIF_VALUE: 20
PIF_VALUE: 19
PIF_VALUE: 16
PIF_VALUE: 19
PIF_VALUE: 19
PIF_VALUE: 20
PIF_VALUE: 19
PIF_VALUE: 20
PIF_VALUE: 21
PIF_VALUE: 18
PIF_VALUE: 20
PIF_VALUE: 19
PIF_VALUE: 21
PIF_VALUE: 5
PIF_VALUE: 18
PIF_VALUE: 21
PIF_VALUE: 20
PIF_VALUE: 19
PIF_VALUE: 21
PIF_VALUE: 20
PIF_VALUE: 18
PIF_VALUE: 1
PIF_VALUE: 20
PIF_VALUE: 20
PIF_VALUE: 1
PIF_VALUE: 20
PIF_VALUE: 26
PIF_VALUE: 19
PIF_VALUE: 20
PIF_VALUE: 27
PIF_VALUE: 20
PIF_VALUE: 18
PIF_VALUE: 20
PIF_VALUE: 20
PIF_VALUE: 19
PIF_VALUE: 20
PIF_VALUE: 20
PIF_VALUE: 18
PIF_VALUE: 20
PIF_VALUE: 19
PIF_VALUE: 1
PIF_VALUE: 18
PIF_VALUE: 17
PIF_VALUE: 20

## 2022-01-27 ASSESSMENT — PAIN SCALES - GENERAL
PAINLEVEL_OUTOF10: 0

## 2022-01-27 NOTE — ANESTHESIA PRE PROCEDURE
Department of Anesthesiology  Preprocedure Note       Name:  Matty Mckeon   Age:  65 y.o.  :  1960                                          MRN:  [id number]         Date:  2022      Surgeon: * No surgeons listed *    Procedure: * No procedures listed *    Medications prior to admission:   Prior to Admission medications    Medication Sig Start Date End Date Taking? Authorizing Provider   tamsulosin (FLOMAX) 0.4 MG capsule Take 1 capsule by mouth daily   Yes Cristal Chacko MD   potassium chloride (KLOR-CON M) 20 MEQ extended release tablet Take 2 tablets by mouth as needed (Per Potassium Replacement Protocol) 22  Yes Tanisha Hilton MD   magnesium 200 MG TABS tablet Take 1 tablet by mouth daily 22  Yes Tanisha Hilton MD   ciprofloxacin (CIPRO) 500 MG tablet Take 1 tablet by mouth 2 times daily for 5 days 22 Yes Tanisha Hilton MD   apixaban (ELIQUIS) 5 MG TABS tablet Take 5 mg by mouth 2 times daily   Yes Historical Provider, MD   digoxin (LANOXIN) 125 MCG tablet Take 1 tablet by mouth daily 22  Yes BALAJI Holloway - CNP   tamsulosin (FLOMAX) 0.4 MG capsule Take 1 capsule by mouth daily 1/11/22 2/10/22 Yes Rae Ogden PA-C   dilTIAZem (CARDIZEM CD) 120 MG extended release capsule Take 2 capsules by mouth daily  Patient taking differently: Take 120 mg by mouth every 12 hours  21  Yes Thomas Perkins MD   methocarbamol (ROBAXIN) 500 MG tablet Take 1 tablet by mouth 4 times daily for 10 days 22  Cirstal Chacko MD   meloxicam [facility]) 7.5 MG tablet Take 1 tablet by mouth daily    Cristal Chacko MD   oxyCODONE-acetaminophen (PERCOCET) 5-325 MG per tablet Take 1 tablet by mouth every 6 hours as needed for Pain for up to 3 days. Intended supply: 3 days.  Take lowest dose possible to manage pain 22  Cristal Chacko MD       Current medications:    Current Outpatient Medications   Medication Sig Dispense Refill    tamsulosin (FLOMAX) 0.4 MG capsule Take 1 capsule by mouth daily 90 capsule 1    potassium chloride (KLOR-CON M) 20 MEQ extended release tablet Take 2 tablets by mouth as needed (Per Potassium Replacement Protocol) 60 tablet 1    magnesium 200 MG TABS tablet Take 1 tablet by mouth daily 30 tablet 1    ciprofloxacin (CIPRO) 500 MG tablet Take 1 tablet by mouth 2 times daily for 5 days 10 tablet 0    apixaban (ELIQUIS) 5 MG TABS tablet Take 5 mg by mouth 2 times daily      digoxin (LANOXIN) 125 MCG tablet Take 1 tablet by mouth daily 30 tablet 3    tamsulosin (FLOMAX) 0.4 MG capsule Take 1 capsule by mouth daily 30 capsule 0    dilTIAZem (CARDIZEM CD) 120 MG extended release capsule Take 2 capsules by mouth daily (Patient taking differently: Take 120 mg by mouth every 12 hours ) 30 capsule 3    methocarbamol (ROBAXIN) 500 MG tablet Take 1 tablet by mouth 4 times daily for 10 days 40 tablet 0    meloxicam (MOBIC) 7.5 MG tablet Take 1 tablet by mouth daily 90 tablet 1    oxyCODONE-acetaminophen (PERCOCET) 5-325 MG per tablet Take 1 tablet by mouth every 6 hours as needed for Pain for up to 3 days. Intended supply: 3 days. Take lowest dose possible to manage pain 12 tablet 0     Current Facility-Administered Medications   Medication Dose Route Frequency Provider Last Rate Last Admin    0.9 % sodium chloride infusion   IntraVENous Continuous Marylene Hale, MD        sodium chloride flush 0.9 % injection 5-40 mL  5-40 mL IntraVENous 2 times per day Marylene Hale, MD        sodium chloride flush 0.9 % injection 5-40 mL  5-40 mL IntraVENous PRN Marylene Hale, MD        0.9 % sodium chloride infusion  25 mL IntraVENous PRN Marylene Hale, MD           Allergies:     Allergies   Allergen Reactions    Amoxicillin Other (See Comments)     Groin area extremely red-\"on fire\"-skin peels off from neck to feet-over 2 weeks    Pcn [Penicillins] Other (See Comments)     Groin area extremely red and skin peels off from neck to feet over 2 weeks       Problem List:    Patient Active Problem List   Diagnosis Code    Hypokalemia E87.6    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    Atrial fibrillation with rapid ventricular response (HCC) I48.91    Pneumonia due to COVID-19 virus U07.1, J12.82    Essential hypertension I10    Acute respiratory failure with hypoxia ([facility]) J96.01       Past Medical History:        Diagnosis Date    A-fib ([address]     Broken teeth     COVID-19     Hypertension     Kidney stones        Past Surgical History:        Procedure Laterality Date    CARDIOVERSION      x2    CYSTOSCOPY Left 1/21/2022    CYSTOSCOPY, LEFT URETEROSCOPY, HOLMIUM LASER, STONE MANIPULATION performed by Ezra Trevino MD at [address] [place] [address]. Left 1/21/2022    LEFT STENT PLACEMENT performed by [facility] MD at [facility]      as a child       Social History:    Social History     Tobacco Use    Smoking status: Never Smoker    Smokeless tobacco: Never Used   Substance Use Topics    Alcohol use: Yes     Comment: seldom                                Counseling given: Not Answered      Vital Signs (Current):   Vitals:    01/27/22 1128   BP: (!) 174/101   Pulse: 78   Resp: 20   Temp: 97.9 °F (36.6 °C)   TempSrc: Infrared   SpO2: 100%   Weight: 207 lb (93.9 kg)   Height: 5' 11\" (1.803 m)                                              BP Readings from Last 3 Encounters:   01/27/22 (!) 174/101   01/24/22 (!) 178/87   01/23/22 (!) 146/70       NPO Status:                                                                                 BMI:   Wt Readings from Last 3 Encounters:   01/27/22 207 lb (93.9 kg)   01/23/22 217 lb 9.5 oz (98.7 kg)   01/17/22 207 lb (93.9 kg)     Body mass index is 28.87 kg/m².     CBC:   Lab Results   Component Value Date    WBC 11.5 01/23/2022    RBC 4.13 01/23/2022    HGB 11.3 01/23/2022    HCT 34.8 01/23/2022    MCV 84.3 01/23/2022    RDW 15.8 01/23/2022     01/23/2022       CMP:   Lab Results   Component Value Date     01/23/2022    K 3.0 01/23/2022    K 2.8 01/21/2022    K 2.7 01/21/2022     01/23/2022    CO2 24 01/23/2022    BUN 15 01/23/2022    CREATININE 1.1 01/23/2022    GFRAA >60 01/23/2022    AGRATIO 1.1 01/21/2022    LABGLOM >60 01/23/2022    GLUCOSE 104 01/23/2022    PROT 6.4 01/21/2022    CALCIUM 8.2 01/23/2022    BILITOT 0.7 01/21/2022    ALKPHOS 77 01/21/2022    AST 24 01/21/2022    ALT 23 01/21/2022       POC Tests: No results for input(s): POCGLU, POCNA, POCK, POCCL, POCBUN, POCHEMO, POCHCT in the last 72 hours. Coags:   Lab Results   Component Value Date    PROTIME 11.5 08/09/2015    INR 1.06 08/09/2015    APTT 25.4 08/09/2015       HCG (If Applicable): No results found for: PREGTESTUR, PREGSERUM, HCG, HCGQUANT     ABGs: No results found for: PHART, PO2ART, ZRI6LEA, DTM4AJS, BEART, M8YWCZTF     Type & Screen (If Applicable):  No results found for: LABABO, LABRH    Drug/Infectious Status (If Applicable):  No results found for: HIV, HEPCAB    COVID-19 Screening (If Applicable):   Lab Results   Component Value Date    COVID19 Not Detected 01/27/2022           Anesthesia Evaluation  Patient summary reviewed no history of anesthetic complications:   Airway: Mallampati: II     Neck ROM: full  Mouth opening: > = 3 FB Dental: normal exam         Pulmonary:                             ROS comment: Covid infection during Thanksgiving, weaned off O2 1 month ago   Cardiovascular:    (+) hypertension:, dysrhythmias: atrial fibrillation,         Rhythm: irregular                      Neuro/Psych:   Negative Neuro/Psych ROS     (-) seizures and CVA           GI/Hepatic/Renal:   (+) renal disease: kidney stones,           Endo/Other:    (+) blood dyscrasia: anticoagulation therapy:., .    (-) diabetes mellitus, hypothyroidism, hyperthyroidism               Abdominal:             Vascular:           Other Findings: Anesthesia Plan      general     ASA 3       Induction: intravenous. MIPS: Postoperative opioids intended and Prophylactic antiemetics administered. Anesthetic plan and risks discussed with patient. Plan discussed with CRNA. This pre-anesthesia assessment may be used as a history and physical.    DOS STAFF ADDENDUM:    Pt seen and examined, chart reviewed (including anesthesia, drug and allergy history). No interval changes to history and physical examination. Anesthetic plan, risks, benefits, alternatives, and personnel involved discussed with patient. Patient verbalized an understanding and agrees to proceed.       Mj Ashley MD  January 27, 2022  12:46 PM

## 2022-01-27 NOTE — H&P
Cardiac Electrophysiology Consultation   Date: 1/27/2022  Reason for Consultation: Atrial fibrillation  Consult Requesting Odalys Morris MD     Chief Complaint:           Chief Complaint   Patient presents with    New Patient       afib         HPI: Hoang Brandon is a 62 y. o. patient with a history of COVID with new onset atrial fibrillation. He presented to [facility] on 11/12/2021 with complaints of generalized malaise, cough, and shortness of breath for the past week. Mora Briggs was diagnosed with COVID-19 but states that he continued to feel worse so he sought medical attention in the emergency department.  The patient was not vaccinated. Mora Briggs was found to be in atrial fibrillation with a rapid ventricular response prompting consultation. \" Was treated with IV Cardizem but had hypotension so this was changed to IV amiodarone, which was stopped on 11/14. Currently on PO Cardizem for rate control.     Interval History: Sathya Dozier presents to office to discuss treatment options for his atrial fibrillation. He states in July he was having a skin procedure after an allergic reaction to one of his medications and was told that he had atrial fibrillation he denied having symptoms of shortness of breath on exertion or fatigue at that time. He states he was never short of breath until he got Estanislado Diego is wearing oxygen NC and states he did not wear oxygen before he had COVD. He reports that he continues to have shortness of breath and fatigue on exertion. He states since he has been ill he has felt a fluttering in his chest. Patient does not endorse any identifiable exacerbating or alleviating factors for the atrial fibrillation. He is compliant with his medications and tolerating them well.  He denies chest pain/pressure, tightness, edema, shortness of breath, heart racing, palpitations, lightheadedness, dizziness, syncope, presyncope,  PND or orthopnea.         Past Medical History           Past Medical History:   Diagnosis Date    COVID-19              Past Surgical History             Past Surgical History:   Procedure Laterality Date    TONSILLECTOMY         as a child            Allergies:          Allergies   Allergen Reactions    Amoxicillin      Pcn [Penicillins]           Medication:   Home Medications                 Prior to Admission medications    Medication Sig Start Date End Date Taking? Authorizing Provider   apixaban (ELIQUIS) 5 MG TABS tablet Take 1 tablet by mouth 2 times daily 11/22/21   Yes Genevieve Vitale MD   dilTIAZem (CARDIZEM CD) 120 MG extended release capsule Take 1 capsule by mouth daily 11/23/21   Yes Genevieve Vitale MD            Social History:   reports that he has never smoked. He has never used smokeless tobacco. He reports previous drug use. He reports that he does not drink alcohol.         Family History:  family history includes Diabetes in his mother; Heart Disease in his brother; High Blood Pressure in his mother.   Reviewed. Denies family history of sudden cardiac death, arrhythmia, premature CAD     Review of System:     · General ROS: negative for - chills, fever   · Psychological ROS: negative for - anxiety or depression  · Ophthalmic ROS: negative for - eye pain or loss of vision  · ENT ROS: negative for - epistaxis, headaches, nasal discharge, sore throat   · Allergy and Immunology ROS: negative for - hives, nasal congestion   · Hematological and Lymphatic ROS: negative for - bleeding problems, blood clots, bruising or jaundice  · Endocrine ROS: negative for - skin changes, temperature intolerance or unexpected weight changes  · Respiratory ROS: negative for - cough, hemoptysis, pleuritic pain, SOB, sputum changes or wheezing  · Cardiovascular ROS: Per HPI.    · Gastrointestinal ROS: negative for - abdominal pain, blood in stools, diarrhea, hematemesis, melena, nausea/vomiting or swallowing difficulty/pain  · Genito-Urinary ROS: negative for - dysuria or incontinence  · Musculoskeletal ROS: negative for - joint swelling or muscle pain  · Neurological ROS: negative for - confusion, dizziness, gait disturbance, headaches, numbness/tingling, seizures, speech problems, tremors, visual changes or weakness  · Dermatological ROS: negative for - rash     Physical Examination:        Vitals:     21 1027   BP: 116/78   Pulse: 118   SpO2: 91%         · Constitutional: Oriented. No distress. · Head: Normocephalic and atraumatic. · Mouth/Throat: Oropharynx is clear and moist.   · Eyes: Conjunctivae normal. EOM are normal.   · Neck: Normal range of motion. Neck supple. No rigidity.  No JVD present. · Cardiovascular: Normal rate, regular rhythm, S1&S2 and intact distal pulses. · Pulmonary/Chest: Bilateral respiratory sounds. No wheezes. No rhonchi.    · Abdominal: Soft. Bowel sounds present. No distension, No tenderness. · Musculoskeletal: No tenderness. No edema    · Lymphadenopathy: Has no cervical adenopathy. · Neurological: Alert and oriented. Cranial nerve appears intact, No Gross deficit   · Skin: Skin is warm and dry. No rash noted. · Psychiatric: Has a normal mood, affect and behavior      Labs:  Reviewed.      EC2021 Atrial ffibrillation  with v-rate of 115 bpm with QRS duration 92 ms. No pathologic Q waves, ventricular pre-excitation, or QT prolongation.      Studies:   1. Event monitor: n/s        2. Echo: n/a        3. Stress Test:  n/a        4.  Cath: n/a     I independently reviewed and interpreted the ECG, MCOT, echocardiogram, stress test, and coronary angiography/PCI results and used them for my plan of care.      Procedures:  1.      Assessment/Plan:      Atrial fibrillation  -First seen on EKG 11/15/2021  -He has a [id number] Score 1 (HTN)   -On Eliquis 5 mg BID for  thromboembolic risk reduction.  -Tolerating well no signs or symptoms of abnormal bruising or bleeding.     According to the ACC/HRS guidelines, with a [id number] score of 1, he is not necessarily indicated for [address] [place] [address] at this time. However, if he wishes to pursue ablation, she will need to be on [address] [place] [address] for the next 3 months after ablation.     - Treatment options for atrial fibrillation including cardioversion, anti-arrhythmic medication therapy, rate control strategy, oral anticoagulation, and atrial fibrillation ablation were discussed with patient. Risks, benefits and alternative of each treatment options (care, treatment, and services) were explained. The patient was also presented reasonable alternatives to the proposed care, treatment, and services. The discussion I have had with the patient encompassed risks, benefits, and side effects related to the alternatives and the risks related to not receiving the proposed care, treatment and services. All questions were answered.      Recommend that he undergo CV with MARTY to restore normal sinus rhythm while awaiting for the ablation procedure. Patient agreeable.     We educated the patient that atrial fibrillation is a worsening and progressive disease, with more frequent episodes that will ensue. Subsequent episodes usually become more sustained to the extent that many individuals would then develop persistent atrial fibrillation. Once persistence is reached, permanent atrial fibrillation is inevitable. We also discussed the fact that atrial fibrillation is associated with stroke, including life-threatening stroke, and therefore oral anticoagulation is warranted depending on the patient's [id number] score.      We discussed different management options for atrial fibrillation including their risks and benefits.  These options include use of cardioversion (mainly for persisting atrial fibrillation or atrial flutter) which provides an effective immediate therapy with success rates of 75% or higher, but it provides no short nor long term efficacy.  Anti-arrhythmic medications provide a very effective short term therapy, but even with our most potent anti-arrhythmic medication there is limited long term efficacy (clinical studies have shown that 40% of patients remain atrial fibrillation-free after 4 years of follow-up after starting one of the more powerful anti-arrhythmic medication (amiodarone), and, if extrapolated, may have further diminishing success as time goes on). Atrial fibrillation ablation is a potentially curative therapy with very reasonable success rate after a first time procedure and with improving success rates with subsequent procedures.      The risks, benefits and alternatives of the atrial fibrillation ablation procedure were discussed with the patient. The risks including, but not limited to, bleeding, infection, radiation exposure, injury to vascular, cardiac and surrounding structures (including pneumothorax), stroke, cardiac perforation, tamponade, need for emergent open heart surgery, need for pacemaker implantation, injury to the phrenic nerve, injury to the esophagus, myocardial infarction and death were discussed in detail. The patient was also counseled at length about the risks of tan Covid-19 in the ghislaine-operative and post-operative states including the recovery window of their procedure. The patient was made aware that tan Covid-19 after a surgical procedure may worsen their prognosis for recovering from the virus and lend to a higher morbidity and or mortality risk. The patient was given the option of postponing their procedure. The patient was also presented reasonable alternatives to the proposed care, treatment, and services. The discussion I have had with the patient encompassed risks, benefits, and side effects related to the alternatives and the risks related to not receiving the proposed care, treatment and services.      The patient opted to proceed with the atrial fibrillation ablation.  We will schedule for a radiofrequency ablation with Carto Navigation system with a MARTY procedure immediately prior to this ablation. A cardiac CTA will be ordered for pulmonary vein mapping prior to this procedure. We will hold Eliquis  for 12 hours prior to procedure. We will order BMP, CBC, PT/INR, and Type & Screen prior to the procedure.      -Encouraged to watch \"That Sugar Film\" on [employer], which discusses the negative impact of processed sugar has on the body.     -Much time was spent counseling the patient on healthier lifestyle, following a low sodium diet <2,400 mg of sodium a day and dramatically decreasing the intake of processed sugar.     -Patient educated to eat a diet which includes organic fruits, vegetables and meats.        Follow up three months after ablation procedure with Mirta De Oliveira CNP. He will follow up one to two weeks after cardioversion with Mirta De Oliveira CNP.     Thank you for allowing me to participate in the care of Hoang Brandon. All questions and concerns were addressed to the patient/family. Alternatives to my treatment were discussed. I have reviewed the history and physical and examined the patient and find no relevant changes. I have reviewed with the patient and/or family the risks and benefits to the proposed procedure. The patient was presented with the option of postponing the proposed procedure. The patient was also presented reasonable alternatives to the proposed care, treatment, and services.  The discussion I have had with the patient encompassed risks, benefits, and side effects related to the alternatives and the risks related to not receiving the proposed care, treatment and services.      Thomas Perkins MD, [address], [facility] [place], [place] [address] Electrophysiology  [address], [address] [place] [address]  Jax Hernandez [facility] [address]  [phone]

## 2022-01-27 NOTE — ANESTHESIA POSTPROCEDURE EVALUATION
Department of Anesthesiology  Postprocedure Note    Patient: Pauline Silva  MRN: [id number]  YOB: 1960  Date of evaluation: 1/27/2022  Time:  6:00 PM     Procedure Summary     Date: 01/27/22 Room / Location: [facility]    Anesthesia Start: 4630 Anesthesia Stop: 4780    Procedure: WST MARTY AFIB ABLATION W ANES Diagnosis:     Scheduled Providers:  Responsible Provider: Alejandro Ramirez MD    Anesthesia Type: General ASA Status: 3          Anesthesia Type: General    Edward Phase I: Edward Score: 10    Edward Phase II:      Last vitals: Reviewed and per EMR flowsheets.        Anesthesia Post Evaluation    Patient location during evaluation: PACU  Patient participation: complete - patient participated  Level of consciousness: awake and alert  Pain score: 0  Nausea & Vomiting: no nausea  Complications: no  Cardiovascular status: hemodynamically stable  Respiratory status: acceptable  Hydration status: stable

## 2022-01-27 NOTE — PROCEDURES
[facility]     Electrophysiology Procedure Note       Date of Procedure: 1/27/2022  Patient's Name: Almita Jarvis  YOB: 1960   Medical Record Number: [id number]  Referring Physician: Sugey Pastrana MD  Procedure Performed by: Jennifer Lantigua MD    Procedure performed:  · Electrophysiology study with radiofrequency ablation of atrial fibrillation and pulmonary vein isolation   · Ablation of roof-dependent left atrial flutter with  ms with straight up-down activation  · Ablation of anterior wall dependent left atrial flutter with  ms with proximal-distal activation  · Additional ablation with creation of a roof line (for roof-dependent left atrial flutter), anterior line from mitral annulus to the roof (for anterior wall dependent left atrial flutter)  · Additional ablation of complex fractionated atrial electrograms (for atrial fibrillation) on the LA septal wall  · Additional ablation of complex fractionated atrial electrograms (for atrial fibrillation) on the anterior base of the LA appendage  · 3-D electroanatomical mapping of the left atrium    · Transseptal puncture through an intact septum x 2 under intracardiac ultrasound guidance without fluoroscopic guidance   · Intracardiac echocardiography  · Left ventricular pacing and recording  · Drug infusion with an attempt to induce atrial tachydysrhythmia  · Transesophageal echocardiogram  · Anesthesia: General anesthesia provided by the Anesthesia service    Indications for procedure:    Almita Jarvis is a 65 y.o. male who has a history of persistent atrial fibrillation who is symptomatic with symptoms of dyspnea with minimal exertion and fatigue who has failed antiarrhythmic therapy in the past is now here for an ablation for persistent atrial fibrillation. Details of Procedure: The risks, benefits and alternatives of the ablation procedure were discussed with the patient.  The risks including, but not limited to, the risks of bleeding, infection, radiation exposure, injury to vascular, cardiac and surrounding structures (including pneumothorax), stroke, cardiac perforation, tamponade, need for emergent open heart surgery, need for pacemaker implantation, esophageal injury and fistula, myocardial infarction and death were discussed in detail. The patient was also counseled at length about the risks of tan Covid-19 in the ghislaine-operative and post-operative states including the recovery window of their procedure. The patient was made aware that tan Covid-19 after a surgical procedure may worsen their prognosis for recovering from the virus and lend to a higher morbidity and or mortality risk. The patient was given the option of postponing their procedure. The patient was also presented reasonable alternatives to the proposed care, treatment, and services. The discussion I have had with the patient encompassed risks, benefits, and side effects related to the alternatives and the risks related to not receiving the proposed care, treatment and services. The patient opted to proceed with the ablation. Written informed consent was signed and placed in the chart. Patient was brought to the EP lab in a fasting non-sedate state. Patient underwent general anesthesia by anesthesia team. The patient was monitored continuously with ECG, pulse oximetry, blood pressure monitoring, and direct observation. No urinary lucero was placed in order to prevent any hematuria or urinary tract infection. We initially performed a transesophageal echo that showed no left atrial appendage clot/thrombus. Both groins were prepped in a sterile fashion. We gained access in the right femoral vein. One 8 Luxembourgish short sheath for ICE and subsequently for CS catheters were placed in the right femoral vein using modified seldinger technique. Two 8.5F SLO sheaths were introduced into the right femoral vein using modified Seldinger technique.  Then a CS ablations for the left sided pulmonary veins were performed which resulted in electrical isolation of these pulmonary veins and eradication of the PV fascicles. Similarly, wide area circumferential ablations for the right sided pulmonary veins along with tiny ablation were performed which resulted in electrical isolation of these pulmonary veins and eradication of the PV fascicles. As we were 2/3 of the way completed with the R antral ablation, the patient's atrial fibrillation and left atrial flutters terminated to sinus rhythm for the remainder of the procedure. After isolating the pulmonary veins, given that the patient manifested roof dependent left atrial flutter, we ablated a linear line along the roof of the left atrium. Because the patient manifested anterior wall dependent left atrial flutter, we ablated a linear ablation on the anterior wall from the mitral annulus to the roof to target the anterior wall dependent left atrial flutter. We then evaluated the left atrium for complex fractionated atrial electrogram, a separate mechanism that would initiate and/or perpetuate atrial fibrillation apart from the pulmonary vein fascicles and antral ablations. We found 1 site(s) on the septal aspect of the LA and ablated these foci. We then evaluated the base of the LA appendage for complex fractionated atrial electrogram, a seprate mechanism that would initiate and/or perpetuate atrial fibrillation apart from the pulmonary vein fascicles and antral ablations. We found 1 site(s) on the anterior base of the LA appendage and ablated these foci. After ablation was complete, catheters were placed in the left and right atrium, His-position, right ventricle, and left ventricle for pacing and recording. Arrhythmia was attempted to be induced by rapid atrial and ventricular pacing, and there was no induction of atrial tachydysrhythmia.      Maximum output (20mA) pacing in the L antrum and R antrum were also performed and showed dissociation from the rest of the left atrium. This was checked circumferentially around the antrums and verified many times. We evaluated the roof line and found that there was complete, bidirectional block. The anterior wall ablated line was evaluated and showed complete bidirectional block, as evidenced by a longer transit time on the medial aspect of the ablated line when compared to the lateral aspect of the ablated line when pacing from the LA appendage. Adenosine bolus of 18mg was also given for each of the 4 pulmonary veins to assess for acute re-connection and to attempt to induce atrial fibrillation. We had adequate adenosine effect (AV blocks of > 3 seconds) and there were no pulmonary fascicles seen in any of the veins nor were there any atrial tachydysrhythmia induced. We then administered dobutamine infusion up to 10 mcg/kg/min in order to achieve at least a 20% increase in heart rate from the basal heart rate to induce any atrial tachydysrhythmia, and there were no atrial tachydysrhythmia induced. We then performed an EP study and programmed stimulation using our CS catheter and ablation catheter to assess the cardiac conduction system and to attempt to induce atrial tachydysrhythmia. The ablation catheter were moved from the left atrium to the left ventricular apex and His bundle position.  His bundle potentials was recorded and pacing and recordings were performed from right atrium, coronary sinus and LV apex with the following results:     Sinus cycle length was 718 msec  WY interval was 131 msec  QRS duration was 99 msec  QT interval was 375 msec  AH interval was 75 msec  HV interval was 35 msec  Pacing from left atrium, 1:1 conduction over AV node with (AV wenckebach) was 320  msec  Pacing from left atrium, AV chiquis ERP was 600/200 msec   Pacing from LV apex, 1:1 retrograde conduction over AV node (VA wenckebach) was attempted but showed VA dissociation. Then both the ablation, Pentarray, and CS catheters were removed from the body, and all 3 sheaths were removed from the right femoral vein with a figure-of-eight suture that was placed to provide hemostasis while awaiting the downtrending of the ACT. Protamine 150mg IV x 1 was administered to partially reverse the IV heparin that was administered during the atrial fibrillation ablation procedure. Under intracardiac ultrasound guidance, we evaluated for pericardial effusion, and there was no evidence of such. Specimen collected: none    Estimated blood loss: < 50 cc    The patient tolerated the procedure well and there were no complications. Patient was extubated and transferred to the floor in stable condition. Conclusion:     - Pre- and post-procedure diagnoses were persistent atrial fibrillation, roof-dependent left atrial flutter with  ms with straight up-down activation, anterior wall dependent left atrial flutter with  ms with proximal-distal activation  - Pulmonary vein isolations using wide area circumferential radiofrequency ablation   - Arlene ablations on the R antrum  - Additional ablation with creation of roof line (for roof-dependent left atrial flutter), anterior line from the mitral annulus to the roof (for anterior wall dependent left atrial flutter)  - Ablation of complex fractionated atrial electrogram in the left atrial septal wall (for atrial fibrillation)  - Ablation of complex fractionated atrial electrogram in the left atrial appendage anterior base (for atrial fibrillation)    Plan:   The patient will be monitored and receive the usual post ablation care. If there are no complications, the patient will be discharged from the hospital either later today or tomorrow with a new prescription for Flecainide 50mg po BID, pre-admission Diltiazem CD 120mg po daily, and pre-admission Eliquis 5mg po BID.  The patient will follow-up with the EP NP in 3 month's time.    Thank you for allowing us to participate in the care of your patient. If you have any questions or concerns, please do not hesitate to contact me.     Annelise Killian MD, MS, [facility], [facility]  Cardiac Electrophysiology  [address]  [address] [facility], [address] [place] [address]  Jax Joseph [address]  [phone]

## 2022-01-27 NOTE — PROGRESS NOTES
Pt to PACU from cath lab. Pt is alert and oriented, VSS, denies pain at this time.  Cath site on R groin is soft, drsg is clean, dry and intact

## 2022-01-28 LAB
EKG ATRIAL RATE: 93 BPM
EKG DIAGNOSIS: NORMAL
EKG P AXIS: 62 DEGREES
EKG P-R INTERVAL: 140 MS
EKG Q-T INTERVAL: 382 MS
EKG QRS DURATION: 92 MS
EKG QTC CALCULATION (BAZETT): 474 MS
EKG R AXIS: 0 DEGREES
EKG T AXIS: 20 DEGREES
EKG VENTRICULAR RATE: 93 BPM

## 2022-01-28 PROCEDURE — 93010 ELECTROCARDIOGRAM REPORT: CPT | Performed by: INTERNAL MEDICINE

## 2022-01-31 ENCOUNTER — OFFICE VISIT (OUTPATIENT)
Dept: FAMILY MEDICINE CLINIC | Age: 62
End: 2022-01-31

## 2022-01-31 VITALS
WEIGHT: 211 LBS | DIASTOLIC BLOOD PRESSURE: 84 MMHG | BODY MASS INDEX: 29.54 KG/M2 | TEMPERATURE: 97.5 F | HEART RATE: 80 BPM | HEIGHT: 71 IN | SYSTOLIC BLOOD PRESSURE: 138 MMHG

## 2022-01-31 DIAGNOSIS — E87.6 HYPOKALEMIA: Primary | ICD-10-CM

## 2022-01-31 DIAGNOSIS — E87.6 HYPOKALEMIA: ICD-10-CM

## 2022-01-31 PROCEDURE — 99999 PR OFFICE/OUTPT VISIT,PROCEDURE ONLY: CPT | Performed by: FAMILY MEDICINE

## 2022-01-31 NOTE — PATIENT INSTRUCTIONS
Continue the medicine  Do take plenty of fluids  Check the potassium   See me in the office in about 6 weeks

## 2022-01-31 NOTE — PROGRESS NOTES
Subjective:      Patient ID: Efraín Miles is a 65 y.o. male.     Chief Complaint   Patient presents with   Ruddy Griffith     had 2 surgeries - kidney stone removed         Patient presents with:  Check-Up: had 2 surgeries - kidney stone removed     He is actually well   No c/o  He had ER visit  Was low on K  Ablation was successful    He is on the k  He has been taken off the aldactone by other doctors  Was placed on for the adrenal effect by nephrology    YOB: 1960    Date of Visit:  1/31/2022     -- Amoxicillin -- Other (See Comments)    --  Groin area extremely red-\"on fire\"-skin peels off             from neck to feet-over 2 weeks   -- Pcn (Penicillins) -- Other (See Comments)    --  Groin area extremely red and skin peels off from             neck to feet over 2 weeks    Current Outpatient Medications:  dilTIAZem (CARDIZEM CD) 120 MG extended release capsule, Take 1 capsule by mouth daily, Disp: 30 capsule, Rfl: 3  flecainide (TAMBOCOR) 50 MG tablet, Take 1 tablet by mouth 2 times daily, Disp: 60 tablet, Rfl: 3  potassium chloride (KLOR-CON M) 20 MEQ extended release tablet, Take 2 tablets by mouth as needed (Per Potassium Replacement Protocol), Disp: 60 tablet, Rfl: 1  magnesium 200 MG TABS tablet, Take 1 tablet by mouth daily, Disp: 30 tablet, Rfl: 1  apixaban (ELIQUIS) 5 MG TABS tablet, Take 5 mg by mouth 2 times daily, Disp: , Rfl:   tamsulosin (FLOMAX) 0.4 MG capsule, Take 1 capsule by mouth daily, Disp: 30 capsule, Rfl: 0  methocarbamol (ROBAXIN) 500 MG tablet, Take 1 tablet by mouth 4 times daily for 10 days (Patient not taking: Reported on 1/31/2022), Disp: 40 tablet, Rfl: 0  meloxicam (MOBIC) 7.5 MG tablet, Take 1 tablet by mouth daily (Patient not taking: Reported on 1/31/2022), Disp: 90 tablet, Rfl: 1    No current facility-administered medications for this visit.      ---------------------------               01/31/22                      1029 ---------------------------   BP:          138/84         Site:    Left Upper Arm     Position:     Sitting        Cuff Size:  Medium Adult      Pulse:         80           Temp:   97.5 °F (36.4 °C)   TempSrc:    Temporal        Weight: 211 lb (95.7 kg)    Height: 5' 11\" (1.803 m)   ---------------------------  Body mass index is 29.43 kg/m². Wt Readings from Last 3 Encounters:  01/31/22 : 211 lb (95.7 kg)  01/27/22 : 207 lb (93.9 kg)  01/23/22 : 217 lb 9.5 oz (98.7 kg)    BP Readings from Last 3 Encounters:  01/31/22 : 138/84  01/27/22 : (!) 152/89  01/27/22 : (!) 194/107                    Review of Systems    Objective:   Physical Exam  Constitutional:       General: He is not in acute distress. Appearance: Normal appearance. He is well-developed. He is not ill-appearing or diaphoretic. Cardiovascular:      Rate and Rhythm: Normal rate and regular rhythm. Heart sounds: Normal heart sounds. No murmur heard. No friction rub. No gallop. Pulmonary:      Effort: Pulmonary effort is normal. No tachypnea, accessory muscle usage or respiratory distress. Breath sounds: Normal breath sounds. No decreased breath sounds, wheezing, rhonchi or rales. Chest:   Breasts:      Right: No supraclavicular adenopathy. Left: No supraclavicular adenopathy. Lymphadenopathy:      Cervical: No cervical adenopathy. Upper Body:      Right upper body: No supraclavicular adenopathy. Left upper body: No supraclavicular adenopathy. Skin:     General: Skin is warm and dry. Coloration: Skin is not pale. Neurological:      Mental Status: He is alert. Assessment:        Diagnosis Orders   1.  Hypokalemia  POTASSIUM       He has no insurance      Plan:      Continue the medicine  Do take plenty of fluids  Check the potassium   See me in the office in about 6 weeks        Abbie Phillips MD

## 2022-02-01 LAB — POTASSIUM SERPL-SCNC: 3.5 MMOL/L (ref 3.5–5.1)

## 2022-02-14 ENCOUNTER — TELEPHONE (OUTPATIENT)
Dept: CARDIOLOGY CLINIC | Age: 62
End: 2022-02-14

## 2022-02-14 NOTE — TELEPHONE ENCOUNTER
Ceci Donohue is calling in wanting to know if he still needs to complete the echo that was scheduled for him in December. He states that he has had an ablation and didn't know if it was necessary. Ceci Donohue can be reached at [phone].

## 2022-02-15 ENCOUNTER — OFFICE VISIT (OUTPATIENT)
Dept: ENT CLINIC | Age: 62
End: 2022-02-15
Payer: MEDICAID

## 2022-02-15 VITALS
SYSTOLIC BLOOD PRESSURE: 141 MMHG | HEART RATE: 68 BPM | HEIGHT: 71 IN | BODY MASS INDEX: 29.73 KG/M2 | DIASTOLIC BLOOD PRESSURE: 80 MMHG | WEIGHT: 212.4 LBS

## 2022-02-15 DIAGNOSIS — U07.1 PNEUMONIA DUE TO COVID-19 VIRUS: ICD-10-CM

## 2022-02-15 DIAGNOSIS — J31.0 CHRONIC RHINITIS: ICD-10-CM

## 2022-02-15 DIAGNOSIS — I48.91 ATRIAL FIBRILLATION WITH RAPID VENTRICULAR RESPONSE (HCC): ICD-10-CM

## 2022-02-15 DIAGNOSIS — J34.2 DEVIATED NASAL SEPTUM: Primary | ICD-10-CM

## 2022-02-15 DIAGNOSIS — J12.82 PNEUMONIA DUE TO COVID-19 VIRUS: ICD-10-CM

## 2022-02-15 DIAGNOSIS — R04.0 EPISTAXIS: ICD-10-CM

## 2022-02-15 PROCEDURE — 99213 OFFICE O/P EST LOW 20 MIN: CPT | Performed by: STUDENT IN AN ORGANIZED HEALTH CARE EDUCATION/TRAINING PROGRAM

## 2022-02-15 NOTE — PROGRESS NOTES
[facility] NECK SURGERY  CONSULT      Jamie Norris (:  1960) is a 65 y.o. male, here for evaluation of the following chief complaint(s):  Follow-up (nosebleed follow up)      ASSESSMENT/PLAN:  1. Deviated nasal septum  2. Atrial fibrillation with rapid ventricular response ([address]  3. Pneumonia due to COVID-19 virus  4. Chronic rhinitis  5. Epistaxis      This is a very pleasant 65 y.o. male here today for evaluation of the the above-noted complaints. The patient has had resolution of his left-sided epistaxis. I have asked him to continue to use nasal saline gel, adhere to sinus precautions, and to use a humidifier in his room. Medical Decision Making: The following items were considered in medical decision making:  Independent review of images  Review / order clinical lab tests  Review / order radiology tests  Decision to obtain old records  Review and summation of old records as accessed through [facility] if applicable    SUBJECTIVE/OBJECTIVE:  HPI    Jamie Norris is here today for evaluation of issues related to epistaxis. The patient states that he first developed epistaxis in November. He has never had this in the past.  At that time he was diagnosed with A. fib and was placed on Eliquis. Since then he has had 5 nosebleeds. 2 of them of been bad enough that he needed to go to the emergency department. Approximately 2 weeks ago he was in the emergency department for a nosebleed when they cauterized the left side of his nose. He states that the nosebleeds are always on the left. He denies a history of bleeding disorders. He has never been evaluated by an ENT for this before. He gets intermittent nasal obstruction and nasal crusting from his nose. This has been increasing over the last several weeks. He denies loss of sense of smell. Update 2/15/2022:    Patient presents today for follow-up regarding epistaxis.   Since I saw him last he had an ablation and a kidney stone removed. Overall he is feeling very well. He is using nasal saline gel spray in his nose about every other night. He got a humidifier but it broke. REVIEW OF SYSTEMS  The following systems were reviewed and revealed the following in addition to any already discussed in the HPI:    PHYSICAL EXAM    GENERAL: No acute distress, alert and oriented, no hoarseness, strong voice  EYES: EOMI, Anti-icteric  HENT:   Head: Normocephalic and atraumatic. Face:  Symmetric, facial nerve intact  Nose:Normal external nasal appearance. Anterior rhinoscopy revealed no evidence of crusting or prominent blood vessels. PROCEDURE  Nasal Endoscopy (CPT code 51384) from prior visit    Preop: chronic rhinitis  Postop: Same    Verbal consent was received. After topical anesthesia and decongestion had been obtained using aerosolized 1% lidocaine and oxymetazoline, a 45 degree rigid endoscope was placed into both nares with the patient in a sitting position. The following was observed:        Septum: intact and deviated with a bony spur posteriorly on the left  Other:   -The inferior and middle turbinates were examined. The middle meatus, and sphenoethmoid recess was examined bilaterally.    -There was a large amount of crust in the right nasal cavity. This was suctioned from the head of the inferior turbinate and head of the middle turbinate. There is a small area of polypoid change in the middle meatus on the right.   -Site of crusting along the left nasal septum, likely at the site of cauterization.  -There were no complications. Tolerated well without complication. I attest that I was present for and did the entire procedure myself. This note was generated completely or in part utilizing Dragon dictation speech recognition software. Occasionally, words are mistranscribed and despite editing, the text may contain inaccuracies due to incorrect word recognition.   If further clarification is needed please contact the office at [phone]. An electronic signature was used to authenticate this note.     --Russ Mendes MD

## 2022-02-22 ENCOUNTER — HOSPITAL ENCOUNTER (OUTPATIENT)
Dept: NON INVASIVE DIAGNOSTICS | Age: 62
Discharge: HOME | End: 2022-02-22

## 2022-02-22 ENCOUNTER — APPOINTMENT (OUTPATIENT)
Dept: CT IMAGING | Age: 62
End: 2022-02-22

## 2022-02-22 DIAGNOSIS — I48.0 PAF (PAROXYSMAL ATRIAL FIBRILLATION) (HCC): ICD-10-CM

## 2022-02-22 LAB
LV EF: 53 %
LVEF MODALITY: NORMAL

## 2022-02-22 PROCEDURE — 93306 TTE W/DOPPLER COMPLETE: CPT

## 2022-03-01 ENCOUNTER — HOSPITAL ENCOUNTER (OUTPATIENT)
Dept: ULTRASOUND IMAGING | Age: 62
Discharge: HOME | End: 2022-03-01

## 2022-03-01 DIAGNOSIS — N20.0 CALCULUS OF KIDNEY: ICD-10-CM

## 2022-03-01 DIAGNOSIS — N13.2 HYDRONEPHROSIS WITH RENAL AND URETERAL CALCULUS OBSTRUCTION: ICD-10-CM

## 2022-03-01 DIAGNOSIS — N20.1 CALCULUS OF URETER: ICD-10-CM

## 2022-03-01 PROCEDURE — 76770 US EXAM ABDO BACK WALL COMP: CPT

## 2022-03-14 ENCOUNTER — OFFICE VISIT (OUTPATIENT)
Dept: FAMILY MEDICINE CLINIC | Age: 62
End: 2022-03-14

## 2022-03-14 VITALS
TEMPERATURE: 98.1 F | WEIGHT: 213 LBS | HEART RATE: 68 BPM | DIASTOLIC BLOOD PRESSURE: 82 MMHG | HEIGHT: 71 IN | BODY MASS INDEX: 29.82 KG/M2 | SYSTOLIC BLOOD PRESSURE: 134 MMHG

## 2022-03-14 DIAGNOSIS — I10 ESSENTIAL HYPERTENSION: ICD-10-CM

## 2022-03-14 DIAGNOSIS — E87.6 HYPOKALEMIA: Primary | ICD-10-CM

## 2022-03-14 PROCEDURE — 99999 PR OFFICE/OUTPT VISIT,PROCEDURE ONLY: CPT | Performed by: FAMILY MEDICINE

## 2022-03-14 NOTE — PROGRESS NOTES
Subjective:      Patient ID: Tino Mccabe is a 65 y.o. male. Chief Complaint   Patient presents with    Follow-up        Patient presents with: Follow-up    He is overall well  No c/o  No change in medicines  We discussed    YOB: 1960    Date of Visit:  3/14/2022     -- Amoxicillin -- Other (See Comments)    --  Groin area extremely red-\"on fire\"-skin peels off             from neck to feet-over 2 weeks   -- Pcn (Penicillins) -- Other (See Comments)    --  Groin area extremely red and skin peels off from             neck to feet over 2 weeks    Current Outpatient Medications:  rivaroxaban (XARELTO) 20 MG TABS tablet, Take 1 tablet by mouth daily (with breakfast), Disp: 30 tablet, Rfl: 5  dilTIAZem (CARDIZEM CD) 120 MG extended release capsule, Take 1 capsule by mouth daily, Disp: 30 capsule, Rfl: 3  flecainide (TAMBOCOR) 50 MG tablet, Take 1 tablet by mouth 2 times daily, Disp: 60 tablet, Rfl: 3  potassium chloride (KLOR-CON M) 20 MEQ extended release tablet, Take 2 tablets by mouth as needed (Per Potassium Replacement Protocol), Disp: 60 tablet, Rfl: 1  magnesium 200 MG TABS tablet, Take 1 tablet by mouth daily, Disp: 30 tablet, Rfl: 1  tamsulosin (FLOMAX) 0.4 MG capsule, Take 1 capsule by mouth daily, Disp: 30 capsule, Rfl: 0    No current facility-administered medications for this visit.      ---------------------------               03/14/22                      1518         ---------------------------   BP:          134/82         Site:    Left Upper Arm     Position:     Sitting        Cuff Size:   Large Adult      Pulse:         68           Temp:   98.1 °F (36.7 °C)   TempSrc:    Temporal        Weight: 213 lb (96.6 kg)    Height: 5' 11\" (1.803 m)   ---------------------------  Body mass index is 29.71 kg/m².      Wt Readings from Last 3 Encounters:  03/14/22 : 213 lb (96.6 kg)  02/15/22 : 212 lb 6.4 oz (96.3 kg)  01/31/22 : 211 lb (95.7 kg)    BP Readings from Last 3 Encounters:  03/14/22 : 134/82  02/15/22 : (!) 141/80  01/31/22 : 138/84              Review of Systems    Objective:   Physical Exam  Constitutional:       General: He is not in acute distress. Appearance: Normal appearance. He is well-developed. He is not ill-appearing or diaphoretic. Cardiovascular:      Rate and Rhythm: Normal rate and regular rhythm. Heart sounds: Normal heart sounds. No murmur heard. No friction rub. No gallop. Pulmonary:      Effort: Pulmonary effort is normal. No tachypnea, accessory muscle usage or respiratory distress. Breath sounds: Normal breath sounds. No decreased breath sounds, wheezing, rhonchi or rales. Chest:   Breasts:      Right: No supraclavicular adenopathy. Left: No supraclavicular adenopathy. Lymphadenopathy:      Cervical: No cervical adenopathy. Upper Body:      Right upper body: No supraclavicular adenopathy. Left upper body: No supraclavicular adenopathy. Skin:     General: Skin is warm and dry. Coloration: Skin is not pale. Neurological:      Mental Status: He is alert. Assessment:        Diagnosis Orders   1. Hypokalemia  Potassium   2.  Essential hypertension  Potassium       He tells me he was passing the urine well prior to the renal stones  Discussed and will stop flomax when the script is done   No insurance      Plan:      Continue the medicines  Do check the potassium blood work  See me in the summer   After the script of the flomax (tamsulosin) is done with you may stop         Rafa Burns MD

## 2022-03-14 NOTE — PATIENT INSTRUCTIONS
Continue the medicines  Do check the potassium blood work  See me in the summer   After the script of the flomax (tamsulosin) is done with you may stop

## 2022-03-16 DIAGNOSIS — E87.6 HYPOKALEMIA: ICD-10-CM

## 2022-03-16 DIAGNOSIS — I10 ESSENTIAL HYPERTENSION: ICD-10-CM

## 2022-03-16 LAB — POTASSIUM SERPL-SCNC: 3.4 MMOL/L (ref 3.5–5.1)

## 2022-03-24 RX ORDER — POTASSIUM CHLORIDE 20 MEQ/1
TABLET, EXTENDED RELEASE ORAL
Qty: 60 TABLET | Refills: 1 | Status: SHIPPED | OUTPATIENT
Start: 2022-03-24 | End: 2022-04-28

## 2022-03-25 DIAGNOSIS — E87.6 HYPOKALEMIA: Primary | ICD-10-CM

## 2022-04-27 NOTE — PROGRESS NOTES
[id number]   Electrophysiology      Date: 4/28/2022    Primary Cardiologist: Tiffani Yao MD  PCP: Lesly Smart MD     Chief Complaint:   Chief Complaint   Patient presents with    Follow-Up from Hospital     no cc     History of Present Illness:    I saw Crow Branch in the office for electrophysiology follow up today. He is a 65 y.o. male with a past medical history of atrial fibrillation first noted in November 2021 when also diagnosed with Covid. He underwent successful cardioversion on 12/14/21 with Dr. Victor Hugo Hager and has plans for an upcoming atrial fibrillation ablation. He was back in atrial fibrillation when seen in the office on 12/21/21 and his flecainide was increased to 100mg BID. He underwent successful cardioversion on 12/30/21 but was back in atrial fibrillation about 5 minutes after. He ultimately underwent atrial fibrillation ablation (PVI, CAFE) and left atrial flutter (roof line, anterior wall) ablation on 1/27/22 with Dr. Victor Hugo Hager. He was started on flecainide 50mg BID. He presents today for procedure follow up. He has been feeling better since his ablation. He did have one episode almost 2 weeks ago where he got short of breath with activity and his HR was in the 120s. He rested for less than 10 minutes and it resolved. Denies any other palpitations or dyspnea. No chest pain. No syncope. No edema. No bleeding issues. Back to work and all his usual activities without any limitations. Allergies: Allergies   Allergen Reactions    Amoxicillin Other (See Comments)     Groin area extremely red-\"on fire\"-skin peels off from neck to feet-over 2 weeks    Pcn [Penicillins] Other (See Comments)     Groin area extremely red and skin peels off from neck to feet over 2 weeks     Home Medications:  Prior to Visit Medications    Medication Sig Taking?  Authorizing Provider   spironolactone (ALDACTONE) 25 MG tablet Take 1 tablet by mouth daily Yes Miguel Unger, APRN - CNP   potassium chloride (KLOR-CON M) 20 MEQ extended release tablet Take 1 tablet by mouth daily Yes Andreia Guerrero, APRN - CNP   rivaroxaban (XARELTO) 20 MG TABS tablet Take 1 tablet by mouth daily (with breakfast) Yes Aj Vazquez MD   magnesium oxide (MAG-OX) 400 (241.3 Mg) MG TABS tablet TAKE 1/2 TABLET BY MOUTH DAILY Yes Jyothi Schwartz MD   tamsulosin (FLOMAX) 0.4 MG capsule Take 1 capsule by mouth daily Yes Laura Barton PA-C        Past Medical History:  Past Medical History:   Diagnosis Date    A-fib ([facility] [address]     Broken teeth     COVID-19     Hypertension     Kidney stones        Past Surgical History:   Past Surgical History:   Procedure Laterality Date    CARDIOVERSION      x2    CYSTOSCOPY Left 1/21/2022    CYSTOSCOPY, LEFT URETEROSCOPY, HOLMIUM LASER, STONE MANIPULATION performed by Bella Lyons MD at [address] [place] [address]. Left 1/21/2022    LEFT STENT PLACEMENT performed by Bella Lyons MD at [facility]      as a child       Social History:   reports that he has never smoked. He has never used smokeless tobacco. He reports current alcohol use. He reports that he does not use drugs. Family History:      Problem Relation Age of Onset    Diabetes Mother     High Blood Pressure Mother     Heart Disease Brother        Review of Systems   Constitutional: Negative for chills, fatigue, fever and unexpected weight change. HENT: Negative for congestion, hearing loss, sinus pressure, sore throat and trouble swallowing. Respiratory: Positive for shortness of breath (one episode of DAVEY). Negative for cough and wheezing. Cardiovascular: Negative for chest pain, palpitations and leg swelling. Gastrointestinal: Negative for abdominal pain, blood in stool, constipation, diarrhea, nausea and vomiting. Genitourinary: Negative for hematuria. Musculoskeletal: Negative for arthralgias, back pain, gait problem and myalgias. Skin: Negative for color change, rash and wound. Neurological: Negative for dizziness, seizures, syncope, speech difficulty, weakness and light-headedness. Hematological: Does not bruise/bleed easily. Physical Examination:  Vitals:    04/28/22 1354   BP: (!) 148/92   Pulse: 94   SpO2: 98%      Wt Readings from Last 3 Encounters:   04/28/22 210 lb (95.3 kg)   03/14/22 213 lb (96.6 kg)   02/15/22 212 lb 6.4 oz (96.3 kg)       Physical Exam  Vitals reviewed. Constitutional:       General: He is not in acute distress. Appearance: Normal appearance. HENT:      Head: Normocephalic and atraumatic. Nose: Nose normal.      Mouth/Throat:      Mouth: Mucous membranes are moist.   Eyes:      Conjunctiva/sclera: Conjunctivae normal.      Pupils: Pupils are equal, round, and reactive to light. Cardiovascular:      Rate and Rhythm: Normal rate and regular rhythm. Heart sounds: No murmur heard. No friction rub. No gallop. Pulmonary:      Effort: No respiratory distress. Breath sounds: No wheezing, rhonchi or rales. Abdominal:      General: Abdomen is flat. Bowel sounds are normal.      Palpations: Abdomen is soft. Musculoskeletal:         General: Normal range of motion. Right lower leg: No edema. Left lower leg: No edema. Skin:     General: Skin is warm and dry. Findings: No bruising. Neurological:      General: No focal deficit present. Mental Status: He is alert and oriented to person, place, and time. Motor: No weakness.    Psychiatric:         Mood and Affect: Mood normal.         Behavior: Behavior normal.          Pertinent labs, diagnostic, device, and imaging results reviewed as a part of this visit    LABS    CBC:   Lab Results   Component Value Date    WBC 11.5 (H) 01/23/2022    HGB 11.3 (L) 01/23/2022    HCT 34.8 (L) 01/23/2022    MCV 84.3 01/23/2022     01/23/2022     BMP:   Lab Results   Component Value Date    CREATININE 1.1 01/23/2022    BUN 15 01/23/2022     01/23/2022    K 3.4 (L) 2022     2022    CO2 24 2022     Estimated Creatinine Clearance: 83 mL/min (based on SCr of 1.1 mg/dL). No results found for: BNP    Thyroid:   Lab Results   Component Value Date    TSH 2.99 2021     Lipid Panel:   Lab Results   Component Value Date    CHOL 119 2020    HDL 32 2020    TRIG 68 2020     LFTs:  Lab Results   Component Value Date    ALT 23 2022    AST 24 2022    ALKPHOS 77 2022    BILITOT 0.7 2022     Coags:   Lab Results   Component Value Date    PROTIME 11.5 2015    INR 1.06 2015    APTT 25.4 2015       EC2022   SR at 92 BPM.    EP Procedures:   1. Successful DCCV for persistent atrial fibrillation, 21, Dr. Anya Javier  2. Successful DCCV for persistent atrial fibrillation, 21, Dr. Anya Javier  3. Atrial fibrillation ablation (PVI, CAFE) and left atrial flutter (roof line, anterior wall) ablation on 22, Dr. Arndt Ply:    Persistent atrial fibrillation   - first seen on EKG on 21   - s/p successful DCCV on 21    - s/p successful DCCV on 21 but A fib reoccurred within 5 minutes   - s/p A fib (PVI, CAFE) ablation on 22 with Dr. Jenny Sawyer 1 (HTN) on Xarelto 20mg QD   - EKG today with SR   - stop flecainide and Cardizem    Left atrial flutter   - seen on EP study s/p roof line, anterior wall ablation on 22   - see above    Essential HTN   - BP elevated, will change Cardizem to spironolactone as he was on this previously and did well with it controlled BP and has chronically low K, will also decrease KCl to 20meq QD   - BMP in 4 weeks to see what K level is and renal function    Thank you for allowing to us to participate in the care of [facility]. Follow up with BALAJI Justice in 2 months.     BALAJI Justice  [facility] [address] [place] [address], [address] [place] [address], [id number] [facility]  Phone: [phone]  Fax: [phone]    Electronically signed by BALAJI Joyce CNP on 4/28/2022 at 2:52 PM

## 2022-04-28 ENCOUNTER — OFFICE VISIT (OUTPATIENT)
Dept: CARDIOLOGY CLINIC | Age: 62
End: 2022-04-28

## 2022-04-28 VITALS
HEART RATE: 94 BPM | OXYGEN SATURATION: 98 % | HEIGHT: 71 IN | DIASTOLIC BLOOD PRESSURE: 92 MMHG | BODY MASS INDEX: 29.4 KG/M2 | WEIGHT: 210 LBS | SYSTOLIC BLOOD PRESSURE: 148 MMHG

## 2022-04-28 DIAGNOSIS — I10 ESSENTIAL HYPERTENSION: ICD-10-CM

## 2022-04-28 DIAGNOSIS — I48.92 LEFT ATRIAL FLUTTER BY ELECTROCARDIOGRAM (HCC): ICD-10-CM

## 2022-04-28 DIAGNOSIS — I48.19 PERSISTENT ATRIAL FIBRILLATION (HCC): Primary | ICD-10-CM

## 2022-04-28 PROCEDURE — 99214 OFFICE O/P EST MOD 30 MIN: CPT | Performed by: NURSE PRACTITIONER

## 2022-04-28 PROCEDURE — 93000 ELECTROCARDIOGRAM COMPLETE: CPT | Performed by: NURSE PRACTITIONER

## 2022-04-28 RX ORDER — POTASSIUM CHLORIDE 20 MEQ/1
20 TABLET, EXTENDED RELEASE ORAL DAILY
Qty: 60 TABLET | Refills: 1
Start: 2022-04-28 | End: 2022-06-03

## 2022-04-28 RX ORDER — SPIRONOLACTONE 25 MG/1
25 TABLET ORAL DAILY
Qty: 90 TABLET | Refills: 3 | Status: SHIPPED | OUTPATIENT
Start: 2022-04-28

## 2022-04-28 ASSESSMENT — ENCOUNTER SYMPTOMS
BACK PAIN: 0
COUGH: 0
BLOOD IN STOOL: 0
SINUS PRESSURE: 0
COLOR CHANGE: 0
SORE THROAT: 0
NAUSEA: 0
WHEEZING: 0
SHORTNESS OF BREATH: 1
DIARRHEA: 0
CONSTIPATION: 0
ABDOMINAL PAIN: 0
VOMITING: 0
TROUBLE SWALLOWING: 0

## 2022-05-11 DIAGNOSIS — I10 ESSENTIAL HYPERTENSION: ICD-10-CM

## 2022-05-11 DIAGNOSIS — I48.19 PERSISTENT ATRIAL FIBRILLATION (HCC): ICD-10-CM

## 2022-05-11 DIAGNOSIS — I48.92 LEFT ATRIAL FLUTTER BY ELECTROCARDIOGRAM (HCC): ICD-10-CM

## 2022-05-11 LAB
ANION GAP SERPL CALCULATED.3IONS-SCNC: 11 MMOL/L (ref 3–16)
BUN BLDV-MCNC: 11 MG/DL (ref 7–20)
CALCIUM SERPL-MCNC: 9.2 MG/DL (ref 8.3–10.6)
CHLORIDE BLD-SCNC: 103 MMOL/L (ref 99–110)
CO2: 28 MMOL/L (ref 21–32)
CREAT SERPL-MCNC: 1.2 MG/DL (ref 0.8–1.3)
GFR AFRICAN AMERICAN: >60
GFR NON-AFRICAN AMERICAN: >60
GLUCOSE BLD-MCNC: 92 MG/DL (ref 70–99)
POTASSIUM SERPL-SCNC: 3.7 MMOL/L (ref 3.5–5.1)
SODIUM BLD-SCNC: 142 MMOL/L (ref 136–145)

## 2022-05-31 PROCEDURE — 93272 ECG/REVIEW INTERPRET ONLY: CPT | Performed by: NURSE PRACTITIONER

## 2022-05-31 RX ORDER — MAGNESIUM OXIDE TAB 400 MG (241.3 MG ELEMENTAL MG) 400 (241.3 MG) MG
TAB ORAL
Qty: 15 TABLET | Refills: 2 | Status: SHIPPED | OUTPATIENT
Start: 2022-05-31 | End: 2022-09-01 | Stop reason: SDUPTHER

## 2022-06-01 DIAGNOSIS — I48.19 PERSISTENT ATRIAL FIBRILLATION (HCC): ICD-10-CM

## 2022-06-01 DIAGNOSIS — I48.92 LEFT ATRIAL FLUTTER BY ELECTROCARDIOGRAM (HCC): ICD-10-CM

## 2022-06-03 RX ORDER — POTASSIUM CHLORIDE 20 MEQ/1
TABLET, EXTENDED RELEASE ORAL
Qty: 60 TABLET | Refills: 3 | Status: SHIPPED | OUTPATIENT
Start: 2022-06-03 | End: 2022-07-14

## 2022-06-24 NOTE — PROGRESS NOTES
[address]   Electrophysiology      Date: 6/28/2022    Primary Cardiologist: Eda Mclaughlin MD  PCP: Sosa Díaz MD     Chief Complaint:   Chief Complaint   Patient presents with    Follow-up     2mnt no      History of Present Illness:    I saw Francisco Holm in the office for electrophysiology follow up today. He is a 65 y.o. male with a past medical history of atrial fibrillation first noted in November 2021 when also diagnosed with Covid. He underwent successful cardioversion on 12/14/21 with Dr. Inna Nevarez and has plans for an upcoming atrial fibrillation ablation. He was back in atrial fibrillation when seen in the office on 12/21/21 and his flecainide was increased to 100mg BID. He underwent successful cardioversion on 12/30/21 but was back in atrial fibrillation about 5 minutes after. He ultimately underwent atrial fibrillation ablation (PVI, CAFE) and left atrial flutter (roof line, anterior wall) ablation on 1/27/22 with Dr. Inna Nevarez. Flecainide was stopped once he was 3 months out from his ablation and a 30 day monitor was ordered. Monitor showed predominately sinus but with multiple episodes of atrial fibrillation, longest being 1 hour and 19 minutes. He presents today for monitor results. He has been feeling well since his last visit. Denies any palpitations or dyspnea. No chest pain. No syncope or edema. No bleeding issues. Allergies: Allergies   Allergen Reactions    Amoxicillin Other (See Comments)     Groin area extremely red-\"on fire\"-skin peels off from neck to feet-over 2 weeks    Pcn [Penicillins] Other (See Comments)     Groin area extremely red and skin peels off from neck to feet over 2 weeks     Home Medications:  Prior to Visit Medications    Medication Sig Taking?  Authorizing Provider   flecainide (TAMBOCOR) 50 MG tablet Take 1 tablet by mouth 2 times daily Yes Andreia Guerrero, APRN - CNP   potassium chloride (KLOR-CON M) 20 MEQ extended release tablet TAKE 2 TABLETS BY MOUTH DAILY AS NEEDED AS DIRECTED Yes Lesly Smart MD   MAGNESIUM-OXIDE 400 (240 Mg) MG tablet TAKE 1/2 TABLET BY MOUTH DAILY Yes eLsly Smart MD   spironolactone (ALDACTONE) 25 MG tablet Take 1 tablet by mouth daily Yes Miguel Unger, APRN - CNP   rivaroxaban (XARELTO) 20 MG TABS tablet Take 1 tablet by mouth daily (with breakfast) Yes Fawad Hartman MD   tamsulosin (FLOMAX) 0.4 MG capsule Take 1 capsule by mouth daily  Sara Sherwood PA-C        Past Medical History:  Past Medical History:   Diagnosis Date    A-fib [facility])     Broken teeth     COVID-19     Hypertension     Kidney stones        Past Surgical History:   Past Surgical History:   Procedure Laterality Date    CARDIOVERSION      x2    CYSTOSCOPY Left 1/21/2022    CYSTOSCOPY, LEFT URETEROSCOPY, HOLMIUM LASER, STONE MANIPULATION performed by Kamari Marina MD at [facility]. Left 1/21/2022    LEFT STENT PLACEMENT performed by Kamari Marina MD at [address] [place] [address]      as a child       Social History:   reports that he has never smoked. He has never used smokeless tobacco. He reports current alcohol use. He reports that he does not use drugs. Family History:      Problem Relation Age of Onset    Diabetes Mother     High Blood Pressure Mother     Heart Disease Brother        Review of Systems   Constitutional: Negative for chills, fatigue, fever and unexpected weight change. HENT: Negative for congestion, hearing loss, sinus pressure, sore throat and trouble swallowing. Respiratory: Negative for cough, shortness of breath and wheezing. Cardiovascular: Negative for chest pain, palpitations and leg swelling. Gastrointestinal: Negative for abdominal pain, blood in stool, constipation, diarrhea, nausea and vomiting. Genitourinary: Negative for hematuria. Musculoskeletal: Negative for arthralgias, back pain, gait problem and myalgias. Skin: Negative for color change, rash and wound.    Neurological: Negative for dizziness, seizures, syncope, speech difficulty, weakness and light-headedness. Hematological: Does not bruise/bleed easily. Physical Examination:  Vitals:    06/28/22 1530   BP: 130/82   Pulse: 81   SpO2: 97%      Wt Readings from Last 3 Encounters:   06/28/22 210 lb 9.6 oz (95.5 kg)   04/28/22 210 lb (95.3 kg)   03/14/22 213 lb (96.6 kg)       Physical Exam  Vitals reviewed. Constitutional:       General: He is not in acute distress. Appearance: Normal appearance. HENT:      Head: Normocephalic and atraumatic. Nose: Nose normal.      Mouth/Throat:      Mouth: Mucous membranes are moist.   Eyes:      Conjunctiva/sclera: Conjunctivae normal.      Pupils: Pupils are equal, round, and reactive to light. Cardiovascular:      Rate and Rhythm: Normal rate and regular rhythm. Heart sounds: No murmur heard. No friction rub. No gallop. Pulmonary:      Effort: No respiratory distress. Breath sounds: No wheezing, rhonchi or rales. Abdominal:      General: Abdomen is flat. Bowel sounds are normal.      Palpations: Abdomen is soft. Musculoskeletal:         General: Normal range of motion. Right lower leg: No edema. Left lower leg: No edema. Skin:     General: Skin is warm and dry. Findings: No bruising. Neurological:      General: No focal deficit present. Mental Status: He is alert and oriented to person, place, and time. Motor: No weakness.    Psychiatric:         Mood and Affect: Mood normal.         Behavior: Behavior normal.          Pertinent labs, diagnostic, device, and imaging results reviewed as a part of this visit    LABS    CBC:   Lab Results   Component Value Date    WBC 11.5 (H) 01/23/2022    HGB 11.3 (L) 01/23/2022    HCT 34.8 (L) 01/23/2022    MCV 84.3 01/23/2022     01/23/2022     BMP:   Lab Results   Component Value Date    CREATININE 1.2 05/11/2022    BUN 11 05/11/2022     05/11/2022    K 3.7 05/11/2022     2022    CO2 28 2022     Estimated Creatinine Clearance: 76 mL/min (based on SCr of 1.2 mg/dL). No results found for: BNP    Thyroid:   Lab Results   Component Value Date    TSH 2.99 2021     Lipid Panel:   Lab Results   Component Value Date    CHOL 119 2020    HDL 32 2020    TRIG 68 2020     LFTs:  Lab Results   Component Value Date    ALT 23 2022    AST 24 2022    ALKPHOS 77 2022    BILITOT 0.7 2022     Coags:   Lab Results   Component Value Date    PROTIME 11.5 2015    INR 1.06 2015    APTT 25.4 2015       EC2022   SR at 82 BPM. Ventricular bigeminy. EP Procedures:   1. Successful DCCV for persistent atrial fibrillation, 21, Dr. Kasey Duncan  2. Successful DCCV for persistent atrial fibrillation, 21, Dr. Kasey Duncan  3.  Atrial fibrillation ablation (PVI, CAFE) and left atrial flutter (roof line, anterior wall) ablation on 22, Dr. Juan Arboleda:    Persistent atrial fibrillation   - first seen on EKG on 21   - s/p successful DCCV on 21    - s/p successful DCCV on 21 but A fib reoccurred within 5 minutes   - s/p A fib (PVI, CAFE) ablation on 22 with Dr. Lissette Wills 1 (HTN) on Xarelto 20mg QD - discussed ACC guideline with his CHADS2-VASc of 1 and would like to stop Xarelto once out of current prescription    - EKG today with SR   - 30 day monitor showed multiple episodes of sustained atrial fibrillation (3% burden) with longest being 1 hour and 19 minutes   - discussed options including medical therapy or repeat ablation, at this time he would like to repeat ablation, will have to check with financial department as he does not have insurance, resume flecainide 50mg BID as well    Left atrial flutter   - seen on EP study s/p roof line, anterior wall ablation on 22   - see above    Essential HTN   - BP controlled    Thank you for allowing to us to participate in the care of Isaias Jones. Follow up after ablation.     BALAJI Piper  [facility], [address] [place] [address], [address] [place] [address]  Phone: [phone]  Fax: [phone]    Electronically signed by BALAJI Dorsey - CNP on 6/28/2022 at 3:58 PM

## 2022-06-28 ENCOUNTER — OFFICE VISIT (OUTPATIENT)
Dept: CARDIOLOGY CLINIC | Age: 62
End: 2022-06-28

## 2022-06-28 VITALS
HEART RATE: 81 BPM | BODY MASS INDEX: 29.48 KG/M2 | SYSTOLIC BLOOD PRESSURE: 130 MMHG | DIASTOLIC BLOOD PRESSURE: 82 MMHG | WEIGHT: 210.6 LBS | OXYGEN SATURATION: 97 % | HEIGHT: 71 IN

## 2022-06-28 DIAGNOSIS — I48.92 LEFT ATRIAL FLUTTER BY ELECTROCARDIOGRAM (HCC): ICD-10-CM

## 2022-06-28 DIAGNOSIS — I10 ESSENTIAL HYPERTENSION: ICD-10-CM

## 2022-06-28 DIAGNOSIS — I48.19 PERSISTENT ATRIAL FIBRILLATION (HCC): Primary | ICD-10-CM

## 2022-06-28 PROCEDURE — 99214 OFFICE O/P EST MOD 30 MIN: CPT | Performed by: NURSE PRACTITIONER

## 2022-06-28 PROCEDURE — 93000 ELECTROCARDIOGRAM COMPLETE: CPT | Performed by: NURSE PRACTITIONER

## 2022-06-28 RX ORDER — FLECAINIDE ACETATE 50 MG/1
50 TABLET ORAL 2 TIMES DAILY
Qty: 60 TABLET | Refills: 3
Start: 2022-06-28 | End: 2022-10-04 | Stop reason: SDUPTHER

## 2022-06-28 ASSESSMENT — ENCOUNTER SYMPTOMS
COUGH: 0
CONSTIPATION: 0
BACK PAIN: 0
NAUSEA: 0
DIARRHEA: 0
ABDOMINAL PAIN: 0
TROUBLE SWALLOWING: 0
SINUS PRESSURE: 0
SORE THROAT: 0
VOMITING: 0
SHORTNESS OF BREATH: 0
BLOOD IN STOOL: 0
COLOR CHANGE: 0
WHEEZING: 0

## 2022-06-29 ENCOUNTER — TELEPHONE (OUTPATIENT)
Dept: NON INVASIVE DIAGNOSTICS | Age: 62
End: 2022-06-29

## 2022-06-29 DIAGNOSIS — I48.0 PAF (PAROXYSMAL ATRIAL FIBRILLATION) (HCC): Primary | ICD-10-CM

## 2022-06-29 NOTE — TELEPHONE ENCOUNTER
Pt with recurrent atrial fibrillation, wants to pursue repeat ablation but is self pay and will need to talk to financial counselor first. Can you provide him that number? Thanks!

## 2022-06-29 NOTE — TELEPHONE ENCOUNTER
Please reach out to patient and instruct him to reach out to  Merary Lay, Public  with [facility]) at  [phone]. [employer] and she will assist him in seeing if he qualifies for patient assistance  to help cover the cost of the procedure. Once he reaches out to her he can call back to schedule his repeat atrial fibrillation ablation with Dr. Stuart Vidales. Atrial Fibrillation Ablation Pre procedure Instructions    Date:______________________________    Arrive at:__________________________    Procedure time:____________________    The morning of your procedure you will park in the hospital parking lot and report directly to the cath lab to check in. At the information desk stay right and go all the way to the end of the walter, this will take you directly to your check in desk for the cath lab. Pre-Procedure Instructions   1. You will need to fast (nothing to eat or drink) after midnight the day of your procedure  2. Do NOT chew gum or eat mints the day of your procedure. 3. You will need to hold your Flecainide for 7 days prior to the procedure. 4. You will need to hold your Xarelto for 12 hours prior to the procedure. 5. If you are a diabetic you will need to hold all diabetic medications including, Metformin the morning of the procedure. If you take Lantus/Levemir only take ½ your normal dose the evening before. 6. Do not use any lotions, creams or perfume the morning of procedure. 7. You will need to complete pre-procedure lab work 5-7 days prior to your procedure. 8. Please have a responsible adult to drive you home after procedure, you should go home same day, but there is always a possibility of an overnight stay. 9. Cath lab will provide you with all post procedure instructions  10.  A 3 month follow up will be scheduled with Dr. Syeda Baker Nurse practitioner Joe Bar CNP post procedure                                        [facility] services  [address] [place] [address]. [address] [place] [address]  Phone: [phone]  The hours are Mon -Fri.   6:30 am - 4:00 pm   Saturday 8:00 am - noon  No appointment necessary

## 2022-06-29 NOTE — LETTER
[facility] [address]  [address] [facility]  Phone: [phone]    July 1, 2022    Lew Calhoun  [address]      Dear Nella Barrera:      Atrial Fibrillation Ablation Pre procedure Instructions     Date: 7/28/22     Arrive at: 1:00 PM     Procedure time: 11:30 AM     The morning of your procedure you will park in the hospital parking lot and report directly to the cath lab to check in. At the information desk stay right and go all the way to the end of the walter, this will take you directly to your check in desk for the cath lab.     Pre-Procedure Instructions   1. You will need to fast (nothing to eat or drink) after midnight the day of your procedure  2. Do NOT chew gum or eat mints the day of your procedure. 3. You will need to hold your Flecainide for 7 days prior to the procedure. 4. You will need to hold your Xarelto for 12 hours prior to the procedure. 5. If you are a diabetic you will need to hold all diabetic medications including, Metformin the morning of the procedure. If you take Lantus/Levemir only take ½ your normal dose the evening before. 6. Do not use any lotions, creams or perfume the morning of procedure. 7. You will need to complete pre-procedure lab work 5-7 days prior to your procedure. 8. Please have a responsible adult to drive you home after procedure, you should go home same day, but there is always a possibility of an overnight stay. 9. Cath lab will provide you with all post procedure instructions  10. A 3 month follow up will be scheduled with Dr. Srinivas Garcia Nurse practitioner Harleen Narvaez CNP post procedure            [facility]     [facility]  [address] [place] [address]. [address], Jax Sequeira [facility]  Phone: [phone]  The hours are Mon -Fri.   6:30 am  4:00 pm   Saturday 8:00 am  noon  No appointment necessary            If you have any questions or concerns, please don't hesitate to call.     Sincerely,    Ranjith Sauer

## 2022-07-01 NOTE — TELEPHONE ENCOUNTER
Pt wanted procedure to be on a Thursday. Procedure is scheduled on 7/28/22 at 1:00 pm. Arrival time is 11:30 am. Pt is agreeable to date/time. Went over pre-procedure instructions. Pt v/u. Published on [place] and e-mail to Giovanna Silva.

## 2022-07-14 ENCOUNTER — OFFICE VISIT (OUTPATIENT)
Dept: FAMILY MEDICINE CLINIC | Age: 62
End: 2022-07-14

## 2022-07-14 VITALS
BODY MASS INDEX: 29.68 KG/M2 | HEIGHT: 71 IN | DIASTOLIC BLOOD PRESSURE: 84 MMHG | TEMPERATURE: 98.2 F | WEIGHT: 212 LBS | HEART RATE: 80 BPM | SYSTOLIC BLOOD PRESSURE: 138 MMHG

## 2022-07-14 DIAGNOSIS — Z12.5 PROSTATE CANCER SCREENING: ICD-10-CM

## 2022-07-14 DIAGNOSIS — L98.9 SKIN LESION OF FACE: ICD-10-CM

## 2022-07-14 DIAGNOSIS — E87.6 HYPOKALEMIA: ICD-10-CM

## 2022-07-14 DIAGNOSIS — I15.2 HYPERTENSION DUE TO ENDOCRINE DISORDER: Primary | ICD-10-CM

## 2022-07-14 PROCEDURE — 99999 PR OFFICE/OUTPT VISIT,PROCEDURE ONLY: CPT | Performed by: FAMILY MEDICINE

## 2022-07-14 RX ORDER — POTASSIUM CHLORIDE 20 MEQ/1
20 TABLET, EXTENDED RELEASE ORAL DAILY
Qty: 60 TABLET | Refills: 3
Start: 2022-07-14

## 2022-07-14 SDOH — ECONOMIC STABILITY: TRANSPORTATION INSECURITY
IN THE PAST 12 MONTHS, HAS LACK OF TRANSPORTATION KEPT YOU FROM MEETINGS, WORK, OR FROM GETTING THINGS NEEDED FOR DAILY LIVING?: NO

## 2022-07-14 SDOH — ECONOMIC STABILITY: FOOD INSECURITY: WITHIN THE PAST 12 MONTHS, YOU WORRIED THAT YOUR FOOD WOULD RUN OUT BEFORE YOU GOT MONEY TO BUY MORE.: NEVER TRUE

## 2022-07-14 SDOH — ECONOMIC STABILITY: FOOD INSECURITY: WITHIN THE PAST 12 MONTHS, THE FOOD YOU BOUGHT JUST DIDN'T LAST AND YOU DIDN'T HAVE MONEY TO GET MORE.: NEVER TRUE

## 2022-07-14 SDOH — ECONOMIC STABILITY: TRANSPORTATION INSECURITY
IN THE PAST 12 MONTHS, HAS THE LACK OF TRANSPORTATION KEPT YOU FROM MEDICAL APPOINTMENTS OR FROM GETTING MEDICATIONS?: NO

## 2022-07-14 ASSESSMENT — ENCOUNTER SYMPTOMS
CONSTIPATION: 0
BLOOD IN STOOL: 0
DIARRHEA: 0
ABDOMINAL PAIN: 0
SHORTNESS OF BREATH: 0

## 2022-07-14 ASSESSMENT — PATIENT HEALTH QUESTIONNAIRE - PHQ9
SUM OF ALL RESPONSES TO PHQ QUESTIONS 1-9: 0
SUM OF ALL RESPONSES TO PHQ QUESTIONS 1-9: 0
SUM OF ALL RESPONSES TO PHQ9 QUESTIONS 1 & 2: 0
1. LITTLE INTEREST OR PLEASURE IN DOING THINGS: 0
SUM OF ALL RESPONSES TO PHQ QUESTIONS 1-9: 0
2. FEELING DOWN, DEPRESSED OR HOPELESS: 0
SUM OF ALL RESPONSES TO PHQ QUESTIONS 1-9: 0

## 2022-07-14 ASSESSMENT — SOCIAL DETERMINANTS OF HEALTH (SDOH): HOW HARD IS IT FOR YOU TO PAY FOR THE VERY BASICS LIKE FOOD, HOUSING, MEDICAL CARE, AND HEATING?: NOT HARD AT ALL

## 2022-07-14 NOTE — PROGRESS NOTES
Subjective:      Patient ID: Gwen Prabhakar is a 65 y.o. male. Chief Complaint   Patient presents with    Follow-up        Patient presents with: Follow-up    He is here for a check  He is to have a repeat ablation  She has not been taking full dose of flecainide  I have that he should be on 50 bid but only taking one half bid as he had been used to halving the 100 mg tabs    Skin lesion on nose for 1.5 year     YOB: 1960    Date of Visit:  7/14/2022     -- Amoxicillin -- Other (See Comments)    --  Groin area extremely red-\"on fire\"-skin peels off             from neck to feet-over 2 weeks   -- Pcn (Penicillins) -- Other (See Comments)    --  Groin area extremely red and skin peels off from             neck to feet over 2 weeks    Current Outpatient Medications:  potassium chloride (KLOR-CON M) 20 MEQ extended release tablet, Take 1 tablet by mouth daily, Disp: 60 tablet, Rfl: 3  flecainide (TAMBOCOR) 50 MG tablet, Take 1 tablet by mouth 2 times daily, Disp: 60 tablet, Rfl: 3  MAGNESIUM-OXIDE 400 (240 Mg) MG tablet, TAKE 1/2 TABLET BY MOUTH DAILY, Disp: 15 tablet, Rfl: 2  spironolactone (ALDACTONE) 25 MG tablet, Take 1 tablet by mouth daily, Disp: 90 tablet, Rfl: 3  rivaroxaban (XARELTO) 20 MG TABS tablet, Take 1 tablet by mouth daily (with breakfast), Disp: 30 tablet, Rfl: 5  tamsulosin (FLOMAX) 0.4 MG capsule, Take 1 capsule by mouth daily, Disp: 30 capsule, Rfl: 0    No current facility-administered medications for this visit.      ---------------------------               07/14/22                      1522         ---------------------------   BP:          138/84         Site:    Left Upper Arm     Position:     Sitting        Cuff Size:   Large Adult      Pulse:         80           Temp:   98.2 °F (36.8 °C)   TempSrc:    Temporal        Weight: 212 lb (96.2 kg)    Height: 5' 11\" (1.803 m)   ---------------------------  Body mass index is 29.57 kg/m². Wt Readings from Last 3 Encounters:  07/14/22 : 212 lb (96.2 kg)  06/28/22 : 210 lb 9.6 oz (95.5 kg)  04/28/22 : 210 lb (95.3 kg)    BP Readings from Last 3 Encounters:  07/14/22 : 138/84  06/28/22 : 130/82  04/28/22 : (!) 148/92        Review of Systems   Constitutional: Negative for chills and fever. Respiratory: Negative for shortness of breath. Cardiovascular: Negative for chest pain and palpitations. Gastrointestinal: Negative for abdominal pain, blood in stool, constipation and diarrhea. Genitourinary: Negative for difficulty urinating, dysuria and hematuria. Neurological: Negative for headaches. Objective:   Physical Exam  Constitutional:       General: He is not in acute distress. Appearance: Normal appearance. He is well-developed. He is not ill-appearing or diaphoretic. HENT:      Head: Normocephalic and atraumatic. Nose:      Comments: Small piece of keratin tissue at the tip of the nose 1 mm   Cardiovascular:      Rate and Rhythm: Normal rate and regular rhythm. Heart sounds: Normal heart sounds. No murmur heard. No friction rub. No gallop. Pulmonary:      Effort: Pulmonary effort is normal. No tachypnea, accessory muscle usage or respiratory distress. Breath sounds: Normal breath sounds. No decreased breath sounds, wheezing, rhonchi or rales. Chest:   Breasts:      Right: No supraclavicular adenopathy. Left: No supraclavicular adenopathy. Lymphadenopathy:      Cervical: No cervical adenopathy. Upper Body:      Right upper body: No supraclavicular adenopathy. Left upper body: No supraclavicular adenopathy. Skin:     General: Skin is warm and dry. Coloration: Skin is not pale. Neurological:      Mental Status: He is alert. Assessment:       .   Diagnosis Orders   1. Hypertension due to endocrine disorder     2. Hypokalemia     3. Skin lesion of face  Amb External Referral To Dermatology   4.  Prostate cancer screening PSA Screening         He will be getting the repeat blood done in about a week or so   We discussed the possibility of adolfo and he did not want to pursue   He had declined to have colon and prostate check and also adolfo check on 8/23/19      Plan:      Continue the medicine  Do the labs  See [place] [facility]   See back in about 6 months        Alfred Thibodeaux MD

## 2022-07-26 DIAGNOSIS — Z12.5 PROSTATE CANCER SCREENING: ICD-10-CM

## 2022-07-26 DIAGNOSIS — I48.0 PAF (PAROXYSMAL ATRIAL FIBRILLATION) (HCC): ICD-10-CM

## 2022-07-26 LAB
ABO/RH: NORMAL
ANION GAP SERPL CALCULATED.3IONS-SCNC: 12 MMOL/L (ref 3–16)
ANTIBODY SCREEN: NORMAL
BUN BLDV-MCNC: 13 MG/DL (ref 7–20)
CALCIUM SERPL-MCNC: 9 MG/DL (ref 8.3–10.6)
CHLORIDE BLD-SCNC: 104 MMOL/L (ref 99–110)
CO2: 25 MMOL/L (ref 21–32)
CREAT SERPL-MCNC: 1.1 MG/DL (ref 0.8–1.3)
GFR AFRICAN AMERICAN: >60
GFR NON-AFRICAN AMERICAN: >60
GLUCOSE BLD-MCNC: 92 MG/DL (ref 70–99)
HCT VFR BLD CALC: 38.8 % (ref 40.5–52.5)
HEMOGLOBIN: 13.2 G/DL (ref 13.5–17.5)
MCH RBC QN AUTO: 26.2 PG (ref 26–34)
MCHC RBC AUTO-ENTMCNC: 33.9 G/DL (ref 31–36)
MCV RBC AUTO: 77.3 FL (ref 80–100)
PDW BLD-RTO: 17.6 % (ref 12.4–15.4)
PLATELET # BLD: 205 K/UL (ref 135–450)
PMV BLD AUTO: 9 FL (ref 5–10.5)
POTASSIUM SERPL-SCNC: 4.7 MMOL/L (ref 3.5–5.1)
PROSTATE SPECIFIC ANTIGEN: 3.36 NG/ML (ref 0–4)
RBC # BLD: 5.03 M/UL (ref 4.2–5.9)
SODIUM BLD-SCNC: 141 MMOL/L (ref 136–145)
WBC # BLD: 5.9 K/UL (ref 4–11)

## 2022-07-27 ENCOUNTER — ANESTHESIA EVENT (OUTPATIENT)
Dept: CARDIAC CATH/INVASIVE PROCEDURES | Age: 62
End: 2022-07-27

## 2022-07-28 ENCOUNTER — ANESTHESIA (OUTPATIENT)
Dept: CARDIAC CATH/INVASIVE PROCEDURES | Age: 62
End: 2022-07-28

## 2022-07-28 ENCOUNTER — HOSPITAL ENCOUNTER (OUTPATIENT)
Dept: CARDIAC CATH/INVASIVE PROCEDURES | Age: 62
Discharge: HOME | End: 2022-07-28

## 2022-07-28 VITALS
RESPIRATION RATE: 18 BRPM | BODY MASS INDEX: 29.68 KG/M2 | OXYGEN SATURATION: 95 % | DIASTOLIC BLOOD PRESSURE: 99 MMHG | WEIGHT: 212 LBS | SYSTOLIC BLOOD PRESSURE: 153 MMHG | HEART RATE: 101 BPM | HEIGHT: 71 IN | TEMPERATURE: 97.3 F

## 2022-07-28 LAB
ABO/RH: NORMAL
ANTIBODY SCREEN: NORMAL
POC ACT LR: 255 SEC
POC ACT LR: 327 SEC
POC ACT LR: 340 SEC
POC ACT LR: >400 SEC
POC ACT LR: >400 SEC

## 2022-07-28 PROCEDURE — 2500000003 HC RX 250 WO HCPCS: Performed by: ANESTHESIOLOGY

## 2022-07-28 PROCEDURE — 93320 DOPPLER ECHO COMPLETE: CPT

## 2022-07-28 PROCEDURE — 93623 PRGRMD STIMJ&PACG IV RX NFS: CPT

## 2022-07-28 PROCEDURE — C1893 INTRO/SHEATH, FIXED,NON-PEEL: HCPCS

## 2022-07-28 PROCEDURE — 86850 RBC ANTIBODY SCREEN: CPT

## 2022-07-28 PROCEDURE — C1732 CATH, EP, DIAG/ABL, 3D/VECT: HCPCS

## 2022-07-28 PROCEDURE — A4216 STERILE WATER/SALINE, 10 ML: HCPCS

## 2022-07-28 PROCEDURE — C1730 CATH, EP, 19 OR FEW ELECT: HCPCS

## 2022-07-28 PROCEDURE — 2500000003 HC RX 250 WO HCPCS

## 2022-07-28 PROCEDURE — 93622 COMP EP EVAL L VENTR PAC&REC: CPT

## 2022-07-28 PROCEDURE — 93312 ECHO TRANSESOPHAGEAL: CPT

## 2022-07-28 PROCEDURE — 93655 ICAR CATH ABLTJ DSCRT ARRHYT: CPT

## 2022-07-28 PROCEDURE — 2500000003 HC RX 250 WO HCPCS: Performed by: NURSE ANESTHETIST, CERTIFIED REGISTERED

## 2022-07-28 PROCEDURE — 7100000000 HC PACU RECOVERY - FIRST 15 MIN

## 2022-07-28 PROCEDURE — 2580000003 HC RX 258: Performed by: NURSE ANESTHETIST, CERTIFIED REGISTERED

## 2022-07-28 PROCEDURE — 93655 ICAR CATH ABLTJ DSCRT ARRHYT: CPT | Performed by: INTERNAL MEDICINE

## 2022-07-28 PROCEDURE — 3700000001 HC ADD 15 MINUTES (ANESTHESIA)

## 2022-07-28 PROCEDURE — 93325 DOPPLER ECHO COLOR FLOW MAPG: CPT

## 2022-07-28 PROCEDURE — 86900 BLOOD TYPING SEROLOGIC ABO: CPT

## 2022-07-28 PROCEDURE — 6360000002 HC RX W HCPCS

## 2022-07-28 PROCEDURE — 6370000000 HC RX 637 (ALT 250 FOR IP): Performed by: INTERNAL MEDICINE

## 2022-07-28 PROCEDURE — 93622 COMP EP EVAL L VENTR PAC&REC: CPT | Performed by: INTERNAL MEDICINE

## 2022-07-28 PROCEDURE — 7100000001 HC PACU RECOVERY - ADDTL 15 MIN

## 2022-07-28 PROCEDURE — 93656 COMPRE EP EVAL ABLTJ ATR FIB: CPT

## 2022-07-28 PROCEDURE — 93656 COMPRE EP EVAL ABLTJ ATR FIB: CPT | Performed by: INTERNAL MEDICINE

## 2022-07-28 PROCEDURE — C1894 INTRO/SHEATH, NON-LASER: HCPCS

## 2022-07-28 PROCEDURE — 2709999900 HC NON-CHARGEABLE SUPPLY

## 2022-07-28 PROCEDURE — 93623 PRGRMD STIMJ&PACG IV RX NFS: CPT | Performed by: INTERNAL MEDICINE

## 2022-07-28 PROCEDURE — 6360000002 HC RX W HCPCS: Performed by: NURSE ANESTHETIST, CERTIFIED REGISTERED

## 2022-07-28 PROCEDURE — 86901 BLOOD TYPING SEROLOGIC RH(D): CPT

## 2022-07-28 PROCEDURE — 93005 ELECTROCARDIOGRAM TRACING: CPT | Performed by: INTERNAL MEDICINE

## 2022-07-28 PROCEDURE — 93657 TX L/R ATRIAL FIB ADDL: CPT

## 2022-07-28 PROCEDURE — A4216 STERILE WATER/SALINE, 10 ML: HCPCS | Performed by: NURSE ANESTHETIST, CERTIFIED REGISTERED

## 2022-07-28 PROCEDURE — 3700000000 HC ANESTHESIA ATTENDED CARE

## 2022-07-28 PROCEDURE — 2580000003 HC RX 258

## 2022-07-28 PROCEDURE — 93657 TX L/R ATRIAL FIB ADDL: CPT | Performed by: INTERNAL MEDICINE

## 2022-07-28 PROCEDURE — 6370000000 HC RX 637 (ALT 250 FOR IP)

## 2022-07-28 PROCEDURE — C1759 CATH, INTRA ECHOCARDIOGRAPHY: HCPCS

## 2022-07-28 PROCEDURE — 85347 COAGULATION TIME ACTIVATED: CPT

## 2022-07-28 PROCEDURE — 2720000010 HC SURG SUPPLY STERILE

## 2022-07-28 PROCEDURE — 93312 ECHO TRANSESOPHAGEAL: CPT | Performed by: INTERNAL MEDICINE

## 2022-07-28 RX ORDER — HEPARIN SODIUM 10000 [USP'U]/100ML
INJECTION, SOLUTION INTRAVENOUS CONTINUOUS PRN
Status: DISCONTINUED | OUTPATIENT
Start: 2022-07-28 | End: 2022-07-28 | Stop reason: SDUPTHER

## 2022-07-28 RX ORDER — ONDANSETRON 2 MG/ML
INJECTION INTRAMUSCULAR; INTRAVENOUS PRN
Status: DISCONTINUED | OUTPATIENT
Start: 2022-07-28 | End: 2022-07-28 | Stop reason: SDUPTHER

## 2022-07-28 RX ORDER — SODIUM CHLORIDE 9 MG/ML
INJECTION, SOLUTION INTRAVENOUS CONTINUOUS PRN
Status: DISCONTINUED | OUTPATIENT
Start: 2022-07-28 | End: 2022-07-28 | Stop reason: SDUPTHER

## 2022-07-28 RX ORDER — ACETAMINOPHEN 325 MG/1
650 TABLET ORAL EVERY 4 HOURS PRN
Status: DISCONTINUED | OUTPATIENT
Start: 2022-07-28 | End: 2022-07-29 | Stop reason: HOSPADM

## 2022-07-28 RX ORDER — FENTANYL CITRATE 50 UG/ML
INJECTION, SOLUTION INTRAMUSCULAR; INTRAVENOUS PRN
Status: DISCONTINUED | OUTPATIENT
Start: 2022-07-28 | End: 2022-07-28 | Stop reason: SDUPTHER

## 2022-07-28 RX ORDER — SODIUM CHLORIDE 0.9 % (FLUSH) 0.9 %
5-40 SYRINGE (ML) INJECTION EVERY 12 HOURS SCHEDULED
Status: DISCONTINUED | OUTPATIENT
Start: 2022-07-28 | End: 2022-07-29 | Stop reason: HOSPADM

## 2022-07-28 RX ORDER — PROTAMINE SULFATE 10 MG/ML
30 INJECTION, SOLUTION INTRAVENOUS
Status: ACTIVE | OUTPATIENT
Start: 2022-07-28 | End: 2022-07-28

## 2022-07-28 RX ORDER — PROTAMINE SULFATE 10 MG/ML
INJECTION, SOLUTION INTRAVENOUS PRN
Status: DISCONTINUED | OUTPATIENT
Start: 2022-07-28 | End: 2022-07-28 | Stop reason: SDUPTHER

## 2022-07-28 RX ORDER — SODIUM CHLORIDE 0.9 % (FLUSH) 0.9 %
5-40 SYRINGE (ML) INJECTION PRN
Status: DISCONTINUED | OUTPATIENT
Start: 2022-07-28 | End: 2022-07-29 | Stop reason: HOSPADM

## 2022-07-28 RX ORDER — METOPROLOL SUCCINATE 25 MG/1
12.5 TABLET, EXTENDED RELEASE ORAL DAILY
Qty: 30 TABLET | Refills: 3 | Status: SHIPPED | OUTPATIENT
Start: 2022-07-29 | End: 2022-10-31 | Stop reason: ALTCHOICE

## 2022-07-28 RX ORDER — SPIRONOLACTONE 25 MG/1
25 TABLET ORAL DAILY
Status: DISCONTINUED | OUTPATIENT
Start: 2022-07-28 | End: 2022-07-29 | Stop reason: HOSPADM

## 2022-07-28 RX ORDER — SODIUM CHLORIDE 9 MG/ML
INJECTION, SOLUTION INTRAVENOUS PRN
Status: DISCONTINUED | OUTPATIENT
Start: 2022-07-28 | End: 2022-07-29 | Stop reason: HOSPADM

## 2022-07-28 RX ORDER — SUCCINYLCHOLINE/SOD CL,ISO/PF 200MG/10ML
SYRINGE (ML) INTRAVENOUS PRN
Status: DISCONTINUED | OUTPATIENT
Start: 2022-07-28 | End: 2022-07-28 | Stop reason: SDUPTHER

## 2022-07-28 RX ORDER — LABETALOL HYDROCHLORIDE 5 MG/ML
5 INJECTION, SOLUTION INTRAVENOUS ONCE
Status: COMPLETED | OUTPATIENT
Start: 2022-07-28 | End: 2022-07-28

## 2022-07-28 RX ORDER — DOBUTAMINE HYDROCHLORIDE 200 MG/100ML
INJECTION INTRAVENOUS CONTINUOUS PRN
Status: DISCONTINUED | OUTPATIENT
Start: 2022-07-28 | End: 2022-07-28 | Stop reason: SDUPTHER

## 2022-07-28 RX ORDER — ONDANSETRON 2 MG/ML
4 INJECTION INTRAMUSCULAR; INTRAVENOUS
Status: ACTIVE | OUTPATIENT
Start: 2022-07-28 | End: 2022-07-28

## 2022-07-28 RX ORDER — TAMSULOSIN HYDROCHLORIDE 0.4 MG/1
0.4 CAPSULE ORAL DAILY
Status: DISCONTINUED | OUTPATIENT
Start: 2022-07-28 | End: 2022-07-29 | Stop reason: HOSPADM

## 2022-07-28 RX ORDER — METOPROLOL SUCCINATE 25 MG/1
12.5 TABLET, EXTENDED RELEASE ORAL DAILY
Status: DISCONTINUED | OUTPATIENT
Start: 2022-07-28 | End: 2022-07-29 | Stop reason: HOSPADM

## 2022-07-28 RX ORDER — SODIUM CHLORIDE 9 MG/ML
INJECTION, SOLUTION INTRAVENOUS PRN
Status: DISCONTINUED | OUTPATIENT
Start: 2022-07-28 | End: 2022-07-28

## 2022-07-28 RX ORDER — SODIUM CHLORIDE 9 MG/ML
INJECTION INTRAVENOUS PRN
Status: DISCONTINUED | OUTPATIENT
Start: 2022-07-28 | End: 2022-07-28 | Stop reason: SDUPTHER

## 2022-07-28 RX ORDER — FENTANYL CITRATE 50 UG/ML
25 INJECTION, SOLUTION INTRAMUSCULAR; INTRAVENOUS EVERY 5 MIN PRN
Status: DISCONTINUED | OUTPATIENT
Start: 2022-07-28 | End: 2022-07-29 | Stop reason: HOSPADM

## 2022-07-28 RX ORDER — LIDOCAINE HYDROCHLORIDE 20 MG/ML
INJECTION, SOLUTION EPIDURAL; INFILTRATION; INTRACAUDAL; PERINEURAL PRN
Status: DISCONTINUED | OUTPATIENT
Start: 2022-07-28 | End: 2022-07-28 | Stop reason: SDUPTHER

## 2022-07-28 RX ORDER — PROPOFOL 10 MG/ML
INJECTION, EMULSION INTRAVENOUS PRN
Status: DISCONTINUED | OUTPATIENT
Start: 2022-07-28 | End: 2022-07-28 | Stop reason: SDUPTHER

## 2022-07-28 RX ORDER — FLECAINIDE ACETATE 100 MG/1
50 TABLET ORAL 2 TIMES DAILY
Status: DISCONTINUED | OUTPATIENT
Start: 2022-07-28 | End: 2022-07-29 | Stop reason: HOSPADM

## 2022-07-28 RX ORDER — DEXAMETHASONE SODIUM PHOSPHATE 4 MG/ML
INJECTION, SOLUTION INTRA-ARTICULAR; INTRALESIONAL; INTRAMUSCULAR; INTRAVENOUS; SOFT TISSUE PRN
Status: DISCONTINUED | OUTPATIENT
Start: 2022-07-28 | End: 2022-07-28 | Stop reason: SDUPTHER

## 2022-07-28 RX ORDER — SODIUM CHLORIDE 0.9 % (FLUSH) 0.9 %
5-40 SYRINGE (ML) INJECTION EVERY 12 HOURS SCHEDULED
Status: DISCONTINUED | OUTPATIENT
Start: 2022-07-28 | End: 2022-07-28 | Stop reason: SDUPTHER

## 2022-07-28 RX ORDER — SODIUM CHLORIDE 0.9 % (FLUSH) 0.9 %
5-40 SYRINGE (ML) INJECTION PRN
Status: DISCONTINUED | OUTPATIENT
Start: 2022-07-28 | End: 2022-07-28 | Stop reason: SDUPTHER

## 2022-07-28 RX ORDER — DIPHENHYDRAMINE HYDROCHLORIDE 50 MG/ML
INJECTION INTRAMUSCULAR; INTRAVENOUS PRN
Status: DISCONTINUED | OUTPATIENT
Start: 2022-07-28 | End: 2022-07-28 | Stop reason: SDUPTHER

## 2022-07-28 RX ORDER — VECURONIUM BROMIDE 1 MG/ML
INJECTION, POWDER, LYOPHILIZED, FOR SOLUTION INTRAVENOUS PRN
Status: DISCONTINUED | OUTPATIENT
Start: 2022-07-28 | End: 2022-07-28 | Stop reason: SDUPTHER

## 2022-07-28 RX ORDER — 0.9 % SODIUM CHLORIDE 0.9 %
1000 INTRAVENOUS SOLUTION INTRAVENOUS
Status: ACTIVE | OUTPATIENT
Start: 2022-07-28 | End: 2022-07-28

## 2022-07-28 RX ORDER — LIDOCAINE HYDROCHLORIDE AND EPINEPHRINE BITARTRATE 20; .01 MG/ML; MG/ML
15 INJECTION, SOLUTION SUBCUTANEOUS SEE ADMIN INSTRUCTIONS
Status: DISCONTINUED | OUTPATIENT
Start: 2022-07-28 | End: 2022-07-29 | Stop reason: HOSPADM

## 2022-07-28 RX ORDER — SODIUM CHLORIDE 0.9 % (FLUSH) 0.9 %
5-40 SYRINGE (ML) INJECTION PRN
Status: DISCONTINUED | OUTPATIENT
Start: 2022-07-28 | End: 2022-07-28

## 2022-07-28 RX ORDER — MIDAZOLAM HYDROCHLORIDE 1 MG/ML
INJECTION INTRAMUSCULAR; INTRAVENOUS PRN
Status: DISCONTINUED | OUTPATIENT
Start: 2022-07-28 | End: 2022-07-28 | Stop reason: SDUPTHER

## 2022-07-28 RX ORDER — FUROSEMIDE 10 MG/ML
INJECTION INTRAMUSCULAR; INTRAVENOUS PRN
Status: DISCONTINUED | OUTPATIENT
Start: 2022-07-28 | End: 2022-07-28 | Stop reason: SDUPTHER

## 2022-07-28 RX ORDER — HEPARIN SODIUM 1000 [USP'U]/ML
INJECTION, SOLUTION INTRAVENOUS; SUBCUTANEOUS PRN
Status: DISCONTINUED | OUTPATIENT
Start: 2022-07-28 | End: 2022-07-28 | Stop reason: SDUPTHER

## 2022-07-28 RX ORDER — DROPERIDOL 2.5 MG/ML
0.62 INJECTION, SOLUTION INTRAMUSCULAR; INTRAVENOUS
Status: ACTIVE | OUTPATIENT
Start: 2022-07-28 | End: 2022-07-28

## 2022-07-28 RX ORDER — OXYCODONE HYDROCHLORIDE 5 MG/1
5 TABLET ORAL
Status: ACTIVE | OUTPATIENT
Start: 2022-07-28 | End: 2022-07-28

## 2022-07-28 RX ORDER — GLYCOPYRROLATE 0.2 MG/ML
INJECTION INTRAMUSCULAR; INTRAVENOUS PRN
Status: DISCONTINUED | OUTPATIENT
Start: 2022-07-28 | End: 2022-07-28 | Stop reason: SDUPTHER

## 2022-07-28 RX ADMIN — MIDAZOLAM 2 MG: 1 INJECTION INTRAMUSCULAR; INTRAVENOUS at 12:15

## 2022-07-28 RX ADMIN — Medication 120 MG: at 12:19

## 2022-07-28 RX ADMIN — VECURONIUM BROMIDE 2 MG: 1 INJECTION, POWDER, LYOPHILIZED, FOR SOLUTION INTRAVENOUS at 14:30

## 2022-07-28 RX ADMIN — HEPARIN SODIUM 9000 UNITS/HR: 10000 INJECTION, SOLUTION INTRAVENOUS at 13:38

## 2022-07-28 RX ADMIN — GLYCOPYRROLATE 0.2 MG: 0.2 INJECTION, SOLUTION INTRAMUSCULAR; INTRAVENOUS at 12:25

## 2022-07-28 RX ADMIN — LABETALOL HYDROCHLORIDE 5 MG: 5 INJECTION INTRAVENOUS at 15:32

## 2022-07-28 RX ADMIN — DIPHENHYDRAMINE HYDROCHLORIDE 25 MG: 50 INJECTION, SOLUTION INTRAMUSCULAR; INTRAVENOUS at 14:58

## 2022-07-28 RX ADMIN — PROPOFOL 100 MG: 10 INJECTION, EMULSION INTRAVENOUS at 12:19

## 2022-07-28 RX ADMIN — SODIUM CHLORIDE 10 ML: 9 INJECTION INTRAMUSCULAR; INTRAVENOUS; SUBCUTANEOUS at 12:23

## 2022-07-28 RX ADMIN — HEPARIN SODIUM 4500 UNITS: 1000 INJECTION INTRAVENOUS; SUBCUTANEOUS at 13:18

## 2022-07-28 RX ADMIN — LIDOCAINE HYDROCHLORIDE 60 MG: 20 INJECTION, SOLUTION EPIDURAL; INFILTRATION; INTRACAUDAL; PERINEURAL at 12:19

## 2022-07-28 RX ADMIN — VECURONIUM BROMIDE 6 MG: 1 INJECTION, POWDER, LYOPHILIZED, FOR SOLUTION INTRAVENOUS at 13:15

## 2022-07-28 RX ADMIN — SUGAMMADEX 200 MG: 100 INJECTION, SOLUTION INTRAVENOUS at 14:52

## 2022-07-28 RX ADMIN — HEPARIN SODIUM 2000 UNITS: 1000 INJECTION INTRAVENOUS; SUBCUTANEOUS at 13:38

## 2022-07-28 RX ADMIN — VECURONIUM BROMIDE 10 MG: 1 INJECTION, POWDER, LYOPHILIZED, FOR SOLUTION INTRAVENOUS at 12:23

## 2022-07-28 RX ADMIN — SODIUM CHLORIDE: 9 INJECTION, SOLUTION INTRAVENOUS at 12:15

## 2022-07-28 RX ADMIN — PROTAMINE SULFATE 150 MG: 10 INJECTION, SOLUTION INTRAVENOUS at 14:52

## 2022-07-28 RX ADMIN — FUROSEMIDE 50 MG: 10 INJECTION, SOLUTION INTRAMUSCULAR; INTRAVENOUS at 14:52

## 2022-07-28 RX ADMIN — FENTANYL CITRATE 100 MCG: 50 INJECTION INTRAMUSCULAR; INTRAVENOUS at 12:16

## 2022-07-28 RX ADMIN — HEPARIN SODIUM 4500 UNITS: 1000 INJECTION INTRAVENOUS; SUBCUTANEOUS at 13:13

## 2022-07-28 RX ADMIN — METOPROLOL SUCCINATE 12.5 MG: 25 TABLET, EXTENDED RELEASE ORAL at 16:18

## 2022-07-28 RX ADMIN — SODIUM CHLORIDE: 9 INJECTION, SOLUTION INTRAVENOUS at 12:25

## 2022-07-28 RX ADMIN — ONDANSETRON 4 MG: 2 INJECTION INTRAMUSCULAR; INTRAVENOUS at 12:25

## 2022-07-28 RX ADMIN — DOBUTAMINE HYDROCHLORIDE 10 MCG/KG/MIN: 200 INJECTION INTRAVENOUS at 14:44

## 2022-07-28 RX ADMIN — DEXAMETHASONE SODIUM PHOSPHATE 8 MG: 4 INJECTION, SOLUTION INTRAMUSCULAR; INTRAVENOUS at 12:25

## 2022-07-28 ASSESSMENT — ENCOUNTER SYMPTOMS: SHORTNESS OF BREATH: 0

## 2022-07-28 NOTE — PROCEDURES
[address]     Electrophysiology Procedure Note       Date of Procedure: 7/28/2022  Patient's Name: Gwen Prabhakar  YOB: 1960   Medical Record Number: [id number]  Referring Physician: Josh Marie MD  Procedure Performed by:  Antolin Acosta MD    Procedure performed:  Electrophysiology study with radiofrequency ablation of atrial fibrillation and pulmonary vein isolation   Ablation of roof-dependent left atrial flutter with  ms with straight up-down activation  Ablation of anterior wall dependent left atrial flutter with  ms with proximal-distal activation  Ablation of another anterior wall dependent left atrial flutter with  ms with proximal-distal activation  Additional ablation with creation of a roof line (for roof-dependent left atrial flutter), anterior line from mitral annulus to the roof (for anterior wall dependent left atrial flutter), and another anterior line (for another anterior wall dependent left atrial flutter)  Additional ablation of complex fractionated atrial electrograms (for atrial fibrillation) on the LA septal wall  Additional ablation of complex fractionated atrial electrograms (for atrial fibrillation) on the LA anterior wall near SHERI   3-D electroanatomical mapping of the left atrium    Transseptal puncture through an intact septum x 2 under intracardiac ultrasound guidance without fluoroscopic guidance   Intracardiac echocardiography  Left ventricular pacing and recording  Drug infusion with an attempt to induce atrial tachydysrhythmia  Transesophageal echocardiogram  Anesthesia: General anesthesia provided by the Anesthesia service    Indications for procedure:    Gwen Prabhakar is a 65 y.o. male who has a history of persistent atrial fibrillation s/p afib ablation in 1/2022 with recurrence of atrial fibrillation and left atrial flutter who is symptomatic with symptoms of dyspnea with minimal exertion and fatigue who has failed antiarrhythmic therapy in the past is now here for an ablation for persistent atrial fibrillation and left atrial flutter. Details of Procedure: The risks, benefits and alternatives of the ablation procedure were discussed with the patient. The risks including, but not limited to, the risks of bleeding, infection, radiation exposure, injury to vascular, cardiac and surrounding structures (including pneumothorax), stroke, cardiac perforation, tamponade, need for emergent open heart surgery, need for pacemaker implantation, esophageal injury and fistula, myocardial infarction and death were discussed in detail. The patient was also counseled at length about the risks of tan Covid-19 in the ghislaine-operative and post-operative states including the recovery window of their procedure. The patient was made aware that tan Covid-19 after a surgical procedure may worsen their prognosis for recovering from the virus and lend to a higher morbidity and or mortality risk. The patient was given the option of postponing their procedure. The patient was also presented reasonable alternatives to the proposed care, treatment, and services. The discussion I have had with the patient encompassed risks, benefits, and side effects related to the alternatives and the risks related to not receiving the proposed care, treatment and services. The patient opted to proceed with the ablation. Written informed consent was signed and placed in the chart. Patient was brought to the EP lab in a fasting non-sedate state. Patient underwent general anesthesia by anesthesia team. The patient was monitored continuously with ECG, pulse oximetry, blood pressure monitoring, and direct observation. No urinary lucero was placed in order to prevent any hematuria or urinary tract infection. We initially performed a transesophageal echo that showed no left atrial appendage clot/thrombus. Both groins were prepped in a sterile fashion.  We gained access in the right femoral vein. One 8 Macanese short sheath for ICE and subsequently for CS catheters were placed in the right femoral vein using modified seldinger technique. Two 8.5F SLO sheaths were introduced into the right femoral vein using modified Seldinger technique. Then a CS cathter was placed inside the coronary sinus without fluoroscopy but with 3-D electroanatomic mapping for recording and mapping of the left atrium. Using ICE we delineated the left pulmonary vein, left atrial appendage,  mitral valve, and right superior and right inferior pulmonary veins, and the trivial amount of pericardial effusion prior to ablation. Two transeptal punctures were performed under intracardiac echocardiogram, pressure monitoring, and without fluoroscopic guidance. Patient received a bolus of heparin shortly after each transseptal puncture followed by continuous monitoring of the ACT every 15-30 minutes, and additional boluses of heparin during the procedure to keep the ACT between 300-400 sec. We placed both SLO sheaths inside the left atrium. Also an esophageal temperature probe ([employer] X[employer] Temperature Probe 12Fr) that was tied with sutures to an accompanying [employer] quadripolar 6 Macanese 5-5-5 electrode spacing catheter was advanced into the esophagus for real-time mapping of the esophagus and careful monitoring of the esophageal temperature during ablation. Using the Penta ray cathter and Carto navigation system a three dimensional electro anatomical mapping of the left atrium, in addition to right and left sided pulmonary vein anatomy was created. During sinus rhythm, we found that the left superior and right superior pulmonary veins still had PV fascicle. The antrums of the LSPV, LIPV, RSPV and R tiny posteriorly were not electrically isolated. With rapid atrial pacing at 220 ms, we were able to easily induce atrial fibrillation.  The atrial fibrillation vacillated with two different types of left pulmonary vein fascicles and antral ablations. We found 3 site(s) on the septal aspect of the LA and ablated these foci. We then evaluated the anterior wall of the LA wall near the SHERI for complex fractionated atrial electrogram, a seprate mechanism that would initiate and/or perpetuate atrial fibrillation apart from the pulmonary vein fascicles and antral ablations. We found 1 site(s) on the anterior wall of the LA near the SHERI and ablated these foci. After ablation was complete, catheters were placed in the left and right atrium, His-position, right ventricle, and left ventricle for pacing and recording. Arrhythmia was attempted to be induced by rapid atrial and ventricular pacing, and there was no induction of atrial tachydysrhythmia. Maximum output (20mA) pacing in the L antrum and R antrum were also performed and showed dissociation from the rest of the left atrium. This was checked circumferentially around the antrums and verified many times. We evaluated the roof line and found that there was complete, bidirectional block. The anterior wall ablated line was evaluated and showed complete bidirectional block, as evidenced by a longer transit time on the medial aspect of the ablated line when compared to the lateral aspect of the ablated line when pacing from the LA appendage. Adenosine bolus of 18mg was also given for each of the 3 pulmonary veins (that originally had PV fascicles or had their antrums not electrically isolated) to assess for acute re-connection and to attempt to induce atrial fibrillation. We had adequate adenosine effect (AV blocks of > 3 seconds) and there were no pulmonary fascicles seen in any of the veins nor were there any atrial tachydysrhythmia induced.  We then administered dobutamine infusion up to 10 mcg/kg/min in order to achieve at least a 20% increase in heart rate from the basal heart rate to induce any atrial tachydysrhythmia, and there were no atrial tachydysrhythmia induced. We then performed an EP study and programmed stimulation using our CS catheter and ablation catheter to assess the cardiac conduction system and to attempt to induce atrial tachydysrhythmia. The ablation catheter were moved from the left atrium to the left ventricular apex and His bundle position. His bundle potentials was recorded and pacing and recordings were performed from right atrium, coronary sinus and LV apex with the following results:     Sinus cycle length was 716 msec  OH interval was 148 msec  QRS duration was 84 msec  QT interval was 395 msec  AH interval was 76 msec  HV interval was 39 msec  Pacing from left atrium, 1:1 conduction over AV node with (AV wenckebach) was 280  msec  Pacing from left atrium, AV chiquis ERP was 600/210 msec   Pacing from LV apex, 1:1 retrograde conduction over AV node (VA wenckebach) was 400 msec  Pacing from LV apex, showed VAERP of 500/380 msec. Then both the ablation, Pentarray, and CS catheters were removed from the body, and all 3 sheaths were removed from the right femoral vein with a figure-of-eight suture that was placed to provide hemostasis while awaiting the downtrending of the ACT. Protamine 150mg IV x 1 was administered to partially reverse the IV heparin that was administered during the atrial fibrillation ablation procedure. Under intracardiac ultrasound guidance, we evaluated for pericardial effusion, and there was no evidence of such. Specimen collected: none    Estimated blood loss: < 50 cc    The patient tolerated the procedure well and there were no complications. Patient was extubated and transferred to the recovery area in stable condition.      Conclusion:     - Pre- and post-procedure diagnoses were persistent atrial fibrillation, roof-dependent left atrial flutter with  ms with straight up-down activation, anterior wall dependent left atrial flutter with  ms with proximal-distal activation, and another anterior wall dependent left atrial flutter with  ms with proximal-distal activation  - Pulmonary vein isolations using wide area circumferential radiofrequency ablation   - Additional ablation with creation of roof line (for roof-dependent left atrial flutter), anterior line from the mitral annulus to the roof (for anterior wall dependent left atrial flutter), and another anterior line from roof to the previously ablated anterior line (for anterior wall dependent left atrial flutter)  - Ablation of complex fractionated atrial electrogram in the left atrial septal wall (for atrial fibrillation)  - Ablation of complex fractionated atrial electrogram in the left atrial anterior near SHERI (for atrial fibrillation)    Plan:   The patient will be monitored and receive the usual post ablation care. If there are no complications, the patient will be discharged from the hospital either later today or tomorrow with pre-admission Flecainide 50mg po BID, a new prescription for Toprol XL 12.5mg daily, and pre-admission Xarelto 20mg daily. The patient will follow-up with the EP Nurse Practitioner in 3 month's time. Thank you for allowing us to participate in the care of your patient. If you have any questions or concerns, please do not hesitate to contact me.     Shawn Beck MD, MS, [facility]  Cardiac Electrophysiology  [address]  [address] [facility], [facility] [address]  [phone]

## 2022-07-28 NOTE — ANESTHESIA POSTPROCEDURE EVALUATION
[facility] Department of Anesthesiology  Post-Anesthesia Note       Name:  Rigoberto Turner                                  Age:  65 y.o. MRN:  [id number]     Last Vitals & Oxygen Saturation: BP (!) 153/99   Pulse (!) 101   Temp 97.3 °F (36.3 °C) (Infrared)   Resp 18   Ht 5' 11\" (1.803 m)   Wt 212 lb (96.2 kg)   SpO2 95%   BMI 29.57 kg/m²   Patient Vitals for the past 4 hrs:   BP Temp Temp src Pulse Resp SpO2   07/28/22 1602 (!) 153/99 -- -- (!) 101 -- 95 %   07/28/22 1556 (!) 156/108 -- -- (!) 105 -- --   07/28/22 1550 (!) 148/104 -- -- 99 -- 96 %   07/28/22 1545 (!) 159/109 -- -- 97 -- --   07/28/22 1536 (!) 158/111 -- -- (!) 103 -- 96 %   07/28/22 1533 (!) 152/102 -- -- 100 -- 98 %   07/28/22 1517 (!) 159/111 -- -- (!) 102 -- 100 %   07/28/22 1508 (!) 175/109 97.3 °F (36.3 °C) Infrared (!) 121 18 98 %       Level of consciousness:  Awake, alert to baseline    Respiratory: Respirations easy, no distress. Stable. Cardiovascular: Hemodynamically stable. Hydration: Adequate. PONV: Adequately managed. Post-op pain: Adequately controlled. Post-op assessment: Tolerated anesthetic well without complication. Complications:  None.     Ximena Yang MD  July 28, 2022   4:08 PM

## 2022-07-28 NOTE — H&P
H&P Update    I have reviewed the history and physical from PRESTON Valero NP, (see below) and examined the patient and find no relevant changes. I have reviewed with the patient and/or family the risks, benefits, and alternatives to the procedure. Pre-sedation Assessment    Patient:  Bard George   :   1960  Intended Procedure: MARTY/Afib ablation       Later nurses notes reviewed and agreed. Medications reviewed  Allergies: Allergies   Allergen Reactions    Amoxicillin Other (See Comments)     Groin area extremely red-\"on fire\"-skin peels off from neck to feet-over 2 weeks    Pcn [Penicillins] Other (See Comments)     Groin area extremely red and skin peels off from neck to feet over 2 weeks         Pre-Procedure Assessment/Plan:  ASA 2 - Patient with mild systemic disease with no functional limitations    Level of Sedation Plan: General Anesthesia    Post Procedure plan: Return to same level of care    --------------------------------------------      [address]   Electrophysiology        Date: 2022     Primary Cardiologist: David Lee MD  PCP: Karen Felix MD      Chief Complaint:        Chief Complaint   Patient presents with    Follow-up       2mnt no cc      History of Present Illness:     I saw Bard George in the office for electrophysiology follow up today. He is a 65 y.o. male with a past medical history of atrial fibrillation first noted in 2021 when also diagnosed with Covid. He underwent successful cardioversion on 21 with Dr. Nayely Kay and has plans for an upcoming atrial fibrillation ablation. He was back in atrial fibrillation when seen in the office on 21 and his flecainide was increased to 100mg BID. He underwent successful cardioversion on 21 but was back in atrial fibrillation about 5 minutes after.  He ultimately underwent atrial fibrillation ablation (PVI, CAFE) and left atrial flutter (roof line, anterior wall) ablation on 22 with Dr. Carla Braswell. Flecainide was stopped once he was 3 months out from his ablation and a 30 day monitor was ordered. Monitor showed predominately sinus but with multiple episodes of atrial fibrillation, longest being 1 hour and 19 minutes. He presents today for monitor results. He has been feeling well since his last visit. Denies any palpitations or dyspnea. No chest pain. No syncope or edema. No bleeding issues. Allergies: Allergies   Allergen Reactions    Amoxicillin Other (See Comments)       Groin area extremely red-\"on fire\"-skin peels off from neck to feet-over 2 weeks    Pcn [Penicillins] Other (See Comments)       Groin area extremely red and skin peels off from neck to feet over 2 weeks      Home Medications:        Prior to Visit Medications   Medication Sig Taking?  Authorizing Provider   flecainide (TAMBOCOR) 50 MG tablet Take 1 tablet by mouth 2 times daily Yes BALAJI Cheung - CNP   potassium chloride (KLOR-CON M) 20 MEQ extended release tablet TAKE 2 TABLETS BY MOUTH DAILY AS NEEDED AS DIRECTED Yes Claudetta Mam, MD   MAGNESIUM-OXIDE 400 (240 Mg) MG tablet TAKE 1/2 TABLET BY MOUTH DAILY Yes Claudetta Mam, MD   spironolactone (ALDACTONE) 25 MG tablet Take 1 tablet by mouth daily Yes BALAJI Cheung CNP   rivaroxaban (XARELTO) 20 MG TABS tablet Take 1 tablet by mouth daily (with breakfast) Yes Roman Navarro MD   tamsulosin (FLOMAX) 0.4 MG capsule Take 1 capsule by mouth daily   Mayo Hearn PA-C         Past Medical History:  Past Medical History        Past Medical History:   Diagnosis Date    A-fib [facility])      Broken teeth      COVID-19      Hypertension      Kidney stones              Past Surgical History:   Past Surgical History         Past Surgical History:   Procedure Laterality Date    CARDIOVERSION         x2    CYSTOSCOPY Left 1/21/2022     CYSTOSCOPY, LEFT URETEROSCOPY, HOLMIUM LASER, STONE MANIPULATION performed by Paul Katz MD at [address] Left 1/21/2022     LEFT STENT PLACEMENT performed by Christiana Suresh MD at [address] [place] [address]         as a child            Social History:   reports that he has never smoked. He has never used smokeless tobacco. He reports current alcohol use. He reports that he does not use drugs. Family History:  Family History             Problem Relation Age of Onset    Diabetes Mother      High Blood Pressure Mother      Heart Disease Brother              Review of Systems  Constitutional: Negative for chills, fatigue, fever and unexpected weight change. HENT: Negative for congestion, hearing loss, sinus pressure, sore throat and trouble swallowing. Respiratory: Negative for cough, shortness of breath and wheezing. Cardiovascular: Negative for chest pain, palpitations and leg swelling. Gastrointestinal: Negative for abdominal pain, blood in stool, constipation, diarrhea, nausea and vomiting. Genitourinary: Negative for hematuria. Musculoskeletal: Negative for arthralgias, back pain, gait problem and myalgias. Skin: Negative for color change, rash and wound. Neurological: Negative for dizziness, seizures, syncope, speech difficulty, weakness and light-headedness. Hematological: Does not bruise/bleed easily. Physical Examination:      Vitals:     06/28/22 1530   BP: 130/82   Pulse: 81   SpO2: 97%          Wt Readings from Last 3 Encounters:   06/28/22 210 lb 9.6 oz (95.5 kg)   04/28/22 210 lb (95.3 kg)   03/14/22 213 lb (96.6 kg)         Physical Exam  Vitals reviewed. Constitutional:       General: He is not in acute distress. Appearance: Normal appearance. HENT:     Head: Normocephalic and atraumatic. Nose: Nose normal.      Mouth/Throat:      Mouth: Mucous membranes are moist.   Eyes:     Conjunctiva/sclera: Conjunctivae normal.      Pupils: Pupils are equal, round, and reactive to light. Cardiovascular:     Rate and Rhythm: Normal rate and regular rhythm.       Heart sounds: No murmur heard. No friction rub. No gallop. Pulmonary:     Effort: No respiratory distress. Breath sounds: No wheezing, rhonchi or rales. Abdominal:      General: Abdomen is flat. Bowel sounds are normal.      Palpations: Abdomen is soft. Musculoskeletal:         General: Normal range of motion. Right lower leg: No edema. Left lower leg: No edema. Skin:     General: Skin is warm and dry. Findings: No bruising. Neurological:     General: No focal deficit present. Mental Status: He is alert and oriented to person, place, and time. Motor: No weakness. Psychiatric:         Mood and Affect: Mood normal.         Behavior: Behavior normal.           Pertinent labs, diagnostic, device, and imaging results reviewed as a part of this visit     LABS     CBC:         Lab Results   Component Value Date     WBC 11.5 (H) 2022     HGB 11.3 (L) 2022     HCT 34.8 (L) 2022     MCV 84.3 2022      2022      BMP:         Lab Results   Component Value Date     CREATININE 1.2 2022     BUN 11 2022      2022     K 3.7 2022      2022     CO2 28 2022      Estimated Creatinine Clearance: 76 mL/min (based on SCr of 1.2 mg/dL). No results found for: BNP     Thyroid:         Lab Results   Component Value Date     TSH 2.99 2021      Lipid Panel:         Lab Results   Component Value Date     CHOL 119 2020     HDL 32 2020     TRIG 68 2020      LFTs:        Lab Results   Component Value Date     ALT 23 2022     AST 24 2022     ALKPHOS 77 2022     BILITOT 0.7 2022      Coags:         Lab Results   Component Value Date     PROTIME 11.5 2015     INR 1.06 2015     APTT 25.4 2015         EC2022   SR at 82 BPM. Ventricular bigeminy. EP Procedures:  1. Successful DCCV for persistent atrial fibrillation, 21, Dr. Lisa Ramos  2.  Successful DCCV for persistent atrial fibrillation, 12/30/21, Dr. Ariadna Hawkins  3. Atrial fibrillation ablation (PVI, CAFE) and left atrial flutter (roof line, anterior wall) ablation on 1/27/22, Dr. Dev Light:     Persistent atrial fibrillation              - first seen on EKG on 11/13/21              - s/p successful DCCV on 12/14/21              - s/p successful DCCV on 12/30/21 but A fib reoccurred within 5 minutes              - s/p A fib (PVI, CAFE) ablation on 1/27/22 with Dr. Miranda Soliz 1 (HTN) on Xarelto 20mg QD - discussed ACC guideline with his CHADS2-VASc of 1 and would like to stop Xarelto once out of current prescription               - EKG today with SR              - 30 day monitor showed multiple episodes of sustained atrial fibrillation (3% burden) with longest being 1 hour and 19 minutes              - discussed options including medical therapy or repeat ablation, at this time he would like to repeat ablation, will have to check with financial department as he does not have insurance, resume flecainide 50mg BID as well     Left atrial flutter              - seen on EP study s/p roof line, anterior wall ablation on 1/27/22              - see above     Essential HTN              - BP controlled     Thank you for allowing to us to participate in the care of [facility]. Follow up after ablation.      BALAJI Bass  [facility] [address], [address] [facility]  Phone: [phone]  Fax:     [phone]

## 2022-07-28 NOTE — DISCHARGE INSTRUCTIONS
CARDIAC ABLATION    Care of your puncture site:  Remove bandage 24 hours after the procedure. May shower in 24 hours but do not sit in a bathtub/pool of water for 5 days or until the wound is healed. Gently clean groin using soap and water. Dry thoroughly and apply a Band-Aid that covers the entire site. Use Band-Aid until skin heals over in about 3-5 days. Do not apply powder or lotion. Limit walking and stair climbing today. Normal Observations:  Soreness or tenderness which may last one week. Mild oozing from the incision site. Possible bruising that could last 2 weeks. (Atrial Fib Ablations Only)  You may experience chest burning that will peak in 2 days of the procedure. The burning will begin to subside the following days. You may take Tylenol for the discomfort    Activity:  You may resume driving 24 hours following the procedure. Do not make important / legal decisions within 24 hours after procedure. Do not drink alcoholic beverages or take any sleeping pills for 24 hours. You may resume normal activity in 3 days or after the wound heals. Avoid lifting more than 10 pounds for 3 days or until the wound heals. Avoid strenuous exercise or activity for 1 week. You may return to work in {NUMBER 1-10:[id number]} {Time; ALC/LK/BU/LO(V):3895}, if applicable. Nutrition:  Regular diet. Call your doctor immediately if your condition worsens, for any other concerns, for a follow-up appointment or if you experience any of the following:  Increased swelling on the groin or leg. Unusual pain, numbness, or tingling of the groin or down the leg. Any signs of infection such as: redness, yellow drainage at the site, swelling or pain.     IF GROIN STARTS BLEEDING SIGNIFICANTLY:   LAY FLAT, HOLD FIRM DIRECT PRESSURE, AND CALL 911

## 2022-07-28 NOTE — ANESTHESIA PRE PROCEDURE
Department of Anesthesiology  Preprocedure Note       Name:  Mary Jane Kay   Age:  65 y.o.  :  1960                                          MRN:  [id number]         Date:  2022      Surgeon: * No surgeons listed *    Procedure: * No procedures listed *    Medications prior to admission:   Prior to Admission medications    Medication Sig Start Date End Date Taking?  Authorizing Provider   potassium chloride (KLOR-CON M) 20 MEQ extended release tablet Take 1 tablet by mouth daily 22   Jj Carlton MD   flecainide [facility]) 50 MG tablet Take 1 tablet by mouth 2 times daily 22   Jason Lopezchild, APRN - CNP   MAGNESIUM-OXIDE 400 (240 Mg) MG tablet TAKE 1/2 TABLET BY MOUTH DAILY 22   Jj Carlton MD   spironolactone (ALDACTONE) 25 MG tablet Take 1 tablet by mouth daily 22   [facility], APRN - CNP   rivaroxaban (XARELTO) 20 MG TABS tablet Take 1 tablet by mouth daily (with breakfast) 22   Rajiv Tilley MD   tamsulosin [facility]) 0.4 MG capsule Take 1 capsule by mouth daily 22  Dale Flores PA-C       Current medications:    Current Outpatient Medications   Medication Sig Dispense Refill    potassium chloride (KLOR-CON M) 20 MEQ extended release tablet Take 1 tablet by mouth daily 60 tablet 3    flecainide (TAMBOCOR) 50 MG tablet Take 1 tablet by mouth 2 times daily 60 tablet 3    MAGNESIUM-OXIDE 400 (240 Mg) MG tablet TAKE 1/2 TABLET BY MOUTH DAILY 15 tablet 2    spironolactone (ALDACTONE) 25 MG tablet Take 1 tablet by mouth daily 90 tablet 3    rivaroxaban (XARELTO) 20 MG TABS tablet Take 1 tablet by mouth daily (with breakfast) 30 tablet 5    tamsulosin (FLOMAX) 0.4 MG capsule Take 1 capsule by mouth daily 30 capsule 0     Current Facility-Administered Medications   Medication Dose Route Frequency Provider Last Rate Last Admin    sodium chloride flush 0.9 % injection 5-40 mL  5-40 mL IntraVENous 2 times per day Rajiv Tilley MD        sodium chloride flush 0.9 % injection 5-40 mL  5-40 mL IntraVENous PRN Ross Gu MD        0.9 % sodium chloride infusion   IntraVENous PRN Ross Gu MD           Allergies:     Allergies   Allergen Reactions    Amoxicillin Other (See Comments)     Groin area extremely red-\"on fire\"-skin peels off from neck to feet-over 2 weeks    Pcn [Penicillins] Other (See Comments)     Groin area extremely red and skin peels off from neck to feet over 2 weeks       Problem List:    Patient Active Problem List   Diagnosis Code    Hypokalemia E87.6    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    Atrial fibrillation with rapid ventricular response ([address] I48.91    Pneumonia due to COVID-19 virus U07.1, J12.82    Essential hypertension I10    Acute respiratory failure with hypoxia (HCC) J96.01    Persistent atrial fibrillation ([address] I48.19    Left atrial flutter by electrocardiogram ([address] I48.92       Past Medical History:        Diagnosis Date    A-fib ([address]     Broken teeth     COVID-19     Hypertension     Kidney stones        Past Surgical History:        Procedure Laterality Date    CARDIOVERSION      x2    CYSTOSCOPY Left 1/21/2022    CYSTOSCOPY, LEFT URETEROSCOPY, HOLMIUM LASER, STONE MANIPULATION performed by Michelet Card MD at [address] [place] [address]. Left 1/21/2022    LEFT STENT PLACEMENT performed by Michelet Card MD at [address] [place] [address]      as a child       Social History:    Social History     Tobacco Use    Smoking status: Never    Smokeless tobacco: Never   Substance Use Topics    Alcohol use: Yes     Comment: seldom                                Counseling given: Not Answered      Vital Signs (Current):   Vitals:    07/28/22 1033   BP: (!) 150/97   Pulse: 94   Resp: 20   Temp: 98.6 °F (37 °C)   TempSrc: Infrared   SpO2: 100%   Weight: 212 lb (96.2 kg)   Height: 5' 11\" (1.803 m)                                              BP Readings from Last 3 Encounters:   07/28/22 (!) 150/97   07/14/22 138/84   06/28/22 130/82       NPO Status:                                                                                 BMI:   Wt Readings from Last 3 Encounters:   07/28/22 212 lb (96.2 kg)   07/14/22 212 lb (96.2 kg)   06/28/22 210 lb 9.6 oz (95.5 kg)     Body mass index is 29.57 kg/m². CBC:   Lab Results   Component Value Date/Time    WBC 5.9 07/26/2022 03:39 PM    RBC 5.03 07/26/2022 03:39 PM    HGB 13.2 07/26/2022 03:39 PM    HCT 38.8 07/26/2022 03:39 PM    MCV 77.3 07/26/2022 03:39 PM    RDW 17.6 07/26/2022 03:39 PM     07/26/2022 03:39 PM       CMP:   Lab Results   Component Value Date/Time     07/26/2022 03:39 PM    K 4.7 07/26/2022 03:39 PM    K 2.8 01/21/2022 10:53 AM    K 2.7 01/21/2022 10:53 AM     07/26/2022 03:39 PM    CO2 25 07/26/2022 03:39 PM    BUN 13 07/26/2022 03:39 PM    CREATININE 1.1 07/26/2022 03:39 PM    GFRAA >60 07/26/2022 03:39 PM    AGRATIO 1.1 01/21/2022 10:53 AM    LABGLOM >60 07/26/2022 03:39 PM    GLUCOSE 92 07/26/2022 03:39 PM    PROT 6.4 01/21/2022 10:53 AM    CALCIUM 9.0 07/26/2022 03:39 PM    BILITOT 0.7 01/21/2022 10:53 AM    ALKPHOS 77 01/21/2022 10:53 AM    AST 24 01/21/2022 10:53 AM    ALT 23 01/21/2022 10:53 AM       POC Tests: No results for input(s): POCGLU, POCNA, POCK, POCCL, POCBUN, POCHEMO, POCHCT in the last 72 hours.     Coags:   Lab Results   Component Value Date/Time    PROTIME 11.5 08/09/2015 07:05 PM    INR 1.06 08/09/2015 07:05 PM    APTT 25.4 08/09/2015 07:05 PM       HCG (If Applicable): No results found for: PREGTESTUR, PREGSERUM, HCG, HCGQUANT     ABGs: No results found for: PHART, PO2ART, TRX6AHA, UDH1FAN, BEART, I5OHYVIU     Type & Screen (If Applicable):  No results found for: LABABO, LABRH    Drug/Infectious Status (If Applicable):  No results found for: HIV, HEPCAB    COVID-19 Screening (If Applicable):   Lab Results   Component Value Date/Time    COVID19 Not Detected 01/27/2022 11:05 AM           Anesthesia Evaluation  Patient summary reviewed and Nursing notes reviewed no history of anesthetic complications:   Airway: Mallampati: II     Neck ROM: full  Mouth opening: > = 3 FB   Dental: normal exam         Pulmonary:       (-) pneumonia, asthma, shortness of breath, recent URI and sleep apnea                          ROS comment: Covid infection during Thanksgiving, weaned off O2   Cardiovascular:  Exercise tolerance: good (>4 METS),   (+) hypertension:, dysrhythmias (on fleicanide): atrial fibrillation,     (-) valvular problems/murmurs, past MI, CAD, CABG/stent,  angina,  DAVEY and no pulmonary hypertension      Rhythm: irregular                   ROS comment: TTE 2/2022:   Conclusions      Summary   Left ventricular cavity size is normal.   Normal left ventricular wall thickness. Ejection fraction is visually estimated to be 50-55%. Regional wall motion abnormalities are noted. Indeterminate diastolic function. Normal right ventricular size and function. no valvular stenosis     Neuro/Psych:   Negative Neuro/Psych ROS     (-) seizures, TIA and CVA           GI/Hepatic/Renal:   (+) renal disease: kidney stones,      (-) liver disease       Endo/Other:    (+) blood dyscrasia: anticoagulation therapy:., .    (-) diabetes mellitus, hypothyroidism, hyperthyroidism               Abdominal:             Vascular:     - PVD, DVT and PE. Other Findings:             Anesthesia Plan      general     ASA 3     (64 yo with PMHx of afib presenting for afib ablation.     I discussed with the patient the risks and benefits to general anesthesia including possible anesthetic complications but not limited to: PONV, damage to the airway and surrounding structures (teeth, lips, gums, tongue, etc.), adverse reactions to medications, cardiac complications (MI, CHF, arrhythmias, etc.), respiratory complications (post-op ventilation, respiratory failure, etc.), neurologic complications (nerve damage, stroke, seizure), the potential for conversion to a general anesthetic, and death. The patient was given the opportunity to ask questions and all questions were answered to the patient's satisfaction.  )  Induction: intravenous. MIPS: Postoperative opioids intended and Prophylactic antiemetics administered. Anesthetic plan and risks discussed with patient. Plan discussed with CRNA. This pre-anesthesia assessment may be used as a history and physical.    DOS STAFF ADDENDUM:    Pt seen and examined, chart reviewed (including anesthesia, drug and allergy history). No interval changes to history and physical examination. Anesthetic plan, risks, benefits, alternatives, and personnel involved discussed with patient. Patient verbalized an understanding and agrees to proceed.       Kelly Diego MD  July 28, 2022  11:31 AM

## 2022-07-29 DIAGNOSIS — D64.9 ANEMIA, UNSPECIFIED TYPE: Primary | ICD-10-CM

## 2022-07-29 LAB
EKG ATRIAL RATE: 103 BPM
EKG DIAGNOSIS: NORMAL
EKG P AXIS: 63 DEGREES
EKG P-R INTERVAL: 158 MS
EKG Q-T INTERVAL: 366 MS
EKG QRS DURATION: 84 MS
EKG QTC CALCULATION (BAZETT): 479 MS
EKG R AXIS: 20 DEGREES
EKG T AXIS: 44 DEGREES
EKG VENTRICULAR RATE: 103 BPM

## 2022-07-29 PROCEDURE — 93010 ELECTROCARDIOGRAM REPORT: CPT | Performed by: INTERNAL MEDICINE

## 2022-09-01 RX ORDER — LANOLIN ALCOHOL/MO/W.PET/CERES
CREAM (GRAM) TOPICAL
Qty: 15 TABLET | Refills: 2 | Status: SHIPPED | OUTPATIENT
Start: 2022-09-01 | End: 2022-11-04

## 2022-09-01 NOTE — TELEPHONE ENCOUNTER
Last OV:2022  Last Labs:2022  Last Refills:2022  Next Appt:10/31/2022  Last EK2022      (XARELTO) 20 MG TABS

## 2022-09-30 ENCOUNTER — TELEPHONE (OUTPATIENT)
Dept: CARDIOLOGY CLINIC | Age: 62
End: 2022-09-30

## 2022-09-30 NOTE — TELEPHONE ENCOUNTER
Last OV: 06/28/2022  Next OV: 10/31/2022        Please see other telephone encounter.  Pt also needs refill

## 2022-09-30 NOTE — TELEPHONE ENCOUNTER
Medication Refill    Medication needing refilled: rivaroxaban (XARELTO)     Dosage of the medication: 20 MG TABS      How are you taking this medication (QD, BID, TID, QID, PRN): Take 1 tablet by mouth daily (with breakfast)    30 or 90 day supply called in: 90    When will you run out of your medication: now     Which Pharmacy are we sending the medication to?: [address], [facility] [address]   [facility] [address]   Phone:  [phone]  Fax:  [phone]

## 2022-09-30 NOTE — TELEPHONE ENCOUNTER
Medication Samples    Medication: rivaroxaban (XARELTO)      Dosage of the medication:20 MG TABS tablet      How are you taking this medication (QD, BID, TID, QID, PRN): Take 1 tablet by mouth daily (with breakfast)     in the office or Mail to your home?      WILL

## 2022-10-03 RX ORDER — RIVAROXABAN 20 MG/1
TABLET, FILM COATED ORAL
Qty: 90 TABLET | Refills: 3 | Status: SHIPPED | OUTPATIENT
Start: 2022-10-03

## 2022-10-04 RX ORDER — FLECAINIDE ACETATE 50 MG/1
50 TABLET ORAL 2 TIMES DAILY
Qty: 60 TABLET | Refills: 3 | Status: SHIPPED | OUTPATIENT
Start: 2022-10-04 | End: 2022-10-31 | Stop reason: ALTCHOICE

## 2022-10-06 ENCOUNTER — HOSPITAL ENCOUNTER (EMERGENCY)
Age: 62
Discharge: HOME | End: 2022-10-06
Attending: EMERGENCY MEDICINE

## 2022-10-06 VITALS
HEART RATE: 86 BPM | SYSTOLIC BLOOD PRESSURE: 158 MMHG | RESPIRATION RATE: 18 BRPM | HEIGHT: 71 IN | TEMPERATURE: 97.3 F | OXYGEN SATURATION: 97 % | WEIGHT: 201.94 LBS | DIASTOLIC BLOOD PRESSURE: 96 MMHG | BODY MASS INDEX: 28.27 KG/M2

## 2022-10-06 DIAGNOSIS — I10 PRIMARY HYPERTENSION: Primary | ICD-10-CM

## 2022-10-06 LAB
ANION GAP SERPL CALCULATED.3IONS-SCNC: 10 MMOL/L (ref 3–16)
ANISOCYTOSIS: ABNORMAL
BASOPHILS ABSOLUTE: 0.1 K/UL (ref 0–0.2)
BASOPHILS RELATIVE PERCENT: 1.1 %
BUN BLDV-MCNC: 12 MG/DL (ref 7–20)
CALCIUM SERPL-MCNC: 8.9 MG/DL (ref 8.3–10.6)
CHLORIDE BLD-SCNC: 108 MMOL/L (ref 99–110)
CO2: 25 MMOL/L (ref 21–32)
CREAT SERPL-MCNC: 1.1 MG/DL (ref 0.8–1.3)
EKG ATRIAL RATE: 76 BPM
EKG DIAGNOSIS: NORMAL
EKG P AXIS: 57 DEGREES
EKG P-R INTERVAL: 150 MS
EKG Q-T INTERVAL: 386 MS
EKG QRS DURATION: 88 MS
EKG QTC CALCULATION (BAZETT): 434 MS
EKG R AXIS: 19 DEGREES
EKG T AXIS: 23 DEGREES
EKG VENTRICULAR RATE: 76 BPM
EOSINOPHILS ABSOLUTE: 0.3 K/UL (ref 0–0.6)
EOSINOPHILS RELATIVE PERCENT: 4.2 %
GFR AFRICAN AMERICAN: >60
GFR NON-AFRICAN AMERICAN: >60
GLUCOSE BLD-MCNC: 94 MG/DL (ref 70–99)
HCT VFR BLD CALC: 42.3 % (ref 40.5–52.5)
HEMOGLOBIN: 14.3 G/DL (ref 13.5–17.5)
LYMPHOCYTES ABSOLUTE: 1.8 K/UL (ref 1–5.1)
LYMPHOCYTES RELATIVE PERCENT: 26.3 %
MAGNESIUM: 2.1 MG/DL (ref 1.8–2.4)
MCH RBC QN AUTO: 27.4 PG (ref 26–34)
MCHC RBC AUTO-ENTMCNC: 33.8 G/DL (ref 31–36)
MCV RBC AUTO: 81.1 FL (ref 80–100)
MONOCYTES ABSOLUTE: 0.9 K/UL (ref 0–1.3)
MONOCYTES RELATIVE PERCENT: 13.1 %
NEUTROPHILS ABSOLUTE: 3.7 K/UL (ref 1.7–7.7)
NEUTROPHILS RELATIVE PERCENT: 55.3 %
OVALOCYTES: ABNORMAL
PDW BLD-RTO: 15.2 % (ref 12.4–15.4)
PLATELET # BLD: 204 K/UL (ref 135–450)
PLATELET SLIDE REVIEW: ADEQUATE
PMV BLD AUTO: 9.3 FL (ref 5–10.5)
POIKILOCYTES: ABNORMAL
POTASSIUM REFLEX MAGNESIUM: 3.4 MMOL/L (ref 3.5–5.1)
RBC # BLD: 5.22 M/UL (ref 4.2–5.9)
SARS-COV-2, NAAT: NOT DETECTED
SLIDE REVIEW: ABNORMAL
SODIUM BLD-SCNC: 143 MMOL/L (ref 136–145)
WBC # BLD: 6.7 K/UL (ref 4–11)

## 2022-10-06 PROCEDURE — 6370000000 HC RX 637 (ALT 250 FOR IP): Performed by: EMERGENCY MEDICINE

## 2022-10-06 PROCEDURE — 87635 SARS-COV-2 COVID-19 AMP PRB: CPT

## 2022-10-06 PROCEDURE — 93005 ELECTROCARDIOGRAM TRACING: CPT

## 2022-10-06 PROCEDURE — 80048 BASIC METABOLIC PNL TOTAL CA: CPT

## 2022-10-06 PROCEDURE — 99284 EMERGENCY DEPT VISIT MOD MDM: CPT

## 2022-10-06 PROCEDURE — 83735 ASSAY OF MAGNESIUM: CPT

## 2022-10-06 PROCEDURE — 85025 COMPLETE CBC W/AUTO DIFF WBC: CPT

## 2022-10-06 RX ORDER — POTASSIUM CHLORIDE 750 MG/1
10 TABLET, FILM COATED, EXTENDED RELEASE ORAL ONCE
Status: COMPLETED | OUTPATIENT
Start: 2022-10-06 | End: 2022-10-06

## 2022-10-06 RX ADMIN — POTASSIUM CHLORIDE 10 MEQ: 750 TABLET, FILM COATED, EXTENDED RELEASE ORAL at 05:27

## 2022-10-06 ASSESSMENT — ENCOUNTER SYMPTOMS
EYE ITCHING: 0
SHORTNESS OF BREATH: 0
ANAL BLEEDING: 0
EYE DISCHARGE: 0
EYE PAIN: 0
PHOTOPHOBIA: 0
COUGH: 0
CHOKING: 0
COLOR CHANGE: 0
BACK PAIN: 0
STRIDOR: 0
CHEST TIGHTNESS: 0
ABDOMINAL PAIN: 0
APNEA: 0
WHEEZING: 0
NAUSEA: 0
DIARRHEA: 0
ABDOMINAL DISTENTION: 0
RECTAL PAIN: 0
EYE REDNESS: 0
CONSTIPATION: 0
VOMITING: 0
BLOOD IN STOOL: 0

## 2022-10-06 NOTE — ED PROVIDER NOTES
I PERSONALLY SAW THE PATIENT AND PERFORMED A SUBSTANTIVE PORTION OF THE VISIT INCLUDING ALL ASPECTS OF THE MEDICAL DECISION MAKING PROCESS. [address]      Pt Name: Joan Hernandez  MRN: [id number]  Scotty 1960  Date of evaluation: 10/6/2022  Provider: Vicki Hearn MD    CHIEF COMPLAINT       Chief Complaint   Patient presents with    Hypertension       HISTORY OF PRESENT ILLNESS    Joan Hernandez is a 65 y.o. male who presents to the emergency department with high blood pressure. Patient presents with asymptomatic hypertension. Checked his blood pressure when he woke up this morning and it was elevated. Decided come to the emergency room. Is on blood pressure medication. Endorses compliance. No chest pain or shortness of breath. No headaches. No fevers or chills. States he feels fine otherwise. She is concerned that his blood pressure was 170/100. No other associated symptoms. Nursing Notes were reviewed. Including nursing noted for FM, Surgical History, Past Medical History, Social History, vitals, and allergies; agree with all. REVIEW OF SYSTEMS       Review of Systems   Constitutional:  Negative for activity change, appetite change, chills, diaphoresis, fatigue, fever and unexpected weight change. HENT:  Negative for congestion, dental problem, drooling, ear discharge and ear pain. Eyes:  Negative for photophobia, pain, discharge, redness, itching and visual disturbance. Respiratory:  Negative for apnea, cough, choking, chest tightness, shortness of breath, wheezing and stridor. Cardiovascular:  Negative for chest pain, palpitations and leg swelling. Gastrointestinal:  Negative for abdominal distention, abdominal pain, anal bleeding, blood in stool, constipation, diarrhea, nausea, rectal pain and vomiting. Endocrine: Negative for cold intolerance and heat intolerance.    Genitourinary:  Negative for decreased urine volume and urgency. Musculoskeletal:  Negative for arthralgias and back pain. Skin:  Negative for color change and pallor. Neurological:  Negative for tremors, facial asymmetry and weakness. Hematological:  Negative for adenopathy. Does not bruise/bleed easily. Psychiatric/Behavioral:  Negative for agitation, behavioral problems, confusion and decreased concentration. Except as noted above the remainder of the review of systems was reviewed and negative. PAST MEDICAL HISTORY     Past Medical History:   Diagnosis Date    A-fib ([address]     Broken teeth     COVID-19     Hypertension     Kidney stones        SURGICAL HISTORY       Past Surgical History:   Procedure Laterality Date    CARDIOVERSION      x2    CYSTOSCOPY Left 1/21/2022    CYSTOSCOPY, LEFT URETEROSCOPY, HOLMIUM LASER, STONE MANIPULATION performed by Camille Brown MD at [facility] [place] [facility] Left 1/21/2022    LEFT STENT PLACEMENT performed by Camille Brown MD at [address] [place] [address]      as a child       CURRENT MEDICATIONS       Previous Medications    FLECAINIDE (TAMBOCOR) 50 MG TABLET    Take 1 tablet by mouth 2 times daily    MAGNESIUM-OXIDE 400 (240 MG) MG TABLET    TAKE 1/2 TABLET BY MOUTH DAILY    METOPROLOL SUCCINATE (TOPROL XL) 25 MG EXTENDED RELEASE TABLET    Take 0.5 tablets by mouth in the morning.     POTASSIUM CHLORIDE (KLOR-CON M) 20 MEQ EXTENDED RELEASE TABLET    Take 1 tablet by mouth daily    SPIRONOLACTONE (ALDACTONE) 25 MG TABLET    Take 1 tablet by mouth daily    TAMSULOSIN (FLOMAX) 0.4 MG CAPSULE    Take 1 capsule by mouth daily    XARELTO 20 MG TABS TABLET    TAKE ONE TABLET BY MOUTH EVERY MORNING WITH BREAKFAST       ALLERGIES     Amoxicillin and Pcn [penicillins]    FAMILY HISTORY        Family History   Problem Relation Age of Onset    Diabetes Mother     High Blood Pressure Mother     Heart Disease Brother        SOCIAL HISTORY       Social History     Socioeconomic History Marital status:     Number of children: 1   Tobacco Use    Smoking status: Never    Smokeless tobacco: Never   Vaping Use    Vaping Use: Never used   Substance and Sexual Activity    Alcohol use: Yes     Comment: seldom    Drug use: Never    Sexual activity: Not Currently     Social Determinants of Health     Financial Resource Strain: Low Risk     Difficulty of Paying Living Expenses: Not hard at all   Food Insecurity: No Food Insecurity    Worried About Running Out of Food in the Last Year: Never true    Ran Out of Food in the Last Year: Never true   Transportation Needs: No Transportation Needs    Lack of Transportation (Medical): No    Lack of Transportation (Non-Medical): No       PHYSICAL EXAM       ED Triage Vitals [10/06/22 0415]   BP Temp Temp src Heart Rate Resp SpO2 Height Weight   (!) 158/96 97.3 °F (36.3 °C) -- 86 18 97 % 5' 11\" (1.803 m) 201 lb 15.1 oz (91.6 kg)       Physical Exam  Vitals and nursing note reviewed. Constitutional:       General: He is not in acute distress. Appearance: He is well-developed. He is not diaphoretic. HENT:      Head: Normocephalic and atraumatic. Eyes:      General:         Right eye: No discharge. Left eye: No discharge. Pupils: Pupils are equal, round, and reactive to light. Neck:      Thyroid: No thyromegaly. Trachea: No tracheal deviation. Cardiovascular:      Rate and Rhythm: Normal rate and regular rhythm. Heart sounds: No murmur heard. Pulmonary:      Breath sounds: No wheezing or rales. Chest:      Chest wall: No tenderness. Abdominal:      General: There is no distension. Palpations: Abdomen is soft. There is no mass. Tenderness: There is no abdominal tenderness. There is no guarding or rebound. Musculoskeletal:         General: No tenderness or deformity. Normal range of motion. Cervical back: Normal range of motion. Skin:     General: Skin is warm. Coloration: Skin is not pale. Findings: No erythema or rash. Neurological:      Mental Status: He is alert. Cranial Nerves: No cranial nerve deficit. Motor: No abnormal muscle tone. Coordination: Coordination normal.       DIAGNOSTIC RESULTS     ED BEDSIDE ULTRASOUND:   Performed by ED Physician - none    LABS:  Labs Reviewed   BASIC METABOLIC PANEL W/ REFLEX TO MG FOR LOW K - Abnormal; Notable for the following components:       Result Value    Potassium reflex Magnesium 3.4 (*)     All other components within normal limits   COVID-19, RAPID   CBC WITH AUTO DIFFERENTIAL   MAGNESIUM       All other labs were withinnormal range or not returned as of this dictation. EMERGENCY DEPARTMENT COURSE and DIFFERENTIAL DIAGNOSIS/MDM:     PMH, Surgical Hx, FH, Social Hx reviewed by myself (ETOH usage, Tobacco usage, Drug usage reviewed by myself, no pertinent Hx)- No Pertinent Hx     Old records were reviewed by me     MDM 71-year-old here for elevated blood pressure. Reassuring blood work. Asymptomatic otherwise. Outpatient follow-up for asymptomatic hypertension. Labs-   Diagnostic findings  Medications  Consults  Disposition  I estimate there is LOW risk for Sepsis, MI, Stroke, Tamponade, PTX, Toxicity or other life threatening etiology thus I consider the discharge disposition reasonable. The patient is at low risk for mortality based on demographic, history and clinical factors. Given the best available information and clinical assessment, I estimate the risk of hospitalization to be greater than risk of treatment at home. I have explained to the patient that the risk could rapidly change, given precautions for return and instructions. Explained to patient that the risk for mortality is low based on demographic, history and clinical factors. I discussed with patient the results of evaluation in the ED, diagnosis, care, and prognosis. The plan is to discharge to home.   Patient is in agreement with plan and questions have been answered. I also discussed with patient the reasons which may require a return visit and the importance of follow-up care. The patient is well-appearing, nontoxic, and improved at the time of discharge. Patient agrees to call to arrange follow-up care as directed. Patient understands to return immediately for worsening/change in symptoms. I PERSONALLY SAW THE PATIENT AND PERFORMED A SUBSTANTIVE PORTION OF THE VISIT INCLUDING ALL ASPECTS OF THE MEDICAL DECISION MAKING PROCESS. The primary clinician of record [id number] [facility]  TIME   Total Critical Caretime was 0 minutes, excluding separately reportable procedures. There was a high probability of clinically significant/life threatening deterioration in the patient's condition which required my urgent intervention. CRITICAL CARE  I personally saw the patient and independently provided 0 minutes of non-concurrent critical care out of the total shared critical care time provided. This excludes seperately billable procedures. Critical care time was provided for patient as above that required close evaluation and/or intervention with concern for potential patient decompensation. PROCEDURES:  Unlessotherwise noted below, none    FINAL IMPRESSION      1.  Primary hypertension          DISPOSITION/PLAN   DISPOSITION Decision To Discharge 10/06/2022 05:16:48 AM    PATIENT REFERRED TO:  Ranjeet Hoffmann MD  [address]  [phone]    Call today        DISCHARGE MEDICATIONS:  New Prescriptions    No medications on file          (Please note that portions ofthis note were completed with a voice recognition program.  Efforts were made to edit the dictations but occasionally words are mis-transcribed.)    Orlando Kaiser MD(electronically signed)  Attending Emergency Physician        Orlando Kaiser MD  10/06/22 7910

## 2022-10-06 NOTE — ED TRIAGE NOTES
Patient had high BP and HR readings at home, VSS here, does have a history of AFIB on blood thinners

## 2022-10-27 NOTE — PROGRESS NOTES
[address]   Electrophysiology      Date: 10/31/2022    Primary Cardiologist: James Duncan MD  PCP: Amie Sheffield MD     Chief Complaint:   Chief Complaint   Patient presents with    Follow-up     History of Present Illness:    I saw Arely Dozier in the office for electrophysiology follow up today. He is a 65 y.o. male with a past medical history of atrial fibrillation first noted in November 2021 when also diagnosed with Covid. He underwent successful cardioversion on 12/14/21 with Dr. Alejandra Jones and has plans for an upcoming atrial fibrillation ablation. He was back in atrial fibrillation when seen in the office on 12/21/21 and his flecainide was increased to 100mg BID. He underwent successful cardioversion on 12/30/21 but was back in atrial fibrillation about 5 minutes after. He ultimately underwent atrial fibrillation ablation (PVI, CAFE) and left atrial flutter (roof line, anterior wall) ablation on 1/27/22 with Dr. Alejandra Jones. Flecainide was stopped once he was 3 months out from his ablation and a 30 day monitor was ordered. Monitor showed predominately sinus but with multiple episodes of atrial fibrillation, longest being 1 hour and 19 minutes. He had recurrent, sustained atrial fibrillation on a monitor in April 2022. He underwent repeat atrial fibrillation and left atrial flutter ablation on 7/28/22 with Dr. Alejandra Jones. He presents today for follow up. He has been feeling well since his ablation. Denies any palpitations or dyspnea. No chest pain. No edema. No syncope. No bleeding issues. Allergies: Allergies   Allergen Reactions    Amoxicillin Other (See Comments)     Groin area extremely red-\"on fire\"-skin peels off from neck to feet-over 2 weeks    Pcn [Penicillins] Other (See Comments)     Groin area extremely red and skin peels off from neck to feet over 2 weeks     Home Medications:  Prior to Visit Medications    Medication Sig Taking?  Authorizing Provider   XARELTO 20 MG TABS tablet TAKE ONE TABLET BY MOUTH EVERY MORNING WITH BREAKFAST Yes Irene Rubalcava MD   MAGnesium-Oxide 400 (240 Mg) MG tablet TAKE 1/2 TABLET BY MOUTH DAILY Yes Noemí Vargas MD   potassium chloride (KLOR-CON M) 20 MEQ extended release tablet Take 1 tablet by mouth daily Yes Noemí Vargas MD   spironolactone (ALDACTONE) 25 MG tablet Take 1 tablet by mouth daily Yes Kayden Cantrell APRN - CNP   tamsulosin (FLOMAX) 0.4 MG capsule Take 1 capsule by mouth daily  Juliano Meza PA-C        Past Medical History:  Past Medical History:   Diagnosis Date    A-fib [facility])     Broken teeth     COVID-19     Hypertension     Kidney stones        Past Surgical History:   Past Surgical History:   Procedure Laterality Date    CARDIOVERSION      x2    CYSTOSCOPY Left 1/21/2022    CYSTOSCOPY, LEFT URETEROSCOPY, HOLMIUM LASER, STONE MANIPULATION performed by Jordana Aguilar MD at [address] Left 1/21/2022    LEFT STENT PLACEMENT performed by Jordana Aguilar MD at [address] [facility]      as a child       Social History:   reports that he has never smoked. He has never used smokeless tobacco. He reports current alcohol use. He reports that he does not use drugs. Family History:      Problem Relation Age of Onset    Diabetes Mother     High Blood Pressure Mother     Heart Disease Brother        Review of Systems   Constitutional:  Negative for chills, fatigue, fever and unexpected weight change. HENT:  Negative for congestion, hearing loss, sinus pressure, sore throat and trouble swallowing. Respiratory:  Negative for cough, shortness of breath and wheezing. Cardiovascular:  Negative for chest pain, palpitations and leg swelling. Gastrointestinal:  Negative for abdominal pain, blood in stool, constipation, diarrhea, nausea and vomiting. Genitourinary:  Negative for hematuria. Musculoskeletal:  Negative for arthralgias, back pain, gait problem and myalgias. Skin:  Negative for color change, rash and wound. 10/06/2022     10/06/2022    CO2 25 10/06/2022     Estimated Creatinine Clearance: 75 mL/min (based on SCr of 1.1 mg/dL). No results found for: BNP    Thyroid:   Lab Results   Component Value Date    TSH 2.99 11/13/2021     Lipid Panel:   Lab Results   Component Value Date/Time    CHOL 119 03/18/2020 03:24 PM    HDL 32 03/18/2020 03:24 PM    TRIG 68 03/18/2020 03:24 PM     LFTs:  Lab Results   Component Value Date    ALT 23 01/21/2022    AST 24 01/21/2022    ALKPHOS 77 01/21/2022    BILITOT 0.7 01/21/2022     Coags:   Lab Results   Component Value Date    PROTIME 11.5 08/09/2015    INR 1.06 08/09/2015    APTT 25.4 08/09/2015       ECG: 10/31/2022   SR at 71 BPM.    EP Procedures:   1. Successful DCCV for persistent atrial fibrillation, 12/14/21, Dr. Holley Cheatham  2. Successful DCCV for persistent atrial fibrillation, 12/30/21, Dr. Holley Cheatham  3. Atrial fibrillation ablation (PVI, CAFE) and left atrial flutter (roof line, anterior wall) ablation on 1/27/22, Dr. Bárbara Farfan:    Persistent atrial fibrillation/left atrial flutter   - A fib first seen on EKG on 11/13/21   - s/p successful DCCV on 12/14/21    - s/p successful DCCV on 12/30/21 but A fib reoccurred within 5 minutes   - s/p A fib (PVI, CAFE) and L flutter ablation on 1/27/22 with Dr. Holley Cheatham   - 30 day monitor 4/22 showed recurrent, sustained atrial fibrillation    - s/p repeat atrial fibrillation, L flutter ablation on 7/28/22 with Dr. Tasha Beth 1 (HTN) on Xarelto 20mg QD   - EKG today with SR   - stop flecainide and Toprol   - repeat 30 day monitor for A fib/flutter burden while off AAD    Essential HTN   - BP controlled   - continue medical therapy    Thank you for allowing to us to participate in the care of [facility]. Follow up with BALAJI Pizarro in 2 months.     BALAJI Pizarro  [facility] [address], [address] [facility]  Phone: [phone]  Fax: [phone]    Electronically signed by BALAJI Ordonez CNP on 10/31/2022 at 1:43 PM

## 2022-10-31 ENCOUNTER — TELEPHONE (OUTPATIENT)
Dept: CARDIOLOGY CLINIC | Age: 62
End: 2022-10-31

## 2022-10-31 ENCOUNTER — OFFICE VISIT (OUTPATIENT)
Dept: CARDIOLOGY CLINIC | Age: 62
End: 2022-10-31

## 2022-10-31 VITALS
OXYGEN SATURATION: 99 % | HEIGHT: 71 IN | WEIGHT: 198.4 LBS | HEART RATE: 69 BPM | SYSTOLIC BLOOD PRESSURE: 130 MMHG | DIASTOLIC BLOOD PRESSURE: 78 MMHG | BODY MASS INDEX: 27.77 KG/M2

## 2022-10-31 DIAGNOSIS — I48.19 PERSISTENT ATRIAL FIBRILLATION (HCC): Primary | ICD-10-CM

## 2022-10-31 DIAGNOSIS — I10 ESSENTIAL HYPERTENSION: ICD-10-CM

## 2022-10-31 DIAGNOSIS — I48.92 LEFT ATRIAL FLUTTER BY ELECTROCARDIOGRAM (HCC): ICD-10-CM

## 2022-10-31 PROCEDURE — 99214 OFFICE O/P EST MOD 30 MIN: CPT | Performed by: NURSE PRACTITIONER

## 2022-10-31 PROCEDURE — 3074F SYST BP LT 130 MM HG: CPT | Performed by: NURSE PRACTITIONER

## 2022-10-31 PROCEDURE — 3078F DIAST BP <80 MM HG: CPT | Performed by: NURSE PRACTITIONER

## 2022-10-31 PROCEDURE — 93000 ELECTROCARDIOGRAM COMPLETE: CPT | Performed by: NURSE PRACTITIONER

## 2022-10-31 ASSESSMENT — ENCOUNTER SYMPTOMS
SORE THROAT: 0
NAUSEA: 0
DIARRHEA: 0
VOMITING: 0
BACK PAIN: 0
TROUBLE SWALLOWING: 0
WHEEZING: 0
BLOOD IN STOOL: 0
SHORTNESS OF BREATH: 0
CONSTIPATION: 0
SINUS PRESSURE: 0
COLOR CHANGE: 0
COUGH: 0
ABDOMINAL PAIN: 0

## 2022-10-31 NOTE — TELEPHONE ENCOUNTER
Monitor placed by Yony Cano / [address]  Length of monitor 30 Day  Monitor ordered by Anabel Knight NP  Serial number [id number]  Kit ID [id number]  Activation successful prior to pt leaving office? Yes       Patient will call customer services to order more patches, pt understood instruction regarding showering , patch removal, charging, and when to return the monitor .

## 2022-11-04 RX ORDER — LANOLIN ALCOHOL/MO/W.PET/CERES
CREAM (GRAM) TOPICAL
Qty: 15 TABLET | Refills: 3 | Status: SHIPPED | OUTPATIENT
Start: 2022-11-04

## 2022-12-02 DIAGNOSIS — I48.19 PERSISTENT ATRIAL FIBRILLATION (HCC): ICD-10-CM

## 2022-12-02 DIAGNOSIS — I48.92 LEFT ATRIAL FLUTTER BY ELECTROCARDIOGRAM (HCC): ICD-10-CM

## 2022-12-09 RX ORDER — POTASSIUM CHLORIDE 20 MEQ/1
TABLET, EXTENDED RELEASE ORAL
Qty: 60 TABLET | Refills: 3 | Status: SHIPPED | OUTPATIENT
Start: 2022-12-09

## 2022-12-30 NOTE — PROGRESS NOTES
[facility]   Electrophysiology      Date: 1/3/2023    Primary Cardiologist: Bogdan Damon MD  PCP: Ricardo Mijares MD     Chief Complaint:   Chief Complaint   Patient presents with    Follow-up     Persistent atrial fibrillation. History of Present Illness:    I saw Aarti Ruelas in the office for electrophysiology follow up today. He is a 59 y.o. male with a past medical history of atrial fibrillation first noted in November 2021 when also diagnosed with Covid. He underwent successful cardioversion on 12/14/21 with Dr. Barbara Zhou and has plans for an upcoming atrial fibrillation ablation. He was back in atrial fibrillation when seen in the office on 12/21/21 and his flecainide was increased to 100mg BID. He underwent successful cardioversion on 12/30/21 but was back in atrial fibrillation about 5 minutes after. He ultimately underwent atrial fibrillation ablation (PVI, CAFE) and left atrial flutter (roof line, anterior wall) ablation on 1/27/22 with Dr. Barbara Zhou. Flecainide was stopped once he was 3 months out from his ablation and a 30 day monitor was ordered. Monitor showed predominately sinus but with multiple episodes of atrial fibrillation, longest being 1 hour and 19 minutes. He had recurrent, sustained atrial fibrillation on a monitor in April 2022. He underwent repeat atrial fibrillation and left atrial flutter ablation on 7/28/22 with Dr. Barbara Zhou. Flecainide and Toprol were stopped once he was 3 months out from ablation and a 30 day monitor was ordered. Monitor showed sinus with atrial runs up to 10 seconds long. He presents today for monitor results. He has been doing well since his last visit. He does note some rare palpitations that are not prolonged. No dyspnea or chest pain. No edema. No syncope. No bleeding issues. Allergies:   Allergies   Allergen Reactions    Amoxicillin Other (See Comments)     Groin area extremely red-\"on fire\"-skin peels off from neck to feet-over 2 weeks    Pcn [Penicillins] Other (See Comments)     Groin area extremely red and skin peels off from neck to feet over 2 weeks     Home Medications:  Prior to Visit Medications    Medication Sig Taking? Authorizing Provider   potassium chloride (KLOR-CON M) 20 MEQ extended release tablet TAKE 2 TABLETS BY MOUTH EVERY DAY AS NEEDED Yes Adeline Allen MD   magnesium oxide (MAG-OX) 400 (240 Mg) MG tablet TAKE 1/2 TABLET BY MOUTH DAILY Yes Adeline Allen MD   spironolactone (ALDACTONE) 25 MG tablet Take 1 tablet by mouth daily Yes Sean Bates, APRN - CNP        Past Medical History:  Past Medical History:   Diagnosis Date    A-fib ([address]     Broken teeth     COVID-19     Hypertension     Kidney stones        Past Surgical History:   Past Surgical History:   Procedure Laterality Date    CARDIOVERSION      x2    CYSTOSCOPY Left 1/21/2022    CYSTOSCOPY, LEFT URETEROSCOPY, HOLMIUM LASER, STONE MANIPULATION performed by Lillian Jamison MD at [address] Left 1/21/2022    LEFT STENT PLACEMENT performed by Lillian Jamison MD at [address] [place] [address]      as a child       Social History:   reports that he has never smoked. He has never used smokeless tobacco. He reports current alcohol use. He reports that he does not use drugs. Family History:      Problem Relation Age of Onset    Diabetes Mother     High Blood Pressure Mother     Heart Disease Brother        Review of Systems   Constitutional:  Negative for chills, fatigue, fever and unexpected weight change. HENT:  Negative for congestion, hearing loss, sinus pressure, sore throat and trouble swallowing. Respiratory:  Negative for cough, shortness of breath and wheezing. Cardiovascular:  Positive for palpitations. Negative for chest pain and leg swelling. Gastrointestinal:  Negative for abdominal pain, blood in stool, constipation, diarrhea, nausea and vomiting. Genitourinary:  Negative for hematuria.    Musculoskeletal:  Negative for arthralgias, back pain, gait problem and myalgias. Skin:  Negative for color change, rash and wound. Neurological:  Negative for dizziness, seizures, syncope, speech difficulty, weakness and light-headedness. Hematological:  Does not bruise/bleed easily. Physical Examination:  Vitals:    01/03/23 1548   BP: 138/88   Pulse: 72   SpO2: 99%      Wt Readings from Last 3 Encounters:   01/03/23 197 lb (89.4 kg)   10/31/22 198 lb 6.4 oz (90 kg)   10/06/22 201 lb 15.1 oz (91.6 kg)       Physical Exam  Vitals reviewed. Constitutional:       General: He is not in acute distress. Appearance: Normal appearance. HENT:      Head: Normocephalic and atraumatic. Nose: Nose normal.      Mouth/Throat:      Mouth: Mucous membranes are moist.   Eyes:      Conjunctiva/sclera: Conjunctivae normal.      Pupils: Pupils are equal, round, and reactive to light. Cardiovascular:      Rate and Rhythm: Normal rate and regular rhythm. Heart sounds: No murmur heard. No friction rub. No gallop. Pulmonary:      Effort: No respiratory distress. Breath sounds: No wheezing, rhonchi or rales. Abdominal:      General: Abdomen is flat. Bowel sounds are normal.      Palpations: Abdomen is soft. Musculoskeletal:         General: Normal range of motion. Right lower leg: No edema. Left lower leg: No edema. Skin:     General: Skin is warm and dry. Findings: No bruising. Neurological:      General: No focal deficit present. Mental Status: He is alert and oriented to person, place, and time. Motor: No weakness.    Psychiatric:         Mood and Affect: Mood normal.         Behavior: Behavior normal.        Pertinent labs, diagnostic, device, and imaging results reviewed as a part of this visit    LABS    CBC:   Lab Results   Component Value Date    WBC 6.7 10/06/2022    HGB 14.3 10/06/2022    HCT 42.3 10/06/2022    MCV 81.1 10/06/2022     10/06/2022     BMP:   Lab Results   Component Value Date    CREATININE 1.1 10/06/2022    BUN 12 10/06/2022     10/06/2022    K 3.4 (L) 10/06/2022     10/06/2022    CO2 25 10/06/2022     Estimated Creatinine Clearance: 74 mL/min (based on SCr of 1.1 mg/dL). No results found for: BNP    Thyroid:   Lab Results   Component Value Date    TSH 2.99 2021     Lipid Panel:   Lab Results   Component Value Date/Time    CHOL 119 2020 03:24 PM    HDL 32 2020 03:24 PM    TRIG 68 2020 03:24 PM     LFTs:  Lab Results   Component Value Date    ALT 23 2022    AST 24 2022    ALKPHOS 77 2022    BILITOT 0.7 2022     Coags:   Lab Results   Component Value Date    PROTIME 11.5 2015    INR 1.06 2015    APTT 25.4 2015       EC/3/2023   SR at 75 BPM. PVCs. EP Procedures:   1. Successful DCCV for persistent atrial fibrillation, 21, Dr. Chrissie Ramey  2. Successful DCCV for persistent atrial fibrillation, 21, Dr. Chrissie Ramey  3. Atrial fibrillation ablation (PVI, CAFE) and left atrial flutter (roof line, anterior wall) ablation on 22, Dr. Dea Cotton:    Persistent atrial fibrillation/left atrial flutter   - A fib first seen on EKG on 21   - s/p successful DCCV on 21    - s/p successful DCCV on 21 but A fib reoccurred within 5 minutes   - s/p A fib (PVI, CAFE) and L flutter ablation on 22 with Dr. Chrissie Ramey   - 30 day monitor  showed recurrent, sustained atrial fibrillation    - s/p repeat atrial fibrillation, L flutter ablation on 22 with Dr. Shahbaz Collier 1 (HTN) on Xarelto 20mg QD - will stop per pt preference   - EKG today with SR   - 30 day monitor 10/22 showed sinus with atrial runs   - continue to monitor for symptoms of atrial fibrillation/flutter and if symptomatic, should get EKG done    Essential HTN   - BP controlled   - continue medical therapy    Thank you for allowing to us to participate in the care of [facility].     Follow up with BALAJI Craig in 1 year.     BALAJI Craig  [facility], [address] [place] [address], [address] [place] [address]  Phone: [phone]  Fax: [phone]    Electronically signed by BALAJI Virgen CNP on 1/3/2023 at 4:16 PM

## 2023-01-03 ENCOUNTER — OFFICE VISIT (OUTPATIENT)
Dept: CARDIOLOGY CLINIC | Age: 63
End: 2023-01-03

## 2023-01-03 VITALS
OXYGEN SATURATION: 99 % | BODY MASS INDEX: 27.58 KG/M2 | HEIGHT: 71 IN | SYSTOLIC BLOOD PRESSURE: 138 MMHG | DIASTOLIC BLOOD PRESSURE: 88 MMHG | WEIGHT: 197 LBS | HEART RATE: 72 BPM

## 2023-01-03 DIAGNOSIS — I48.92 LEFT ATRIAL FLUTTER BY ELECTROCARDIOGRAM (HCC): ICD-10-CM

## 2023-01-03 DIAGNOSIS — I48.19 PERSISTENT ATRIAL FIBRILLATION (HCC): Primary | ICD-10-CM

## 2023-01-03 DIAGNOSIS — I10 ESSENTIAL HYPERTENSION: ICD-10-CM

## 2023-01-03 PROCEDURE — 93000 ELECTROCARDIOGRAM COMPLETE: CPT | Performed by: NURSE PRACTITIONER

## 2023-01-03 PROCEDURE — 3079F DIAST BP 80-89 MM HG: CPT | Performed by: NURSE PRACTITIONER

## 2023-01-03 PROCEDURE — 99213 OFFICE O/P EST LOW 20 MIN: CPT | Performed by: NURSE PRACTITIONER

## 2023-01-03 PROCEDURE — 3075F SYST BP GE 130 - 139MM HG: CPT | Performed by: NURSE PRACTITIONER

## 2023-01-03 ASSESSMENT — ENCOUNTER SYMPTOMS
NAUSEA: 0
SORE THROAT: 0
COUGH: 0
VOMITING: 0
SINUS PRESSURE: 0
SHORTNESS OF BREATH: 0
TROUBLE SWALLOWING: 0
COLOR CHANGE: 0
BACK PAIN: 0
DIARRHEA: 0
ABDOMINAL PAIN: 0
BLOOD IN STOOL: 0
CONSTIPATION: 0
WHEEZING: 0

## 2023-01-12 RX ORDER — MAGNESIUM OXIDE 400 MG/1
TABLET ORAL
Qty: 15 TABLET | Refills: 3 | Status: SHIPPED | OUTPATIENT
Start: 2023-01-12

## 2023-01-16 ENCOUNTER — OFFICE VISIT (OUTPATIENT)
Dept: FAMILY MEDICINE CLINIC | Age: 63
End: 2023-01-16

## 2023-01-16 VITALS
SYSTOLIC BLOOD PRESSURE: 124 MMHG | WEIGHT: 195.8 LBS | BODY MASS INDEX: 27.41 KG/M2 | HEART RATE: 68 BPM | HEIGHT: 71 IN | DIASTOLIC BLOOD PRESSURE: 80 MMHG | TEMPERATURE: 97.9 F

## 2023-01-16 DIAGNOSIS — I15.2 HYPERTENSION DUE TO ENDOCRINE DISORDER: ICD-10-CM

## 2023-01-16 DIAGNOSIS — E87.6 LOW BLOOD POTASSIUM: ICD-10-CM

## 2023-01-16 DIAGNOSIS — E87.6 LOW BLOOD POTASSIUM: Primary | ICD-10-CM

## 2023-01-16 LAB — POTASSIUM SERPL-SCNC: 4.8 MMOL/L (ref 3.5–5.1)

## 2023-01-16 PROCEDURE — 99999 PR OFFICE/OUTPT VISIT,PROCEDURE ONLY: CPT | Performed by: FAMILY MEDICINE

## 2023-01-16 RX ORDER — SPIRONOLACTONE 25 MG/1
25 TABLET ORAL DAILY
Qty: 90 TABLET | Refills: 3 | Status: SHIPPED | OUTPATIENT
Start: 2023-01-16

## 2023-01-16 RX ORDER — POTASSIUM CHLORIDE 20 MEQ/1
20 TABLET, EXTENDED RELEASE ORAL DAILY
Qty: 60 TABLET | Refills: 3
Start: 2023-01-16

## 2023-01-16 ASSESSMENT — PATIENT HEALTH QUESTIONNAIRE - PHQ9
SUM OF ALL RESPONSES TO PHQ QUESTIONS 1-9: 0
2. FEELING DOWN, DEPRESSED OR HOPELESS: 0
SUM OF ALL RESPONSES TO PHQ9 QUESTIONS 1 & 2: 0
1. LITTLE INTEREST OR PLEASURE IN DOING THINGS: 0
SUM OF ALL RESPONSES TO PHQ QUESTIONS 1-9: 0

## 2023-01-16 NOTE — PROGRESS NOTES
Subjective:      Patient ID: Nakul Castillo is a 59 y.o. male. Chief Complaint   Patient presents with    6 Month Follow-Up     hypertension        Patient presents with:  6 Month Follow-Up: hypertension    He has intentionally made some diet changes  He stopped pops and candy   Meds updated    Feeling ok   No abd pain  Bm ok no blood  Little blood with wiping at times no pain  Going on for two week  Urine is passing well no pain no blood   No sob no cp. If climbs steps fast can get winded not new    No ha     Off the anticoagulant    YOB: 1960    Date of Visit:  1/16/2023     -- Amoxicillin -- Other (See Comments)    --  Groin area extremely red-\"on fire\"-skin peels off             from neck to feet-over 2 weeks   -- Pcn [Penicillins] -- Other (See Comments)    --  Groin area extremely red and skin peels off from             neck to feet over 2 weeks    Current Outpatient Medications:  potassium chloride (KLOR-CON M) 20 MEQ extended release tablet, Take 1 tablet by mouth daily, Disp: 60 tablet, Rfl: 3  magnesium oxide (MAG-OX) 400 MG tablet, TAKE 1/2 TABLET BY MOUTH DAILY, Disp: 15 tablet, Rfl: 3  spironolactone (ALDACTONE) 25 MG tablet, Take 1 tablet by mouth daily, Disp: 90 tablet, Rfl: 3    No current facility-administered medications for this visit.      ----------------------------------                   01/16/23                             1451            ----------------------------------   BP:              124/80            Site:        Left Upper Arm        Position:        Sitting            Cuff Size:      Large Adult          Pulse:             68              Temp:      97.9 °F (36.6 °C)       TempSrc:        Temporal           Weight: 195 lb 12.8 oz (88.8 kg)   Height:     5' 11\" (1.803 m)      ----------------------------------  Body mass index is 27.31 kg/m².      Wt Readings from Last 3 Encounters:  01/16/23 : 195 lb 12.8 oz (88.8 kg)  01/03/23 : 197 lb (89.4 kg)  10/31/22 : 198 lb 6.4 oz (90 kg)    BP Readings from Last 3 Encounters:  01/16/23 : 124/80  01/03/23 : 138/88  10/31/22 : 130/78          Review of Systems    Objective:   Physical Exam  Constitutional:       General: He is not in acute distress. Appearance: Normal appearance. He is well-developed. He is not ill-appearing or diaphoretic. Cardiovascular:      Rate and Rhythm: Normal rate and regular rhythm. Heart sounds: Normal heart sounds. No murmur heard. No friction rub. No gallop. Comments:     Pulmonary:      Effort: Pulmonary effort is normal. No tachypnea, accessory muscle usage or respiratory distress. Breath sounds: Normal breath sounds. No decreased breath sounds, wheezing, rhonchi or rales. Abdominal:      General: Bowel sounds are normal. There is no distension or abdominal bruit. Palpations: Abdomen is soft. There is no hepatomegaly, splenomegaly, mass or pulsatile mass. Tenderness: There is no abdominal tenderness. There is no guarding. Lymphadenopathy:      Cervical: No cervical adenopathy. Upper Body:      Right upper body: No supraclavicular adenopathy. Left upper body: No supraclavicular adenopathy. Skin:     General: Skin is warm and dry. Coloration: Skin is not pale. Nails: There is no clubbing. Neurological:      Mental Status: He is alert. Assessment:       Diagnosis Orders   1. Low blood potassium  Potassium      2.  Hypertension due to endocrine disorder  Potassium        He declined to have colon check with either scope or stool   He has financial difficulty       Plan:      Consider the shingle vaccine  Continue the medicines  See in about 6 months         You Ledbetter MD

## 2023-01-17 DIAGNOSIS — I15.2 HYPERTENSION DUE TO ENDOCRINE DISORDER: Primary | ICD-10-CM

## 2023-02-16 RX ORDER — POTASSIUM CHLORIDE 20 MEQ/1
20 TABLET, EXTENDED RELEASE ORAL DAILY
Qty: 60 TABLET | Refills: 3 | Status: SHIPPED | OUTPATIENT
Start: 2023-02-16

## 2023-02-17 NOTE — TELEPHONE ENCOUNTER
[phone] ([place]) - Left msg for Kayode Rodas stating to have fasting labs done first week of March.

## 2023-03-02 DIAGNOSIS — D64.9 ANEMIA, UNSPECIFIED TYPE: ICD-10-CM

## 2023-03-02 DIAGNOSIS — I15.2 HYPERTENSION DUE TO ENDOCRINE DISORDER: ICD-10-CM

## 2023-03-02 LAB
FOLATE: 8.07 NG/ML (ref 4.78–24.2)
IRON SATURATION: 27 % (ref 20–50)
IRON: 80 UG/DL (ref 59–158)
POTASSIUM SERPL-SCNC: 3.7 MMOL/L (ref 3.5–5.1)
TOTAL IRON BINDING CAPACITY: 301 UG/DL (ref 260–445)
VITAMIN B-12: 317 PG/ML (ref 211–911)

## 2023-03-03 LAB
ANISOCYTOSIS: ABNORMAL
BASOPHILS ABSOLUTE: 0 K/UL (ref 0–0.2)
BASOPHILS RELATIVE PERCENT: 0.8 %
EOSINOPHILS ABSOLUTE: 0.1 K/UL (ref 0–0.6)
EOSINOPHILS RELATIVE PERCENT: 1.5 %
HCT VFR BLD CALC: 41.3 % (ref 40.5–52.5)
HEMOGLOBIN: 13.8 G/DL (ref 13.5–17.5)
LYMPHOCYTES ABSOLUTE: 1.4 K/UL (ref 1–5.1)
LYMPHOCYTES RELATIVE PERCENT: 24.7 %
MCH RBC QN AUTO: 27.4 PG (ref 26–34)
MCHC RBC AUTO-ENTMCNC: 33.5 G/DL (ref 31–36)
MCV RBC AUTO: 81.9 FL (ref 80–100)
MONOCYTES ABSOLUTE: 0.5 K/UL (ref 0–1.3)
MONOCYTES RELATIVE PERCENT: 9.5 %
NEUTROPHILS ABSOLUTE: 3.7 K/UL (ref 1.7–7.7)
NEUTROPHILS RELATIVE PERCENT: 63.5 %
OVALOCYTES: ABNORMAL
PDW BLD-RTO: 15.4 % (ref 12.4–15.4)
PLATELET # BLD: 193 K/UL (ref 135–450)
PLATELET SLIDE REVIEW: ADEQUATE
PMV BLD AUTO: 10.6 FL (ref 5–10.5)
POIKILOCYTES: ABNORMAL
RBC # BLD: 5.04 M/UL (ref 4.2–5.9)
SLIDE REVIEW: ABNORMAL
WBC # BLD: 5.8 K/UL (ref 4–11)

## 2023-03-28 RX ORDER — MAGNESIUM OXIDE TAB 400 MG (241.3 MG ELEMENTAL MG) 400 (241.3 MG) MG
TAB ORAL
Qty: 15 TABLET | Refills: 4 | Status: SHIPPED | OUTPATIENT
Start: 2023-03-28

## 2023-07-17 ENCOUNTER — OFFICE VISIT (OUTPATIENT)
Dept: FAMILY MEDICINE CLINIC | Age: 63
End: 2023-07-17

## 2023-07-17 VITALS
TEMPERATURE: 97.7 F | WEIGHT: 190 LBS | HEART RATE: 72 BPM | DIASTOLIC BLOOD PRESSURE: 82 MMHG | SYSTOLIC BLOOD PRESSURE: 128 MMHG | HEIGHT: 71 IN | BODY MASS INDEX: 26.6 KG/M2

## 2023-07-17 DIAGNOSIS — Z12.5 PROSTATE CANCER SCREENING: ICD-10-CM

## 2023-07-17 DIAGNOSIS — R79.89 ABNORMAL CBC: ICD-10-CM

## 2023-07-17 DIAGNOSIS — I10 ESSENTIAL HYPERTENSION: Primary | ICD-10-CM

## 2023-07-17 PROCEDURE — 99999 PR OFFICE/OUTPT VISIT,PROCEDURE ONLY: CPT | Performed by: FAMILY MEDICINE

## 2023-07-17 RX ORDER — PNV NO.95/FERROUS FUM/FOLIC AC 28MG-0.8MG
TABLET ORAL
COMMUNITY
End: 2023-07-17 | Stop reason: ALTCHOICE

## 2023-07-17 SDOH — ECONOMIC STABILITY: FOOD INSECURITY: WITHIN THE PAST 12 MONTHS, YOU WORRIED THAT YOUR FOOD WOULD RUN OUT BEFORE YOU GOT MONEY TO BUY MORE.: NEVER TRUE

## 2023-07-17 SDOH — ECONOMIC STABILITY: FOOD INSECURITY: WITHIN THE PAST 12 MONTHS, THE FOOD YOU BOUGHT JUST DIDN'T LAST AND YOU DIDN'T HAVE MONEY TO GET MORE.: NEVER TRUE

## 2023-07-17 SDOH — ECONOMIC STABILITY: HOUSING INSECURITY
IN THE LAST 12 MONTHS, WAS THERE A TIME WHEN YOU DID NOT HAVE A STEADY PLACE TO SLEEP OR SLEPT IN A SHELTER (INCLUDING NOW)?: NO

## 2023-07-17 SDOH — ECONOMIC STABILITY: INCOME INSECURITY: HOW HARD IS IT FOR YOU TO PAY FOR THE VERY BASICS LIKE FOOD, HOUSING, MEDICAL CARE, AND HEATING?: NOT HARD AT ALL

## 2023-07-17 ASSESSMENT — ENCOUNTER SYMPTOMS
SHORTNESS OF BREATH: 0
BLOOD IN STOOL: 0
DIARRHEA: 0
VOMITING: 0
NAUSEA: 0
CONSTIPATION: 0
ABDOMINAL PAIN: 0

## 2023-07-17 ASSESSMENT — PATIENT HEALTH QUESTIONNAIRE - PHQ9
1. LITTLE INTEREST OR PLEASURE IN DOING THINGS: 0
2. FEELING DOWN, DEPRESSED OR HOPELESS: 0
SUM OF ALL RESPONSES TO PHQ QUESTIONS 1-9: 0
SUM OF ALL RESPONSES TO PHQ QUESTIONS 1-9: 0
SUM OF ALL RESPONSES TO PHQ9 QUESTIONS 1 & 2: 0
SUM OF ALL RESPONSES TO PHQ QUESTIONS 1-9: 0
SUM OF ALL RESPONSES TO PHQ QUESTIONS 1-9: 0

## 2023-07-18 DIAGNOSIS — Z12.5 PROSTATE CANCER SCREENING: ICD-10-CM

## 2023-07-18 DIAGNOSIS — I10 ESSENTIAL HYPERTENSION: ICD-10-CM

## 2023-07-18 DIAGNOSIS — R79.89 ABNORMAL CBC: ICD-10-CM

## 2023-07-19 LAB
ANION GAP SERPL CALCULATED.3IONS-SCNC: 10 MMOL/L (ref 3–16)
BASOPHILS # BLD: 0.1 K/UL (ref 0–0.2)
BASOPHILS NFR BLD: 1.2 %
BUN SERPL-MCNC: 17 MG/DL (ref 7–20)
CALCIUM SERPL-MCNC: 9.4 MG/DL (ref 8.3–10.6)
CHLORIDE SERPL-SCNC: 107 MMOL/L (ref 99–110)
CO2 SERPL-SCNC: 28 MMOL/L (ref 21–32)
CREAT SERPL-MCNC: 1.1 MG/DL (ref 0.8–1.3)
DEPRECATED RDW RBC AUTO: 15.2 % (ref 12.4–15.4)
EOSINOPHIL # BLD: 0.1 K/UL (ref 0–0.6)
EOSINOPHIL NFR BLD: 2 %
GFR SERPLBLD CREATININE-BSD FMLA CKD-EPI: >60 ML/MIN/{1.73_M2}
GLUCOSE SERPL-MCNC: 97 MG/DL (ref 70–99)
HCT VFR BLD AUTO: 42.2 % (ref 40.5–52.5)
HGB BLD-MCNC: 14.4 G/DL (ref 13.5–17.5)
LYMPHOCYTES # BLD: 1.6 K/UL (ref 1–5.1)
LYMPHOCYTES NFR BLD: 28.7 %
MCH RBC QN AUTO: 28.8 PG (ref 26–34)
MCHC RBC AUTO-ENTMCNC: 34.2 G/DL (ref 31–36)
MCV RBC AUTO: 84.3 FL (ref 80–100)
MONOCYTES # BLD: 0.6 K/UL (ref 0–1.3)
MONOCYTES NFR BLD: 11.1 %
NEUTROPHILS # BLD: 3.1 K/UL (ref 1.7–7.7)
NEUTROPHILS NFR BLD: 57 %
OVALOCYTES BLD QL SMEAR: ABNORMAL
PLATELET # BLD AUTO: 193 K/UL (ref 135–450)
PLATELET BLD QL SMEAR: ADEQUATE
PMV BLD AUTO: 10.4 FL (ref 5–10.5)
POIKILOCYTOSIS BLD QL SMEAR: ABNORMAL
POTASSIUM SERPL-SCNC: 4.2 MMOL/L (ref 3.5–5.1)
PSA SERPL DL<=0.01 NG/ML-MCNC: 3.2 NG/ML (ref 0–4)
RBC # BLD AUTO: 5.01 M/UL (ref 4.2–5.9)
SLIDE REVIEW: ABNORMAL
SODIUM SERPL-SCNC: 145 MMOL/L (ref 136–145)
WBC # BLD AUTO: 5.4 K/UL (ref 4–11)

## 2023-08-17 ENCOUNTER — TELEPHONE (OUTPATIENT)
Dept: FAMILY MEDICINE CLINIC | Age: 63
End: 2023-08-17

## 2023-08-17 NOTE — TELEPHONE ENCOUNTER
Nothing  Observe for symptoms  Be careful of being around others or using a mask for the next 3 days   Call if problem

## 2023-08-17 NOTE — TELEPHONE ENCOUNTER
Patient was exposed to someone who tested positive for covid yesterday. He is having no symptoms and wants to know if there is anything he needs to do? Please advise.

## 2023-10-16 RX ORDER — LANOLIN ALCOHOL/MO/W.PET/CERES
200 CREAM (GRAM) TOPICAL DAILY
Qty: 15 TABLET | Refills: 4 | Status: SHIPPED | OUTPATIENT
Start: 2023-10-16

## 2024-01-18 ENCOUNTER — OFFICE VISIT (OUTPATIENT)
Dept: FAMILY MEDICINE CLINIC | Age: 64
End: 2024-01-18

## 2024-01-18 VITALS
HEIGHT: 71 IN | WEIGHT: 187.4 LBS | BODY MASS INDEX: 26.23 KG/M2 | SYSTOLIC BLOOD PRESSURE: 138 MMHG | DIASTOLIC BLOOD PRESSURE: 88 MMHG | TEMPERATURE: 98.8 F | HEART RATE: 72 BPM

## 2024-01-18 DIAGNOSIS — E87.6 HYPOKALEMIA: ICD-10-CM

## 2024-01-18 DIAGNOSIS — I10 ESSENTIAL HYPERTENSION: Primary | ICD-10-CM

## 2024-01-18 PROCEDURE — 99999 PR OFFICE/OUTPT VISIT,PROCEDURE ONLY: CPT | Performed by: FAMILY MEDICINE

## 2024-01-18 RX ORDER — SPIRONOLACTONE 25 MG/1
25 TABLET ORAL DAILY
Qty: 90 TABLET | Refills: 2 | Status: SHIPPED | OUTPATIENT
Start: 2024-01-18

## 2024-01-18 RX ORDER — POTASSIUM CHLORIDE 20 MEQ/1
20 TABLET, EXTENDED RELEASE ORAL DAILY
Qty: 90 TABLET | Refills: 2 | Status: SHIPPED | OUTPATIENT
Start: 2024-01-18

## 2024-01-18 RX ORDER — MAGNESIUM 200 MG
200 TABLET ORAL DAILY
Qty: 90 TABLET | Refills: 2 | Status: SHIPPED | OUTPATIENT
Start: 2024-01-18

## 2024-01-18 ASSESSMENT — PATIENT HEALTH QUESTIONNAIRE - PHQ9
SUM OF ALL RESPONSES TO PHQ QUESTIONS 1-9: 0
SUM OF ALL RESPONSES TO PHQ QUESTIONS 1-9: 0
1. LITTLE INTEREST OR PLEASURE IN DOING THINGS: 0
2. FEELING DOWN, DEPRESSED OR HOPELESS: 0
SUM OF ALL RESPONSES TO PHQ9 QUESTIONS 1 & 2: 0
SUM OF ALL RESPONSES TO PHQ QUESTIONS 1-9: 0
SUM OF ALL RESPONSES TO PHQ QUESTIONS 1-9: 0

## 2024-01-18 ASSESSMENT — ENCOUNTER SYMPTOMS
SHORTNESS OF BREATH: 0
ABDOMINAL PAIN: 0
DIARRHEA: 0
CONSTIPATION: 0
BLOOD IN STOOL: 0

## 2024-01-18 NOTE — PROGRESS NOTES
Subjective:      Patient ID: Hoang Brandon is a 64 y.o. male.    Chief Complaint   Patient presents with    6 Month Follow-Up     Hypertension, lipids        Patient presents with:  6 Month Follow-Up: Hypertension, lipids    Here for the above   Was loading boxes and no injury. Started hurting later in afternoon yesteday  Points to dorsal wrist   Use tylenol and helped  He is better today     YOB: 1960    Date of Visit:  1/18/2024     -- Amoxicillin -- Other (See Comments)    --  Groin area extremely red-\"on fire\"-skin peels off             from neck to feet-over 2 weeks   -- Pcn [Penicillins] -- Other (See Comments)    --  Groin area extremely red and skin peels off from             neck to feet over 2 weeks    Current Outpatient Medications:  magnesium oxide (MAG-OX) 400 (240 Mg) MG tablet, TAKE 1/2 TABLET BY MOUTH DAILY, Disp: 15 tablet, Rfl: 4  potassium chloride (KLOR-CON M) 20 MEQ extended release tablet, Take 1 tablet by mouth daily, Disp: 60 tablet, Rfl: 3  spironolactone (ALDACTONE) 25 MG tablet, Take 1 tablet by mouth daily, Disp: 90 tablet, Rfl: 3  Multiple Vitamins-Minerals (MULTIVITAMIN MEN 50+ PO), Take by mouth (Patient not taking: Reported on 1/18/2024), Disp: , Rfl:     No current facility-administered medications for this visit.      -------------------------------                 01/18/24                          1503           -------------------------------   BP:            138/88           Site:      Left Upper Arm       Position:       Sitting          Cuff Size:    Medium Adult        Pulse:           72             Temp:     98.8 °F (37.1 °C)     TempSrc:      Temporal          Weight: 85 kg (187 lb 6.4 oz)   Height:   1.803 m (5' 11\")     -------------------------------  Body mass index is 26.14 kg/m².     Wt Readings from Last 3 Encounters:  01/18/24 : 85 kg (187 lb 6.4 oz)  07/17/23 : 86.2 kg (190 lb)  01/16/23 : 88.8 kg (195 lb 12.8

## 2024-01-18 NOTE — PATIENT INSTRUCTIONS
Use one of two things either aleve twice a day for about 7 to 10 days or obtain a product over the counter called voltaren gel and the generic is fine called diclofenac gel. Use it about 3 times a day according to instructions for the same length of time   Do keep the visit with the cardiologist in 2/29   See me in about 6 months

## 2024-02-28 NOTE — PROGRESS NOTES
prolongation.     Studies:   1. Event monitor:       2. Echo: 02/22/2022     Summary   Left ventricular cavity size is normal.   Normal left ventricular wall thickness.   Ejection fraction is visually estimated to be 50-55%.   Regional wall motion abnormalities are noted.   Indeterminate diastolic function.   Normal right ventricular size and function.   no valvular stenosis    3. Stress Test:        4. Cath:     I independently reviewed and interpreted the ECG, MCOT, echocardiogram, stress test, and coronary angiography/PCI results and used them for my plan of care.      Procedures:  Successful external DC cardioversion of symptomatic, persistent atrial fibrillation. 12/14/2021.  Successful external DC cardioversion of symptomatic, persistent atrial fibrillation. Return of atrial fibrillation within 5 minutes. 12/30/201.  Electrophysiology study with radiofrequency ablation of atrial fibrillation, left atrial flutter and pulmonary vein isolation. 01/27/2022  Electrophysiology study with radiofrequency ablation of atrial fibrillation, left atrial flutter and pulmonary vein isolation. 07/28/2022.    Assessment/Plan:     Persistent atrial fibrillation / Atypical atrial flutter  -First seen on EKG 11/15/2021  -He has a [id number] Score 1 (HTN)   -  According to the AHA/ACC/HRS guidelines, with a [id number] score of 1, the patient is not indicated for OAC at this time.  Start ASA 81 mg daily.    Patient will be started on new medication ASA 81 mg daily.  Patient verbalizes understanding of the need for treatment and education provided at today's visit. Additional education materials will be provided in the AVS.     - s/p RFA atrial fibrillation and atypical atrial flutter 01/27/2022 & 07/28/2022    .  Recommend 30 day monitor to assess for underlying arrhthymias which may be contributing to his symptoms of palpitations. If he does not experience his palpitations during the time he wears he monitor, I recommend

## 2024-02-29 ENCOUNTER — TELEPHONE (OUTPATIENT)
Dept: FAMILY MEDICINE CLINIC | Age: 64
End: 2024-02-29

## 2024-02-29 ENCOUNTER — OFFICE VISIT (OUTPATIENT)
Dept: CARDIOLOGY CLINIC | Age: 64
End: 2024-02-29

## 2024-02-29 ENCOUNTER — TELEPHONE (OUTPATIENT)
Dept: CARDIOLOGY CLINIC | Age: 64
End: 2024-02-29

## 2024-02-29 VITALS
HEIGHT: 71 IN | OXYGEN SATURATION: 97 % | WEIGHT: 184 LBS | DIASTOLIC BLOOD PRESSURE: 74 MMHG | SYSTOLIC BLOOD PRESSURE: 126 MMHG | HEART RATE: 91 BPM | BODY MASS INDEX: 25.76 KG/M2

## 2024-02-29 DIAGNOSIS — I48.19 PERSISTENT ATRIAL FIBRILLATION (HCC): Primary | ICD-10-CM

## 2024-02-29 DIAGNOSIS — I48.4 ATYPICAL ATRIAL FLUTTER (HCC): ICD-10-CM

## 2024-02-29 DIAGNOSIS — Z79.01 CURRENT USE OF ANTICOAGULANT THERAPY: ICD-10-CM

## 2024-02-29 PROCEDURE — 99215 OFFICE O/P EST HI 40 MIN: CPT | Performed by: INTERNAL MEDICINE

## 2024-02-29 PROCEDURE — 3074F SYST BP LT 130 MM HG: CPT | Performed by: INTERNAL MEDICINE

## 2024-02-29 PROCEDURE — 93000 ELECTROCARDIOGRAM COMPLETE: CPT | Performed by: INTERNAL MEDICINE

## 2024-02-29 PROCEDURE — 3078F DIAST BP <80 MM HG: CPT | Performed by: INTERNAL MEDICINE

## 2024-02-29 NOTE — PATIENT INSTRUCTIONS
If you have any question regarding your loop implant please contact Nurse Whitney at [phone].    We will call with the results of the monitor if you do not experience any symptoms during the time your are wearing the monitor we recommend implantation of loop recorder.    Loop Recorder Implantation Pre Procedure Instructions     Date:____________________________     Arrive at:_________________________     Procedure time:_____________________        The morning of your procedure you will park in the [facility] parking lot and report directly to the cath lab to check in.  At the information desk stay right and go all the way to the end of the walter, this will take you directly to your check in desk for the cath lab.       Pre-Procedure Instructions  Do not use any lotions, creams or perfume the morning of procedure.  You will need to hold your Aspirin for 7 days prior to the procedure.   If you have a smart phone (apple or android)please bring with you the day of the procedure as well as your leslie store password (android or apple).  If you do not have a smart phone that is ok as you will be issued a base station which you will place at your bedside.   Cath lab will provide you with all post procedure instructions.  You will follow up one week after implantation for implantation site check to make sure it is healing well.

## 2024-02-29 NOTE — TELEPHONE ENCOUNTER
I saw he had complained of ongoing fatigue  When he saw the cardiologist   With the borderline cbc he had last year  He should consider seeing the hematologist   We had discussed before and he declined  But will offer again   Dr pierce

## 2024-02-29 NOTE — TELEPHONE ENCOUNTER
Monitor placed by Jo Ann Brown  Monitor company [employer]  Length of monitor 30 days  Monitor ordered by Dr. Hargrove  Serial number [id number]  Kit ID   Activation successful prior to pt leaving office? Yes      Placed on spreadsheet.    
patient refused

## 2024-02-29 NOTE — TELEPHONE ENCOUNTER
Hoang advised and states he didn't mention anything to Cardiologist about ongoing fatigue. He states he mentioned that he feels episodes lasting around 40 seconds of his afib creeping back up.

## 2024-04-01 NOTE — PLAN OF CARE
HEARING AID DROP OFF    Audiology     Date of purchase/dispense date: 5/17/18    repair warranty expiration date:5 /17/19    L/D expiration date: 5/17/19, RIGHT SIDE REPLACED UNDER L AND D ON 12/19/18   : [employer]onak .  Replaced right reciever on 7/14/21 .  Replaced left  on 07/25/2023  Model/style/color: [id number]   Serial # right: [id number] // Serial # left: [id number]   /retention loop: 2 STANDARD   Battery size: 312   Earmold :   ---Earmold style and material:   ---Earmold serial # right: left:   ---Earmold warranty expiration date:   ---Tubing:   Dome: Right: LARGE CLOSED // Left: LARGE CLOSED  9/3/30 changed to size MEDIUM POWER DOMES BILATERALLY   Supplies/wax filters: CERUSTOP   Accessory: Phonak COMPILOT II   ---Accessory serial # //   ---Accessory warranty expiration date:    used:   Payor: Commercial AND   DVR     Updated by ZANE Banks on 6/5/2018    REASON FOR VISIT:  Patient drops off his right-sided hearing aid with the  snapped off.    SERVICES PROVIDED:  Replaced  and size medium power dome.  Normal function is restored.    RECOMMENDATIONS:  Patient will be contacted to  the hearing aid at his earliest convenience.    FOLLOW-UP:  As needed     Problem: Infection:  Goal: Will remain free from infection  Description: Will remain free from infection  1/23/2022 1048 by Víctor Gross RN  Outcome: Ongoing  1/23/2022 0312 by Kumar Pavon RN  Outcome: Ongoing     Problem: Safety:  Goal: Free from accidental physical injury  Description: Free from accidental physical injury  1/23/2022 1048 by Víctor Gross RN  Outcome: Ongoing  1/23/2022 0312 by Kumar Pavon RN  Outcome: Ongoing  Goal: Free from intentional harm  Description: Free from intentional harm  1/23/2022 1048 by Víctor Gross RN  Outcome: Ongoing  1/23/2022 0312 by Kumar Pavon RN  Outcome: Ongoing     Problem: Daily Care:  Goal: Daily care needs are met  Description: Daily care needs are met  1/23/2022 1048 by Víctor Gross RN  Outcome: Ongoing  1/23/2022 0312 by Kumar Pavon RN  Outcome: Ongoing     Problem: Pain:  Goal: Patient's pain/discomfort is manageable  Description: Patient's pain/discomfort is manageable  1/23/2022 1048 by Víctor Gross RN  Outcome: Ongoing  1/23/2022 0312 by Kumar Pavon RN  Outcome: Ongoing     Problem: Discharge Planning:  Goal: Patients continuum of care needs are met  Description: Patients continuum of care needs are met  1/23/2022 1048 by Víctor Gross RN  Outcome: Ongoing  1/23/2022 0312 by Kumar Pavon RN  Outcome: Ongoing

## 2024-04-11 ENCOUNTER — TELEPHONE (OUTPATIENT)
Dept: CARDIOLOGY CLINIC | Age: 64
End: 2024-04-11

## 2024-04-11 NOTE — TELEPHONE ENCOUNTER
Called patient to discuss results of monitor no answer left message.    The monitor was worn from 03/02/2024-03/31/2024  Sinus rhythm with 3 episodes of atrial runs longest lasting 10 seconds. (Nonspecific finding)    Patient has history of ablation and was seen in office on 02/29/2024 reporting symptoms of palpitations and monitor was placed. Plan was to implant ILR if he did not have palpitations during the time he wore the monitor, so when patient calls back ask if he did have the palpitations and if not if he wants to implant loop.

## 2024-04-12 DIAGNOSIS — I48.19 PERSISTENT ATRIAL FIBRILLATION (HCC): Primary | ICD-10-CM

## 2024-04-12 DIAGNOSIS — I48.4 ATYPICAL ATRIAL FLUTTER (HCC): ICD-10-CM

## 2024-04-12 NOTE — TELEPHONE ENCOUNTER
Hoang left voice message  on our over night answering service @ 5.15 pm  to get the results form is monitor

## 2024-04-12 NOTE — TELEPHONE ENCOUNTER
Spoke with patient with results outlined below. Patient verbalized understanding. He denied having palpitations during the time of the 30 day monitor. Discussed ILR as previously discussed in office visit note with Dr. Hargrove. He states he will further deliberate undergoing ILR implantation and will call office if he decides to proceed. Patient requesting more information to be mailed regarding ILR. Information gathered an placed in outgoing mail. Patient requesting appt with Dr. Chin since Dr. Hargrove will be leaving. Appt scheduled. Verbalized understanding.

## 2024-06-06 RX ORDER — LANOLIN ALCOHOL/MO/W.PET/CERES
200 CREAM (GRAM) TOPICAL DAILY
Qty: 15 TABLET | Refills: 4 | Status: SHIPPED | OUTPATIENT
Start: 2024-06-06

## 2024-07-18 ENCOUNTER — OFFICE VISIT (OUTPATIENT)
Dept: FAMILY MEDICINE CLINIC | Age: 64
End: 2024-07-18

## 2024-07-18 VITALS
DIASTOLIC BLOOD PRESSURE: 80 MMHG | HEIGHT: 71 IN | HEART RATE: 76 BPM | WEIGHT: 192 LBS | TEMPERATURE: 98.1 F | BODY MASS INDEX: 26.88 KG/M2 | SYSTOLIC BLOOD PRESSURE: 128 MMHG

## 2024-07-18 DIAGNOSIS — Z12.5 PROSTATE CANCER SCREENING: ICD-10-CM

## 2024-07-18 DIAGNOSIS — R79.89 ABNORMAL CBC: ICD-10-CM

## 2024-07-18 DIAGNOSIS — I10 ESSENTIAL HYPERTENSION: ICD-10-CM

## 2024-07-18 DIAGNOSIS — I10 ESSENTIAL HYPERTENSION: Primary | ICD-10-CM

## 2024-07-18 LAB — PSA SERPL DL<=0.01 NG/ML-MCNC: 3.13 NG/ML (ref 0–4)

## 2024-07-18 SDOH — ECONOMIC STABILITY: FOOD INSECURITY: WITHIN THE PAST 12 MONTHS, THE FOOD YOU BOUGHT JUST DIDN'T LAST AND YOU DIDN'T HAVE MONEY TO GET MORE.: NEVER TRUE

## 2024-07-18 SDOH — ECONOMIC STABILITY: FOOD INSECURITY: WITHIN THE PAST 12 MONTHS, YOU WORRIED THAT YOUR FOOD WOULD RUN OUT BEFORE YOU GOT MONEY TO BUY MORE.: NEVER TRUE

## 2024-07-18 SDOH — ECONOMIC STABILITY: INCOME INSECURITY: HOW HARD IS IT FOR YOU TO PAY FOR THE VERY BASICS LIKE FOOD, HOUSING, MEDICAL CARE, AND HEATING?: NOT HARD AT ALL

## 2024-07-18 ASSESSMENT — ENCOUNTER SYMPTOMS
BLOOD IN STOOL: 0
SHORTNESS OF BREATH: 0
ABDOMINAL PAIN: 0
CONSTIPATION: 0
DIARRHEA: 0

## 2024-07-18 NOTE — PROGRESS NOTES
Subjective:      Patient ID: Hoang Brandon is a 64 y.o. male.    Chief Complaint   Patient presents with    6 Month Follow-Up     Hypertension         Patient presents with:  6 Month Follow-Up: Hypertension     Here for the above   He is well   He has no c/o    Meds noted    YOB: 1960    Date of Visit:  7/18/2024     -- Amoxicillin -- Other (See Comments)    --  Groin area extremely red-\"on fire\"-skin peels off             from neck to feet-over 2 weeks   -- Pcn [Penicillins] -- Other (See Comments)    --  Groin area extremely red and skin peels off from             neck to feet over 2 weeks    Current Outpatient Medications:  magnesium oxide (MAG-OX) 400 (240 Mg) MG tablet, TAKE 1/2 TABLET BY MOUTH DAILY, Disp: 15 tablet, Rfl: 4  potassium chloride (KLOR-CON M) 20 MEQ extended release tablet, Take 1 tablet by mouth daily, Disp: 90 tablet, Rfl: 2  spironolactone (ALDACTONE) 25 MG tablet, Take 1 tablet by mouth daily, Disp: 90 tablet, Rfl: 2  magnesium 200 MG TABS tablet, Take 1 tablet by mouth daily, Disp: 90 tablet, Rfl: 2  Multiple Vitamins-Minerals (MULTIVITAMIN MEN 50+ PO), Take by mouth (Patient not taking: Reported on 1/18/2024), Disp: , Rfl:     No current facility-administered medications for this visit.      ---------------------------               07/18/24                      1436         ---------------------------   BP:          128/80         Site:    Left Upper Arm     Position:     Sitting        Cuff Size:  Medium Adult      Pulse:         76           Temp:   98.1 °F (36.7 °C)   TempSrc:    Temporal        Weight: 87.1 kg (192 lb)    Height: 1.803 m (5' 11\")   ---------------------------  Body mass index is 26.78 kg/m².     Wt Readings from Last 3 Encounters:  07/18/24 : 87.1 kg (192 lb)  02/29/24 : 83.5 kg (184 lb)  01/18/24 : 85 kg (187 lb 6.4 oz)    BP Readings from Last 3 Encounters:  07/18/24 : 128/80  02/29/24 : 126/74  01/18/24 :

## 2024-07-18 NOTE — PATIENT INSTRUCTIONS
Continue the diet efforts and the medicines  Consider seeing the hematology people at  for the blood count   See in 6 month

## 2024-07-19 LAB
ALBUMIN SERPL-MCNC: 3.9 G/DL (ref 3.4–5)
ALBUMIN/GLOB SERPL: 1.3 {RATIO} (ref 1.1–2.2)
ALP SERPL-CCNC: 105 U/L (ref 40–129)
ALT SERPL-CCNC: 20 U/L (ref 10–40)
ANION GAP SERPL CALCULATED.3IONS-SCNC: 10 MMOL/L (ref 3–16)
AST SERPL-CCNC: 27 U/L (ref 15–37)
BILIRUB SERPL-MCNC: 0.6 MG/DL (ref 0–1)
BUN SERPL-MCNC: 17 MG/DL (ref 7–20)
CALCIUM SERPL-MCNC: 9.2 MG/DL (ref 8.3–10.6)
CHLORIDE SERPL-SCNC: 104 MMOL/L (ref 99–110)
CO2 SERPL-SCNC: 30 MMOL/L (ref 21–32)
CREAT SERPL-MCNC: 1 MG/DL (ref 0.8–1.3)
GFR SERPLBLD CREATININE-BSD FMLA CKD-EPI: 84 ML/MIN/{1.73_M2}
GLUCOSE SERPL-MCNC: 80 MG/DL (ref 70–99)
POTASSIUM SERPL-SCNC: 3.5 MMOL/L (ref 3.5–5.1)
PROT SERPL-MCNC: 6.9 G/DL (ref 6.4–8.2)
SODIUM SERPL-SCNC: 144 MMOL/L (ref 136–145)

## 2024-07-26 NOTE — PROGRESS NOTES
Cardiac Electrophysiology Consultation   Date: 7/31/2024   Reason for Consultation: Atrial fibrillation   Consult Requesting Physician: Jesus Hargrove MD     Chief Complaint:   Chief Complaint   Patient presents with    Follow-up     No cc      HPI: Hoang Brandon is a 64 y.o. patient with a history of hypertension and atrial fibrillation. He previously followed with Aníbal aHrgrove MD  after he presented to the ED 11/12/21 with worsening shortness of breath, fatigue and Covid. He was found to be in atrial fibrillation with RVR. He underwent successful DCCV 12/14/21. He was seen in office with Andreia Charles NP 12/21/21 and EKG showed Afib with v-rate 103. Flecainide was increased and underwent successful DCCV 12/30/21. He opted to proceed with atrial fibrillation and left atrial flutter ablation 1/27/22 with Dr. Hargrove. Flecainide and Cardizem were discontinued at follow up and event monitor 5/2022 showed 3% atrial fibrillation burden. His medications were restarted with Flecainide 50 mg BID and Toprol XL 12.5 mg. He underwent repeat radiofrequency ablation of atrial fibrillation and atrial flutter on 7/28/22.     He was seen in outpatient follow up 10/31/22 and medications were discontinued, 30 day event monitor showed sinus rhythm with atrial runs.     Today, he presents to office for management of atrial fibrillation. He reports he is feeling well and denies symptomatic return of atrial fibrillation. When he was in atrial fibrillation in the past he was symptomatic with fatigue.       Past Medical History:   Diagnosis Date    A-fib (HCC)     Broken teeth     COVID-19     Hypertension     Kidney stones       Past Surgical History:   Procedure Laterality Date    CARDIOVERSION      x2    CYSTOSCOPY Left 1/21/2022    CYSTOSCOPY, LEFT URETEROSCOPY, HOLMIUM LASER, STONE MANIPULATION performed by Afsaneh Dinh MD at [facility] OR    CYSTOSCOPY Left 1/21/2022    LEFT STENT PLACEMENT performed by Afsaneh MUSTAFA 
off from neck to feet-over 2 weeks    Pcn [Penicillins] Other (See Comments)     Groin area extremely red and skin peels off from neck to feet over 2 weeks       Medication:   Prior to Admission medications    Medication Sig Start Date End Date Taking? Authorizing Provider   magnesium oxide (MAG-OX) 400 (240 Mg) MG tablet TAKE 1/2 TABLET BY MOUTH DAILY 6/6/24   Jesus Hargrove MD   potassium chloride (KLOR-CON M) 20 MEQ extended release tablet Take 1 tablet by mouth daily 1/18/24   Jesus Hargrove MD   spironolactone (ALDACTONE) 25 MG tablet Take 1 tablet by mouth daily 1/18/24   Jesus Hargrove MD   magnesium 200 MG TABS tablet Take 1 tablet by mouth daily 1/18/24   Jesus Hargrove MD   Multiple Vitamins-Minerals (MULTIVITAMIN MEN 50+ PO) Take by mouth  Patient not taking: Reported on 1/18/2024    ProviderBuddy MD       Social History:   reports that he has never smoked. He has never used smokeless tobacco. He reports current alcohol use. He reports that he does not use drugs.      Family History:  family history includes Diabetes in his mother; Heart Disease in his brother; High Blood Pressure in his mother.   Reviewed. Denies family history of sudden cardiac death, arrhythmia, premature CAD    Review of System:  Pertinent positive and negatives are in the HPI, the rest are negative.     Physical Examination:  There were no vitals taken for this visit.     General: Calm and not in acute distress.   HEENT: Atraumatic normocephalic. Normal eye movements.   Neck: No JVP.  Cardiac: ***RRR, no appreciable murmurs.   Lungs: Not labored. Clear to ausculation.   Extremities. No pitting edema  Neurology: A&O x3.   Psychiatry: Normal affect and mood.    [id number] Score for Atrial Fibrillation Stroke Risk   Risk   Factors  Component Value   C CHF No 0   H HTN Yes 1   A2 Age >= 75 No,  (64 y.o.) 0   D DM No 0   S2 Prior Stroke/TIA No 0   V Vascular Disease No 0   A Age 65-74 No,  (64 y.o.) 0   Sc Sex male 0

## 2024-07-31 ENCOUNTER — OFFICE VISIT (OUTPATIENT)
Dept: CARDIOLOGY CLINIC | Age: 64
End: 2024-07-31

## 2024-07-31 VITALS
WEIGHT: 190 LBS | SYSTOLIC BLOOD PRESSURE: 134 MMHG | HEART RATE: 76 BPM | OXYGEN SATURATION: 99 % | DIASTOLIC BLOOD PRESSURE: 88 MMHG | BODY MASS INDEX: 26.5 KG/M2

## 2024-07-31 DIAGNOSIS — I48.19 PERSISTENT ATRIAL FIBRILLATION (HCC): Primary | ICD-10-CM

## 2024-07-31 PROCEDURE — 93000 ELECTROCARDIOGRAM COMPLETE: CPT | Performed by: INTERNAL MEDICINE

## 2024-07-31 PROCEDURE — 99214 OFFICE O/P EST MOD 30 MIN: CPT | Performed by: INTERNAL MEDICINE

## 2024-07-31 PROCEDURE — 3079F DIAST BP 80-89 MM HG: CPT | Performed by: INTERNAL MEDICINE

## 2024-07-31 PROCEDURE — 3075F SYST BP GE 130 - 139MM HG: CPT | Performed by: INTERNAL MEDICINE

## 2024-07-31 NOTE — PATIENT INSTRUCTIONS
ECG Watch Pro™ with AFib detection    Recommend smart watch to monitor you heart rhythm.     [email or web address]    Regular price$129.00 USD

## 2024-10-07 NOTE — TELEPHONE ENCOUNTER
He is actually much better  He is off the the O2 now for about a week  O2 is in 90's when he goes to the doctor and no O2  getting around better though at times some fatigue    The afib is still present  cardioversion did not do well   ablation is scheduled  He had a few nose bleeds  Call me back as needed    He will see ent doctor and will need referral Speech-Language Pathology    Outpatient Speech-Language Pathology Treatment     Patient Name: Berto Keane  MRN: [id number]  Today's Date: 10/7/2024     Time Calculation  Start Time: 1633  Stop Time: 1700  Time Calculation (min): 27 min      Current Problem:   1. Childhood apraxia of speech  Follow Up In Speech Therapy      2. Expressive language disorder  Follow Up In Speech Therapy          SLP Assessment:  SLP TX Intervention Outcome: Making Progress Towards Goals  SLP Assessment Results: Motor Speech Deficits  Prognosis: Good  Treatment Provided: Yes  Treatment Tolerance: Patient tolerated treatment well  Strengths: Family/Caregiver Support  Barriers: None  Education Provided: Yes       Plan:  Inpatient/Swing Bed or Outpatient: Outpatient  Treatment/Interventions: Articulation, Phonology  SLP TX Plan: Continue Plan of Care  SLP Plan: Skilled SLP  SLP Frequency: 1x per week  Duration: 12 weeks  Discussed POC: Caregiver/family  Discussed Risks/Benefits: Yes  Patient/Caregiver Agreeable: Yes      Subjective   Current Problem:  Berto was accompanied by their mother to today's appointment, who remained in the waiting area for the duration of the session. No concerns reported at this time.     Most Recent Visit:  SLP Received On: 10/07/24    General Visit Information:   Referred By: JULY Gonzales  Patient Seen During This Visit: Yes  Arrival: Family/caregiver present  Certification Period Start Date: 04/15/24  Certification Period End Date: 10/21/24  Number of Authorized Treatments : 52  Total Number of Visits : 33  POC Visits: 13/18   *PN 10/21/24    Pain Assessment:  Pain Assessment  Pain Assessment: Kirkland-Baker FACES  0-10 (Numeric) Pain Score: 0 - No pain      Objective   Speech Production Goals:  Long Term Goal(s):   In one year...  BERTO will exhibit age appropriate speech and language skills for functional communication in a variety of contexts.    Progress Note: 4/25/2024  Anticipated End: 4/25/2025  Goal  End:       Short Term Goal(s):   In twelve weeks...  Berto will produce /th/ in all positions of words in conversational speech with 75% accuracy independently, over 3 consecutive sessions.      PROGRESS:   sentence level - 59%   Goal Start: 7/25/2024  Anticipated End: 10/14/2024  Goal End:      BERTO will produce /r, l/ in all positions of single words with 50% accuracy independently, over 3 consecutive sessions.     PROGRESS:    /r/ initial position word level - 59%   Goal Revised: 7/25/2024  Anticipated End: 10/14/2024  Goal End:      Hold treatment on the following goals...  Berto will produce all sounds in consonant clusters in the initial position of words in sentences with 80% accuracy independently, over 3 consecutive sessions.    Goal Revised: 7/25/2024  Anticipated End: 10/14/2024  Goal End:          Berto will produce /v/ in all positions of single words with 80% accuracy independently,      over 3 consecutive sessions.    Goal Revised: 7/25/2024  Anticipated End: 10/14/2024  Goal End:           Ongoing caregiver education regarding goals, progress, and home programming.      Speech and Language Treatment:  Berto produced /r/ in the initial position of single words with 68% accuracy independently, increasing to 91% accuracy given moderate prompting.     Outpatient Education:  Peds Outpatient Education  Individual(s) Educated: Mother  Verbal Home Program:  (Progress during today's session)  Risk and Benefits Discussed with Patient/Caregiver/Other: yes  Patient/Caregiver Demonstrated Understanding: yes  Plan of Care Discussed and Agreed Upon: yes  Patient Response to Education: Patient/Caregiver Verbalized Understanding of Information  Education Comment: Progress during treatment session

## 2024-11-18 RX ORDER — SPIRONOLACTONE 25 MG/1
25 TABLET ORAL DAILY
Qty: 90 TABLET | Refills: 2 | Status: SHIPPED | OUTPATIENT
Start: 2024-11-18

## 2024-12-23 RX ORDER — POTASSIUM CHLORIDE 1500 MG/1
20 TABLET, EXTENDED RELEASE ORAL DAILY
Qty: 90 TABLET | Refills: 2 | Status: SHIPPED | OUTPATIENT
Start: 2024-12-23

## 2025-01-11 NOTE — ANESTHESIA PRE PROCEDURE
Department of Anesthesiology  Preprocedure Note       Name:  Candis Lou   Age:  65 y.o.  :  1960                                          MRN:  [id number]         Date:  2022      Surgeon: Igor Gonzalez):  Juanita Wagoner MD    Procedure: Procedure(s):  CYSTOSCOPY, LEFT URETEROSCOPY, HOLMIUM LASER, STONE MANIPULATION  LEFT STENT PLACEMENT    Medications prior to admission:   Prior to Admission medications    Medication Sig Start Date End Date Taking? Authorizing Provider   apixaban (ELIQUIS) 5 MG TABS tablet Take 5 mg by mouth 2 times daily    Historical Provider, MD   rivaroxaban (XARELTO) 20 MG TABS tablet Take 1 tablet by mouth Daily with supper 22   Roxanna Flores MD   digoxin [facility]) 125 MCG tablet Take 1 tablet by mouth daily 22   Sophia Kayser, APRN - CNP   [facility]) 0.4 MG capsule Take 1 capsule by mouth daily 1/11/22 2/10/22  Roxanna Stewart PA-C   dilTIAZem (CARDIZEM CD) 120 MG extended release capsule Take 2 capsules by mouth daily  Patient taking differently: Take 120 mg by mouth every 12 hours  21   Roxanna Flores MD       Current medications:    No current facility-administered medications for this visit. No current outpatient medications on file.      Facility-Administered Medications Ordered in Other Visits   Medication Dose Route Frequency Provider Last Rate Last Admin    sodium chloride flush 0.9 % injection 5-40 mL  5-40 mL IntraVENous 2 times per day Juanita Wagoner MD        sodium chloride flush 0.9 % injection 5-40 mL  5-40 mL IntraVENous PRN Juanita Wagoner MD        bisacodyl (DULCOLAX) EC tablet 5 mg  5 mg Oral Daily PRN Juanita Wagoner MD        apixaban (ELIQUIS) tablet 5 mg  5 mg Oral BID Juanita Wagoner MD   5 mg at 22    tamsulosin (FLOMAX) capsule 0.4 mg  0.4 mg Oral Daily Juanita Wagoner MD   0.4 mg at 22 003    dilTIAZem (CARDIZEM CD) extended release capsule 120 mg  120 mg Oral Q12H Afsaneh Dinh MD   120 mg at 01/20/22 2103    digoxin (LANOXIN) tablet 125 mcg  125 mcg Oral Daily Jasmin Gramajo MD        oxyCODONE-acetaminophen (PERCOCET) 5-325 MG per tablet 1 tablet  1 tablet Oral Q4H PRN Jasmin Gramajo MD        morphine (PF) injection 2 mg  2 mg IntraVENous Q2H PRN Jasmin Gramajo MD        potassium chloride (KLOR-CON M) extended release tablet 40 mEq  40 mEq Oral PRN Becca Flores MD        Or    potassium bicarb-citric acid (EFFER-K) effervescent tablet 40 mEq  40 mEq Oral PRN Becca Flores MD        Or    potassium chloride 10 mEq/100 mL IVPB (Peripheral Line)  10 mEq IntraVENous PRN Becca Flores  mL/hr at 01/21/22 0252 10 mEq at 01/21/22 0252       Allergies:     Allergies   Allergen Reactions    Amoxicillin Other (See Comments)     Groin area extremely red-\"on fire\"-skin peels off from neck to feet-over 2 weeks    Pcn [Penicillins] Other (See Comments)     Groin area extremely red and skin peels off from neck to feet over 2 weeks       Problem List:    Patient Active Problem List   Diagnosis Code    Hypokalemia E87.6    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    Atrial fibrillation with rapid ventricular response ([address] I48.91    Pneumonia due to COVID-19 virus U07.1, J12.82    Essential hypertension I10    Acute respiratory failure with hypoxia ([address] J96.01       Past Medical History:        Diagnosis Date    A-fib ([address]     Broken teeth     COVID-19     Hypertension     Kidney stones        Past Surgical History:        Procedure Laterality Date    CARDIOVERSION      x2    TONSILLECTOMY      as a child       Social History:    Social History     Tobacco Use    Smoking status: Never Smoker    Smokeless tobacco: Never Used   Substance Use Topics    Alcohol use: Yes     Comment: seldom                                Counseling given: Not Answered      Vital Signs Medicine NP Mami med (Current): There were no vitals filed for this visit. BP Readings from Last 3 Encounters:   01/21/22 133/82   01/17/22 138/86   01/12/22 118/78       NPO Status:  >8h                                                                               BMI:   Wt Readings from Last 3 Encounters:   01/21/22 207 lb 7.3 oz (94.1 kg)   01/17/22 207 lb (93.9 kg)   01/12/22 208 lb (94.3 kg)     There is no height or weight on file to calculate BMI.    CBC:   Lab Results   Component Value Date    WBC 6.3 01/18/2022    RBC 5.06 01/18/2022    HGB 14.2 01/18/2022    HCT 42.3 01/18/2022    MCV 83.5 01/18/2022    RDW 15.4 01/18/2022     01/18/2022       CMP:   Lab Results   Component Value Date     01/20/2022    K 2.8 01/20/2022    K 2.4 01/20/2022     01/20/2022    CO2 30 01/20/2022    BUN 10 01/20/2022    CREATININE 1.0 01/20/2022    GFRAA >60 01/20/2022    AGRATIO 1.4 03/18/2020    LABGLOM >60 01/20/2022    GLUCOSE 95 01/20/2022    PROT 5.4 11/22/2021    CALCIUM 8.7 01/20/2022    BILITOT 0.5 11/22/2021    ALKPHOS 65 11/22/2021    AST 20 11/22/2021    ALT 65 11/22/2021       POC Tests: No results for input(s): POCGLU, POCNA, POCK, POCCL, POCBUN, POCHEMO, POCHCT in the last 72 hours.     Coags:   Lab Results   Component Value Date    PROTIME 11.5 08/09/2015    INR 1.06 08/09/2015    APTT 25.4 08/09/2015       HCG (If Applicable): No results found for: PREGTESTUR, PREGSERUM, HCG, HCGQUANT     ABGs: No results found for: PHART, PO2ART, MPG1FAJ, BCD4AKM, BEART, T1RMTPES     Type & Screen (If Applicable):  No results found for: LABABO, LABRH    Drug/Infectious Status (If Applicable):  No results found for: HIV, HEPCAB    COVID-19 Screening (If Applicable): No results found for: COVID19        Anesthesia Evaluation   no history of anesthetic complications:   Airway: Mallampati: II  TM distance: >3 FB   Neck ROM: full  Mouth opening: > = 3 FB Dental:      Comment: Very poor Internal Medicine MICU dentition h/o broken teeth, numerous small chips, several teeth chipped at gumline - right lower premolar mildly loose    Pulmonary: breath sounds clear to auscultation      (-) COPD, asthma, rhonchi, rales and not a current smoker                           Cardiovascular:  Exercise tolerance: good (>4 METS),   (+) hypertension:, dysrhythmias (s/p cardioverison x 2; taking digoxin + cardizem): atrial fibrillation, peripheral edema (1+ chronic, equal bilaterally), hyperlipidemia          Rate: normal                    Neuro/Psych:      (-) seizures, TIA and CVA           GI/Hepatic/Renal:   (+) renal disease: kidney stones,      (-) liver disease      ROS comment: CT Abdomen/Pelvis:  1. 6 mm obstructing stone within the proximal 3rd of the left ureter causing mild left hydronephrosis. 2.  3 mm nonobstructing stone within the right kidney. 3.  Stable 1.5 cm left adrenal adenoma. 4.  Mild colonic diverticulosis without evidence of diverticulitis  . Endo/Other:    (+) blood dyscrasia (NOAC was not held): anticoagulation therapy:., electrolyte abnormalities (hypokalemia 1/18/22), .    (-) diabetes mellitus, hypothyroidism, hyperthyroidism               Abdominal:             Vascular: Other Findings:               Anesthesia Plan      general     ASA 3       Induction: intravenous. MIPS: Postoperative opioids intended and Prophylactic antiemetics administered. Anesthetic plan and risks discussed with patient. Plan discussed with CRNA. This pre-anesthesia assessment may be used as a history and physical.    DOS STAFF ADDENDUM:    Pt seen and examined, chart reviewed (including anesthesia, drug and allergy history). No interval changes to history and physical examination. Anesthetic plan, risks, benefits, alternatives, and personnel involved discussed with patient. Patient verbalized an understanding and agrees to proceed.       Henry Leong MD  January 21, 2022  6:38 AM medcine Dr. Carpenter Medicine Amada Eldridge NP Malena Guajardo Primary Team

## 2025-01-17 ENCOUNTER — OFFICE VISIT (OUTPATIENT)
Dept: FAMILY MEDICINE CLINIC | Age: 65
End: 2025-01-17

## 2025-01-17 VITALS
HEART RATE: 76 BPM | WEIGHT: 199.4 LBS | SYSTOLIC BLOOD PRESSURE: 134 MMHG | HEIGHT: 71 IN | DIASTOLIC BLOOD PRESSURE: 84 MMHG | TEMPERATURE: 97.7 F | BODY MASS INDEX: 27.92 KG/M2

## 2025-01-17 DIAGNOSIS — R79.89 ABNORMAL CBC: ICD-10-CM

## 2025-01-17 DIAGNOSIS — I10 ESSENTIAL HYPERTENSION: Primary | ICD-10-CM

## 2025-01-17 SDOH — ECONOMIC STABILITY: FOOD INSECURITY: WITHIN THE PAST 12 MONTHS, YOU WORRIED THAT YOUR FOOD WOULD RUN OUT BEFORE YOU GOT MONEY TO BUY MORE.: NEVER TRUE

## 2025-01-17 SDOH — ECONOMIC STABILITY: FOOD INSECURITY: WITHIN THE PAST 12 MONTHS, THE FOOD YOU BOUGHT JUST DIDN'T LAST AND YOU DIDN'T HAVE MONEY TO GET MORE.: NEVER TRUE

## 2025-01-17 ASSESSMENT — ENCOUNTER SYMPTOMS
ABDOMINAL PAIN: 0
CONSTIPATION: 0
SHORTNESS OF BREATH: 0
DIARRHEA: 0
BLOOD IN STOOL: 0
ROS SKIN COMMENTS: NO LESIONS

## 2025-01-17 ASSESSMENT — PATIENT HEALTH QUESTIONNAIRE - PHQ9
SUM OF ALL RESPONSES TO PHQ QUESTIONS 1-9: 0
2. FEELING DOWN, DEPRESSED OR HOPELESS: NOT AT ALL
1. LITTLE INTEREST OR PLEASURE IN DOING THINGS: NOT AT ALL
SUM OF ALL RESPONSES TO PHQ9 QUESTIONS 1 & 2: 0
SUM OF ALL RESPONSES TO PHQ QUESTIONS 1-9: 0

## 2025-01-17 NOTE — PATIENT INSTRUCTIONS
[email or web address] is an alternative   Continue the medicines   See in about 6 months       Hold enema

## 2025-01-17 NOTE — PROGRESS NOTES
Subjective:      Patient ID: Hoang Brandon is a 65 y.o. male.    Chief Complaint   Patient presents with    Check-Up     Hypertension, hypokalemia- patient denies any complaints at this time        Patient presents with:  Check-Up: Hypertension, hypokalemia- patient denies any complaints at this time    Here for the above   He is well   No c/o    Med same     YOB: 1960    Date of Visit:  1/17/2025     -- Amoxicillin -- Other (See Comments)    --  Groin area extremely red-\"on fire\"-skin peels off             from neck to feet-over 2 weeks   -- Pcn [Penicillins] -- Other (See Comments)    --  Groin area extremely red and skin peels off from             neck to feet over 2 weeks    Current Outpatient Medications:  potassium chloride (KLOR-CON M) 20 MEQ extended release tablet, TAKE 1 TABLET BY MOUTH DAILY, Disp: 90 tablet, Rfl: 2  spironolactone (ALDACTONE) 25 MG tablet, TAKE 1 TABLET BY MOUTH DAILY, Disp: 90 tablet, Rfl: 2  magnesium oxide (MAG-OX) 400 (240 Mg) MG tablet, TAKE 1/2 TABLET BY MOUTH DAILY, Disp: 15 tablet, Rfl: 4    No current facility-administered medications for this visit.      ---------------------------------                  01/17/25                            1444            ---------------------------------   BP:             134/84            Site:       Left Upper Arm        Position:        Sitting           Cuff Size:     Medium Adult         Pulse:            76              Temp:      97.7 °F (36.5 °C)      TempSrc:       Temporal           Weight: 90.4 kg (199 lb 6.4 oz)   Height:    1.803 m (5' 11\")      ---------------------------------  Body mass index is 27.81 kg/m².     Wt Readings from Last 3 Encounters:  01/17/25 : 90.4 kg (199 lb 6.4 oz)  07/31/24 : 86.2 kg (190 lb)  07/18/24 : 87.1 kg (192 lb)    BP Readings from Last 3 Encounters:  01/17/25 : 134/84  07/31/24 : 134/88  07/18/24 : 128/80            Review of Systems

## 2025-01-18 LAB
ANISOCYTOSIS BLD QL SMEAR: ABNORMAL
BASOPHILS # BLD: 0.2 K/UL (ref 0–0.2)
BASOPHILS NFR BLD: 3.3 %
BURR CELLS BLD QL SMEAR: ABNORMAL
DEPRECATED RDW RBC AUTO: 14.8 % (ref 12.4–15.4)
EOSINOPHIL # BLD: 0.1 K/UL (ref 0–0.6)
EOSINOPHIL NFR BLD: 2.1 %
HCT VFR BLD AUTO: 42.4 % (ref 40.5–52.5)
HGB BLD-MCNC: 14.2 G/DL (ref 13.5–17.5)
LYMPHOCYTES # BLD: 1.3 K/UL (ref 1–5.1)
LYMPHOCYTES NFR BLD: 26.5 %
MCH RBC QN AUTO: 28.6 PG (ref 26–34)
MCHC RBC AUTO-ENTMCNC: 33.5 G/DL (ref 31–36)
MCV RBC AUTO: 85.6 FL (ref 80–100)
MONOCYTES # BLD: 0.6 K/UL (ref 0–1.3)
MONOCYTES NFR BLD: 12.1 %
NEUTROPHILS # BLD: 2.7 K/UL (ref 1.7–7.7)
NEUTROPHILS NFR BLD: 56 %
OVALOCYTES BLD QL SMEAR: ABNORMAL
PLATELET # BLD AUTO: 202 K/UL (ref 135–450)
PLATELET BLD QL SMEAR: ADEQUATE
PMV BLD AUTO: 10.3 FL (ref 5–10.5)
POIKILOCYTOSIS BLD QL SMEAR: ABNORMAL
POLYCHROMASIA BLD QL SMEAR: ABNORMAL
RBC # BLD AUTO: 4.95 M/UL (ref 4.2–5.9)
SLIDE REVIEW: ABNORMAL
WBC # BLD AUTO: 4.9 K/UL (ref 4–11)

## 2025-04-29 ENCOUNTER — APPOINTMENT (OUTPATIENT)
Dept: CT IMAGING | Age: 65
End: 2025-04-29

## 2025-04-29 ENCOUNTER — HOSPITAL ENCOUNTER (OUTPATIENT)
Age: 65
Setting detail: OBSERVATION
Discharge: HOME | End: 2025-04-30
Attending: EMERGENCY MEDICINE

## 2025-04-29 DIAGNOSIS — I63.9 CEREBROVASCULAR ACCIDENT (CVA), UNSPECIFIED MECHANISM (HCC): ICD-10-CM

## 2025-04-29 DIAGNOSIS — H53.8 BLURRED VISION, RIGHT EYE: Primary | ICD-10-CM

## 2025-04-29 LAB
ALBUMIN SERPL-MCNC: 3.9 G/DL (ref 3.4–5)
ALBUMIN/GLOB SERPL: 1.4 {RATIO} (ref 1.1–2.2)
ALP SERPL-CCNC: 97 U/L (ref 40–129)
ALT SERPL-CCNC: 29 U/L (ref 10–40)
ANION GAP SERPL CALCULATED.3IONS-SCNC: 9 MMOL/L (ref 3–16)
APTT BLD: 28.9 SEC (ref 22.1–36.4)
AST SERPL-CCNC: 31 U/L (ref 15–37)
BASOPHILS # BLD: 0.1 K/UL (ref 0–0.2)
BASOPHILS NFR BLD: 1.2 %
BILIRUB SERPL-MCNC: 0.4 MG/DL (ref 0–1)
BUN SERPL-MCNC: 17 MG/DL (ref 7–20)
CALCIUM SERPL-MCNC: 8.9 MG/DL (ref 8.3–10.6)
CHLORIDE SERPL-SCNC: 104 MMOL/L (ref 99–110)
CO2 SERPL-SCNC: 27 MMOL/L (ref 21–32)
CREAT SERPL-MCNC: 1 MG/DL (ref 0.8–1.3)
DEPRECATED RDW RBC AUTO: 14.6 % (ref 12.4–15.4)
EOSINOPHIL # BLD: 0.1 K/UL (ref 0–0.6)
EOSINOPHIL NFR BLD: 2.3 %
GFR SERPLBLD CREATININE-BSD FMLA CKD-EPI: 84 ML/MIN/{1.73_M2}
GLUCOSE BLD-MCNC: 88 MG/DL
GLUCOSE BLD-MCNC: 88 MG/DL (ref 70–99)
GLUCOSE SERPL-MCNC: 91 MG/DL (ref 70–99)
HCT VFR BLD AUTO: 41.1 % (ref 40.5–52.5)
HGB BLD-MCNC: 14.2 G/DL (ref 13.5–17.5)
INR PPP: 1.09 (ref 0.85–1.15)
LYMPHOCYTES # BLD: 1.5 K/UL (ref 1–5.1)
LYMPHOCYTES NFR BLD: 26.2 %
MCH RBC QN AUTO: 28.5 PG (ref 26–34)
MCHC RBC AUTO-ENTMCNC: 34.5 G/DL (ref 31–36)
MCV RBC AUTO: 82.5 FL (ref 80–100)
MONOCYTES # BLD: 0.6 K/UL (ref 0–1.3)
MONOCYTES NFR BLD: 11 %
NEUTROPHILS # BLD: 3.4 K/UL (ref 1.7–7.7)
NEUTROPHILS NFR BLD: 59.3 %
PERFORMED ON: NORMAL
PLATELET # BLD AUTO: 170 K/UL (ref 135–450)
PMV BLD AUTO: 9.9 FL (ref 5–10.5)
POTASSIUM SERPL-SCNC: 3.6 MMOL/L (ref 3.5–5.1)
PROT SERPL-MCNC: 6.6 G/DL (ref 6.4–8.2)
PROTHROMBIN TIME: 14.3 SEC (ref 11.9–14.9)
RBC # BLD AUTO: 4.98 M/UL (ref 4.2–5.9)
SODIUM SERPL-SCNC: 140 MMOL/L (ref 136–145)
TROPONIN, HIGH SENSITIVITY: 8 NG/L (ref 0–22)
WBC # BLD AUTO: 5.7 K/UL (ref 4–11)

## 2025-04-29 PROCEDURE — 84484 ASSAY OF TROPONIN QUANT: CPT

## 2025-04-29 PROCEDURE — 2500000003 HC RX 250 WO HCPCS

## 2025-04-29 PROCEDURE — 80053 COMPREHEN METABOLIC PANEL: CPT

## 2025-04-29 PROCEDURE — 6360000002 HC RX W HCPCS

## 2025-04-29 PROCEDURE — 85610 PROTHROMBIN TIME: CPT

## 2025-04-29 PROCEDURE — 85730 THROMBOPLASTIN TIME PARTIAL: CPT

## 2025-04-29 PROCEDURE — 99285 EMERGENCY DEPT VISIT HI MDM: CPT

## 2025-04-29 PROCEDURE — 6360000004 HC RX CONTRAST MEDICATION: Performed by: PHYSICIAN ASSISTANT

## 2025-04-29 PROCEDURE — 36415 COLL VENOUS BLD VENIPUNCTURE: CPT

## 2025-04-29 PROCEDURE — 70450 CT HEAD/BRAIN W/O DYE: CPT

## 2025-04-29 PROCEDURE — 6370000000 HC RX 637 (ALT 250 FOR IP)

## 2025-04-29 PROCEDURE — 96372 THER/PROPH/DIAG INJ SC/IM: CPT

## 2025-04-29 PROCEDURE — G0378 HOSPITAL OBSERVATION PER HR: HCPCS

## 2025-04-29 PROCEDURE — 85025 COMPLETE CBC W/AUTO DIFF WBC: CPT

## 2025-04-29 PROCEDURE — 70498 CT ANGIOGRAPHY NECK: CPT

## 2025-04-29 RX ORDER — POTASSIUM CHLORIDE 1500 MG/1
20 TABLET, EXTENDED RELEASE ORAL DAILY
Status: DISCONTINUED | OUTPATIENT
Start: 2025-04-30 | End: 2025-04-30 | Stop reason: HOSPADM

## 2025-04-29 RX ORDER — SODIUM CHLORIDE 0.9 % (FLUSH) 0.9 %
5-40 SYRINGE (ML) INJECTION PRN
Status: DISCONTINUED | OUTPATIENT
Start: 2025-04-29 | End: 2025-04-30 | Stop reason: HOSPADM

## 2025-04-29 RX ORDER — ASPIRIN 81 MG/1
81 TABLET, CHEWABLE ORAL DAILY
Status: DISCONTINUED | OUTPATIENT
Start: 2025-04-29 | End: 2025-04-30 | Stop reason: HOSPADM

## 2025-04-29 RX ORDER — SPIRONOLACTONE 25 MG/1
25 TABLET ORAL DAILY
Status: DISCONTINUED | OUTPATIENT
Start: 2025-04-30 | End: 2025-04-30 | Stop reason: HOSPADM

## 2025-04-29 RX ORDER — SODIUM CHLORIDE 9 MG/ML
INJECTION, SOLUTION INTRAVENOUS PRN
Status: DISCONTINUED | OUTPATIENT
Start: 2025-04-29 | End: 2025-04-30 | Stop reason: HOSPADM

## 2025-04-29 RX ORDER — ENOXAPARIN SODIUM 100 MG/ML
40 INJECTION SUBCUTANEOUS NIGHTLY
Status: DISCONTINUED | OUTPATIENT
Start: 2025-04-29 | End: 2025-04-30 | Stop reason: HOSPADM

## 2025-04-29 RX ORDER — ONDANSETRON 2 MG/ML
4 INJECTION INTRAMUSCULAR; INTRAVENOUS EVERY 6 HOURS PRN
Status: DISCONTINUED | OUTPATIENT
Start: 2025-04-29 | End: 2025-04-30 | Stop reason: HOSPADM

## 2025-04-29 RX ORDER — IOPAMIDOL 755 MG/ML
75 INJECTION, SOLUTION INTRAVASCULAR
Status: COMPLETED | OUTPATIENT
Start: 2025-04-29 | End: 2025-04-29

## 2025-04-29 RX ORDER — SODIUM CHLORIDE 0.9 % (FLUSH) 0.9 %
5-40 SYRINGE (ML) INJECTION EVERY 12 HOURS SCHEDULED
Status: DISCONTINUED | OUTPATIENT
Start: 2025-04-29 | End: 2025-04-30 | Stop reason: HOSPADM

## 2025-04-29 RX ORDER — ONDANSETRON 4 MG/1
4 TABLET, ORALLY DISINTEGRATING ORAL EVERY 8 HOURS PRN
Status: DISCONTINUED | OUTPATIENT
Start: 2025-04-29 | End: 2025-04-30 | Stop reason: HOSPADM

## 2025-04-29 RX ORDER — LANOLIN ALCOHOL/MO/W.PET/CERES
200 CREAM (GRAM) TOPICAL EVERY EVENING
Status: DISCONTINUED | OUTPATIENT
Start: 2025-04-29 | End: 2025-04-30 | Stop reason: HOSPADM

## 2025-04-29 RX ORDER — POLYETHYLENE GLYCOL 3350 17 G/17G
17 POWDER, FOR SOLUTION ORAL DAILY PRN
Status: DISCONTINUED | OUTPATIENT
Start: 2025-04-29 | End: 2025-04-30 | Stop reason: HOSPADM

## 2025-04-29 RX ORDER — ASPIRIN 300 MG/1
300 SUPPOSITORY RECTAL DAILY
Status: DISCONTINUED | OUTPATIENT
Start: 2025-04-29 | End: 2025-04-30 | Stop reason: HOSPADM

## 2025-04-29 RX ADMIN — IOPAMIDOL 75 ML: 755 INJECTION, SOLUTION INTRAVENOUS at 16:49

## 2025-04-29 RX ADMIN — ASPIRIN 81 MG: 81 TABLET, CHEWABLE ORAL at 21:19

## 2025-04-29 RX ADMIN — ENOXAPARIN SODIUM 40 MG: 100 INJECTION SUBCUTANEOUS at 21:19

## 2025-04-29 RX ADMIN — SODIUM CHLORIDE, PRESERVATIVE FREE 10 ML: 5 INJECTION INTRAVENOUS at 22:41

## 2025-04-29 RX ADMIN — Medication 200 MG: at 21:19

## 2025-04-29 ASSESSMENT — PAIN SCALES - GENERAL: PAINLEVEL_OUTOF10: 0

## 2025-04-29 ASSESSMENT — PAIN - FUNCTIONAL ASSESSMENT: PAIN_FUNCTIONAL_ASSESSMENT: 0-10

## 2025-04-29 NOTE — ED TRIAGE NOTES
Pt presents to ED for a sudden onset of a visual disturbance in his right eye that began at 1030 this morning. FAST-0.     Pt taken to room 38.

## 2025-04-29 NOTE — ED PROVIDER NOTES
Emergency Department Attending Provider Note  Location: [address] MED SURG  4/29/2025   Note Started: 4:42 PM EDT 4/29/25     THIS IS MY GILA SUPERVISORY AND SHARED VISIT NOTE:    Patient Identification  Hoang Brandon is a 65 y.o. male      HPI:Hoang Brandon was evaluated in the Emergency Department for blurry vision in the right peripheral vision has been present since this morning at 10:30 AM.  Patient states that he felt that the blurry vision was only in the right eye but then noticed that he also had some blurry vision on the right peripheral vision when he closed his right eye as well.  Patient denies any painful eye movements or pain in the right eye.  He denies any headache.  Denies any difficulty speaking.  Denies any lightheadedness or dizziness.  No numbness or weakness in extremities.  No history of stroke or TIA.  No chest pain or shortness of breath.  No nausea or vomiting.  No fevers or chills..   Although initial history and physical exam information was obtained by GILA/NPP (who also dictated a record of this visit), I personally saw the patient and made/approved the management plan and take responsibility for the patient management.       PHYSICAL EXAM:     CONSTITUTIONAL: AOx4,, cooperative with exam, afebrile   HEAD: normocephalic, atraumatic   EYES: PERRL, EOMI, anicteric sclera, patient endorses scotoma like visual deficit in the right peripheral vision in both of the eyes   ENT: Moist mucous membranes, uvula midline   NECK: Supple, symmetric, trachea midline   BACK: Symmetric, no deformity   LUNGS: Bilateral breath sounds, CTAB, no rales/ronchi/wheezes   CARDIOVASCULAR: RRR, normal S1/S2, no m/r/g, 2+ pulses throughout   ABDOMEN: Soft, non-tender, non-distended, +BS   NEUROLOGIC:  MAEx4, 5/5 strength throughout; fine touch sensation intact throughout; normal gait; finger-to-nose testing normal; no pronator drift, normal heel-to-shin, no dysarthria, no aphasia, no facial droop, negative test of

## 2025-04-29 NOTE — H&P
V2.0  History and Physical      Name:  Hoang Brandon /Age/Sex: 1960  (65 y.o. male)   MRN & CSN:  [id number] & [id number] Encounter Date/Time: 2025 7:52 PM EDT   Location:  [address] [place] [address] PCP: Jesus Hargrove MD       Hospital Day: 1    Assessment and Plan:   Hoang Brandon is a 65 y.o. 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   # Rule out cerebral vascular accident vs Transient ischemic accident  Patient has a history of hypertension  -Vital signs  Blood pressure 186/99, heart rate 75 on room air saturating well.  - Lab within normal limit blood sugar 91, normal hemoglobin.  Troponin x 1 negative.   - CT of head without contrast no acute intracranial abnormality. White matter disease described is typical of microvascular ischemic disease or as sequela of dysmyelinating/demyelinating processes.   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  - ED consulted stroke team, patient is outside the acute treatment for TNK  - Will obtain MRI brain,  - Will obtain lipid panel, hemoglobin A1c  -Started on statin, aspirin  - ischemic etiology suspected, allow for permissive hypertension (SBP < 220 and/or DBP <120), hold home hypertensive medication  - PT, speech therapy consulted  - Neurology consulted  - Recheck labs in a.m.    DVT Prophylaxis: Lovenox  Diet: Regular diet    Advance care planning:  Advance care planning the patient for total of 16 minutes.  Full code, DNR CC, DNR CCA, power of  and living will were discussed.  Patient elected to be full code    Disposition:   Current Living situation: At home independent  Expected Disposition: Home  Estimated D/C: 3

## 2025-04-29 NOTE — ED PROVIDER NOTES
[facility] EMERGENCY DEPARTMENT  EMERGENCY DEPARTMENT ENCOUNTER      Pt Name: Hoang Brandon  MRN:[id number]  Birthdate 1960  Date of evaluation: 4/29/2025  Provider: Wiliam Gorman PA-C  Note Started: 7:36 PM EDT 4/29/25    This patient was seen evaluated by my attending Dr. Luke Carbajal MD        Chief Complaint:    Chief Complaint   Patient presents with    Blurred Vision    Hypertension         Nursing Notes, Past Medical Hx, Past Surgical Hx, Social Hx, Allergies, and Family Hx were all reviewed and agreed with or any disagreements were addressed in the HPI.    HPI: (Location, Duration, Timing, Severity, Quality, Assoc Sx, Context, Modifying factors)    History From: Patient  Limitations to history : None    Social Determinants Significantly Affecting Health : None    Chief Complaint of right eye blurriness.  Patient complain of cloudiness that just came over his right eye around 1030 this morning.  He states he was not doing anything he did not have any headaches.  No cough or congestion.  He denies any slurred speech, no weakness of extremities.  No injury to the eye.  He also states and some floaters.  He has no previous history of strokes.  Says he has a history of A-fib.  He had cardioversion x 2.  History of hypertension.  Denies any sensitivity to light.    This is a  65 y.o. male who presents to the emergency room with the above complaint.    PastMedical/Surgical History:      Diagnosis Date    A-fib (HCC)     Broken teeth     COVID-19     Hypertension     Kidney stones          Procedure Laterality Date    CARDIOVERSION      x2    CYSTOSCOPY Left 1/21/2022    CYSTOSCOPY, LEFT URETEROSCOPY, HOLMIUM LASER, STONE MANIPULATION performed by Afsaneh Dinh MD at [facility] OR    CYSTOSCOPY Left 1/21/2022    LEFT STENT PLACEMENT performed by Afsaneh Dinh MD at [facility] OR    TONSILLECTOMY      as a child       Medications:  Previous Medications    MAGNESIUM OXIDE (MAG-OX)

## 2025-04-30 ENCOUNTER — APPOINTMENT (OUTPATIENT)
Age: 65
End: 2025-04-30
Attending: HOSPITALIST

## 2025-04-30 ENCOUNTER — APPOINTMENT (OUTPATIENT)
Dept: MRI IMAGING | Age: 65
End: 2025-04-30

## 2025-04-30 VITALS
HEIGHT: 71 IN | BODY MASS INDEX: 26.46 KG/M2 | HEART RATE: 96 BPM | OXYGEN SATURATION: 99 % | WEIGHT: 189 LBS | TEMPERATURE: 98.6 F | DIASTOLIC BLOOD PRESSURE: 94 MMHG | RESPIRATION RATE: 16 BRPM | SYSTOLIC BLOOD PRESSURE: 159 MMHG

## 2025-04-30 LAB
ANION GAP SERPL CALCULATED.3IONS-SCNC: 8 MMOL/L (ref 3–16)
BUN SERPL-MCNC: 15 MG/DL (ref 7–20)
CALCIUM SERPL-MCNC: 8.3 MG/DL (ref 8.3–10.6)
CHLORIDE SERPL-SCNC: 106 MMOL/L (ref 99–110)
CHOLEST SERPL-MCNC: 108 MG/DL (ref 0–199)
CO2 SERPL-SCNC: 27 MMOL/L (ref 21–32)
CREAT SERPL-MCNC: 0.8 MG/DL (ref 0.8–1.3)
DEPRECATED RDW RBC AUTO: 14.6 % (ref 12.4–15.4)
ECHO AO ROOT DIAM: 3.4 CM
ECHO AO ROOT INDEX: 1.65 CM/M2
ECHO AV AREA PEAK VELOCITY: 2.7 CM2
ECHO AV AREA VTI: 3 CM2
ECHO AV AREA/BSA PEAK VELOCITY: 1.3 CM2/M2
ECHO AV AREA/BSA VTI: 1.5 CM2/M2
ECHO AV CUSP MM: 2.3 CM
ECHO AV MEAN GRADIENT: 4 MMHG
ECHO AV MEAN VELOCITY: 0.9 M/S
ECHO AV PEAK GRADIENT: 6 MMHG
ECHO AV PEAK VELOCITY: 1.3 M/S
ECHO AV VELOCITY RATIO: 0.62
ECHO AV VTI: 24.3 CM
ECHO BSA: 2.07 M2
ECHO EST RA PRESSURE: 3 MMHG
ECHO LA AREA 2C: 18.5 CM2
ECHO LA AREA 4C: 23.7 CM2
ECHO LA DIAMETER INDEX: 2.04 CM/M2
ECHO LA DIAMETER: 4.2 CM
ECHO LA MAJOR AXIS: 6.1 CM
ECHO LA MINOR AXIS: 5.9 CM
ECHO LA TO AORTIC ROOT RATIO: 1.24
ECHO LA VOL BP: 62 ML (ref 18–58)
ECHO LA VOL MOD A2C: 49 ML (ref 18–58)
ECHO LA VOL MOD A4C: 78 ML (ref 18–58)
ECHO LA VOL/BSA BIPLANE: 30 ML/M2 (ref 16–34)
ECHO LA VOLUME INDEX MOD A2C: 24 ML/M2 (ref 16–34)
ECHO LA VOLUME INDEX MOD A4C: 38 ML/M2 (ref 16–34)
ECHO LV E' LATERAL VELOCITY: 8.27 CM/S
ECHO LV E' SEPTAL VELOCITY: 9.81 CM/S
ECHO LV EDV 3D: 175 ML
ECHO LV EDV A4C: 141 ML
ECHO LV EDV INDEX 3D: 85 ML/M2
ECHO LV EDV INDEX A4C: 68 ML/M2
ECHO LV EF PHYSICIAN: 56 %
ECHO LV EJECTION FRACTION 3D: 53 %
ECHO LV EJECTION FRACTION A4C: 51 %
ECHO LV ESV 3D: 83 ML
ECHO LV ESV A4C: 69 ML
ECHO LV ESV INDEX 3D: 40 ML/M2
ECHO LV ESV INDEX A4C: 33 ML/M2
ECHO LV FRACTIONAL SHORTENING: 33 % (ref 28–44)
ECHO LV INTERNAL DIMENSION DIASTOLE INDEX: 2.67 CM/M2
ECHO LV INTERNAL DIMENSION DIASTOLIC: 5.5 CM (ref 4.2–5.9)
ECHO LV INTERNAL DIMENSION SYSTOLIC INDEX: 1.8 CM/M2
ECHO LV INTERNAL DIMENSION SYSTOLIC: 3.7 CM
ECHO LV IVSD: 1 CM (ref 0.6–1)
ECHO LV MASS 2D: 227.4 G (ref 88–224)
ECHO LV MASS 3D INDEX: 104.4 G/M2
ECHO LV MASS 3D: 215 G
ECHO LV MASS INDEX 2D: 110.4 G/M2 (ref 49–115)
ECHO LV POSTERIOR WALL DIASTOLIC: 1.1 CM (ref 0.6–1)
ECHO LV RELATIVE WALL THICKNESS RATIO: 0.4
ECHO LVOT AREA: 4.2 CM2
ECHO LVOT AV VTI INDEX: 0.71
ECHO LVOT DIAM: 2.3 CM
ECHO LVOT MEAN GRADIENT: 2 MMHG
ECHO LVOT PEAK GRADIENT: 3 MMHG
ECHO LVOT PEAK VELOCITY: 0.8 M/S
ECHO LVOT STROKE VOLUME INDEX: 34.9 ML/M2
ECHO LVOT SV: 71.8 ML
ECHO LVOT VTI: 17.3 CM
ECHO MV A VELOCITY: 0.42 M/S
ECHO MV AREA VTI: 3.2 CM2
ECHO MV E DECELERATION TIME (DT): 182 MS
ECHO MV E VELOCITY: 0.78 M/S
ECHO MV E/A RATIO: 1.86
ECHO MV E/E' LATERAL: 9.43
ECHO MV E/E' RATIO (AVERAGED): 8.69
ECHO MV E/E' SEPTAL: 7.95
ECHO MV LVOT VTI INDEX: 1.3
ECHO MV MAX VELOCITY: 1 M/S
ECHO MV MEAN GRADIENT: 2 MMHG
ECHO MV MEAN VELOCITY: 0.6 M/S
ECHO MV PEAK GRADIENT: 4 MMHG
ECHO MV REGURGITANT PEAK GRADIENT: 104 MMHG
ECHO MV REGURGITANT PEAK VELOCITY: 5.1 M/S
ECHO MV VTI: 22.5 CM
ECHO PV ACCELERATION TIME (AT): 51 MS
ECHO PV MAX VELOCITY: 1 M/S
ECHO PV PEAK GRADIENT: 4 MMHG
ECHO RA AREA 4C: 19.2 CM2
ECHO RA END SYSTOLIC VOLUME APICAL 4 CHAMBER INDEX BSA: 21 ML/M2
ECHO RA VOLUME: 44 ML
ECHO RV FREE WALL PEAK S': 22.1 CM/S
ECHO RV INTERNAL DIMENSION: 3 CM
ECHO RV TAPSE: 3.6 CM (ref 1.7–?)
ECHO TV E WAVE: 0.4 M/S
ECHO TV PEAK GRADIENT: 2 MMHG
EST. AVERAGE GLUCOSE BLD GHB EST-MCNC: 93.9 MG/DL
GFR SERPLBLD CREATININE-BSD FMLA CKD-EPI: >90 ML/MIN/{1.73_M2}
GLUCOSE SERPL-MCNC: 92 MG/DL (ref 70–99)
HBA1C MFR BLD: 4.9 %
HCT VFR BLD AUTO: 41.3 % (ref 40.5–52.5)
HDLC SERPL-MCNC: 24 MG/DL (ref 40–60)
HGB BLD-MCNC: 14.3 G/DL (ref 13.5–17.5)
LDLC SERPL CALC-MCNC: 69 MG/DL
MAGNESIUM SERPL-MCNC: 2.2 MG/DL (ref 1.8–2.4)
MCH RBC QN AUTO: 28.6 PG (ref 26–34)
MCHC RBC AUTO-ENTMCNC: 34.6 G/DL (ref 31–36)
MCV RBC AUTO: 82.6 FL (ref 80–100)
PLATELET # BLD AUTO: 174 K/UL (ref 135–450)
PMV BLD AUTO: 10.3 FL (ref 5–10.5)
POTASSIUM SERPL-SCNC: 2.8 MMOL/L (ref 3.5–5.1)
POTASSIUM SERPL-SCNC: 3.7 MMOL/L (ref 3.5–5.1)
RBC # BLD AUTO: 4.99 M/UL (ref 4.2–5.9)
SODIUM SERPL-SCNC: 141 MMOL/L (ref 136–145)
TRIGL SERPL-MCNC: 77 MG/DL (ref 0–150)
VLDLC SERPL CALC-MCNC: 15 MG/DL
WBC # BLD AUTO: 4.7 K/UL (ref 4–11)

## 2025-04-30 PROCEDURE — 96376 TX/PRO/DX INJ SAME DRUG ADON: CPT

## 2025-04-30 PROCEDURE — 97110 THERAPEUTIC EXERCISES: CPT

## 2025-04-30 PROCEDURE — 6360000002 HC RX W HCPCS: Performed by: HOSPITALIST

## 2025-04-30 PROCEDURE — 83735 ASSAY OF MAGNESIUM: CPT

## 2025-04-30 PROCEDURE — 84132 ASSAY OF SERUM POTASSIUM: CPT

## 2025-04-30 PROCEDURE — 6370000000 HC RX 637 (ALT 250 FOR IP)

## 2025-04-30 PROCEDURE — 97165 OT EVAL LOW COMPLEX 30 MIN: CPT

## 2025-04-30 PROCEDURE — 93306 TTE W/DOPPLER COMPLETE: CPT

## 2025-04-30 PROCEDURE — 83036 HEMOGLOBIN GLYCOSYLATED A1C: CPT

## 2025-04-30 PROCEDURE — 85027 COMPLETE CBC AUTOMATED: CPT

## 2025-04-30 PROCEDURE — 2500000003 HC RX 250 WO HCPCS

## 2025-04-30 PROCEDURE — 96374 THER/PROPH/DIAG INJ IV PUSH: CPT

## 2025-04-30 PROCEDURE — 97161 PT EVAL LOW COMPLEX 20 MIN: CPT

## 2025-04-30 PROCEDURE — 94760 N-INVAS EAR/PLS OXIMETRY 1: CPT

## 2025-04-30 PROCEDURE — G0378 HOSPITAL OBSERVATION PER HR: HCPCS

## 2025-04-30 PROCEDURE — 80061 LIPID PANEL: CPT

## 2025-04-30 PROCEDURE — 80048 BASIC METABOLIC PNL TOTAL CA: CPT

## 2025-04-30 PROCEDURE — 36415 COLL VENOUS BLD VENIPUNCTURE: CPT

## 2025-04-30 PROCEDURE — 70551 MRI BRAIN STEM W/O DYE: CPT

## 2025-04-30 PROCEDURE — 96375 TX/PRO/DX INJ NEW DRUG ADDON: CPT

## 2025-04-30 PROCEDURE — 6370000000 HC RX 637 (ALT 250 FOR IP): Performed by: HOSPITALIST

## 2025-04-30 RX ORDER — ATORVASTATIN CALCIUM 40 MG/1
40 TABLET, FILM COATED ORAL DAILY
Qty: 30 TABLET | Refills: 0 | Status: SHIPPED | OUTPATIENT
Start: 2025-04-30

## 2025-04-30 RX ORDER — HYDRALAZINE HYDROCHLORIDE 20 MG/ML
10 INJECTION INTRAMUSCULAR; INTRAVENOUS EVERY 4 HOURS PRN
Status: DISCONTINUED | OUTPATIENT
Start: 2025-04-30 | End: 2025-04-30 | Stop reason: SDUPTHER

## 2025-04-30 RX ORDER — AMLODIPINE BESYLATE 5 MG/1
5 TABLET ORAL ONCE
Status: COMPLETED | OUTPATIENT
Start: 2025-04-30 | End: 2025-04-30

## 2025-04-30 RX ORDER — ASPIRIN 81 MG/1
81 TABLET, CHEWABLE ORAL DAILY
Qty: 30 TABLET | Refills: 3 | Status: SHIPPED | OUTPATIENT
Start: 2025-05-01

## 2025-04-30 RX ORDER — LABETALOL HYDROCHLORIDE 5 MG/ML
20 INJECTION, SOLUTION INTRAVENOUS ONCE
Status: COMPLETED | OUTPATIENT
Start: 2025-04-30 | End: 2025-04-30

## 2025-04-30 RX ORDER — HYDRALAZINE HYDROCHLORIDE 20 MG/ML
10 INJECTION INTRAMUSCULAR; INTRAVENOUS EVERY 4 HOURS PRN
Status: DISCONTINUED | OUTPATIENT
Start: 2025-04-30 | End: 2025-04-30 | Stop reason: HOSPADM

## 2025-04-30 RX ORDER — AMLODIPINE BESYLATE 5 MG/1
10 TABLET ORAL DAILY
Qty: 180 TABLET | Refills: 0 | Status: SHIPPED | OUTPATIENT
Start: 2025-04-30

## 2025-04-30 RX ADMIN — AMLODIPINE BESYLATE 5 MG: 5 TABLET ORAL at 17:23

## 2025-04-30 RX ADMIN — POTASSIUM BICARBONATE 40 MEQ: 782 TABLET, EFFERVESCENT ORAL at 06:36

## 2025-04-30 RX ADMIN — LABETALOL HYDROCHLORIDE 20 MG: 5 INJECTION, SOLUTION INTRAVENOUS at 18:44

## 2025-04-30 RX ADMIN — POTASSIUM CHLORIDE 20 MEQ: 1500 TABLET, EXTENDED RELEASE ORAL at 07:42

## 2025-04-30 RX ADMIN — HYDRALAZINE HYDROCHLORIDE 10 MG: 20 INJECTION INTRAMUSCULAR; INTRAVENOUS at 17:23

## 2025-04-30 RX ADMIN — Medication 200 MG: at 17:23

## 2025-04-30 RX ADMIN — ASPIRIN 81 MG: 81 TABLET, CHEWABLE ORAL at 07:42

## 2025-04-30 RX ADMIN — SODIUM CHLORIDE, PRESERVATIVE FREE 10 ML: 5 INJECTION INTRAVENOUS at 07:42

## 2025-04-30 RX ADMIN — SPIRONOLACTONE 25 MG: 25 TABLET ORAL at 07:42

## 2025-04-30 RX ADMIN — HYDRALAZINE HYDROCHLORIDE 10 MG: 20 INJECTION INTRAMUSCULAR; INTRAVENOUS at 12:44

## 2025-04-30 ASSESSMENT — ENCOUNTER SYMPTOMS
PHOTOPHOBIA: 0
NAUSEA: 0
EYE PAIN: 0
BACK PAIN: 0
SHORTNESS OF BREATH: 0
EYE REDNESS: 0
COUGH: 0
EYE ITCHING: 0
SORE THROAT: 0
EYE DISCHARGE: 0
ABDOMINAL PAIN: 0
VOMITING: 0

## 2025-04-30 ASSESSMENT — PAIN SCALES - GENERAL
PAINLEVEL_OUTOF10: 0

## 2025-04-30 ASSESSMENT — VISUAL ACUITY: OU: 1

## 2025-04-30 NOTE — PLAN OF CARE
Problem: Pain  Goal: Verbalizes/displays adequate comfort level or baseline comfort level  4/30/2025 1011 by Bette Fleming, RN  Outcome: Progressing     Problem: ABCDS Injury Assessment  Goal: Absence of physical injury  4/30/2025 1011 by Bette Fleming, RN  Outcome: Progressing     Problem: Discharge Planning  Goal: Discharge to home or other facility with appropriate resources  4/30/2025 1011 by Bette Fleming, RN  Outcome: Progressing

## 2025-04-30 NOTE — DISCHARGE INSTRUCTIONS
Discontinue cholesterol lowering medicine  Lipitor if you end up having blurred vision due to eye problem after you have evaluation by eye doctor tomorrow.

## 2025-04-30 NOTE — ED NOTES
ED TO INPATIENT SBAR HANDOFF    Patient Name: Hoang Brandon   Preferred Name: Hoang  : 1960  65 y.o.   Family/Caregiver Present: no   Code Status Order: Full Code  PO Status: NPO:No  Telemetry Order: Yes  C-SSRS: Risk of Suicide: No Risk  Sitter no   Restraints:     Sepsis Risk Score      Situation  Chief Complaint   Patient presents with    Blurred Vision    Hypertension     Brief Description of Patient's Condition: pt resting comfortably in bed.   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         CT HEAD WO CONTRAST   Final Result   1.  No acute intracranial bleed.   2. White matter disease described is typical of microvascular ischemic   disease or as sequela of dysmyelinating/demyelinating processes.   3.  Calcific atherosclerosis of the intracranial vasculature.   Per stroke alert protocol, the results were called by Dr. Suman Stewart to   ARNAV PELLETIER on 2025 at 17:08.         MRI brain without contrast    (Results Pending)     Abnormal labs: Abnormal Labs Reviewed - No abnormal labs to display    Background  Allergies:   Allergies   Allergen Reactions    Amoxicillin Other (See Comments)     Groin area extremely red-\"on fire\"-skin peels off from neck to feet-over 2 weeks    Pcn [Penicillins] Other (See Comments)     Groin area extremely red and skin peels off from neck to feet over 2 weeks     History:   Past Medical History:   Diagnosis Date    A-fib (HCC)     Broken teeth     COVID-19     Hypertension     Kidney stones        Assessment  Vitals: MEWS Score: 1  Level of Consciousness: Alert (0)   Vitals:    25 1618   BP: (!) 173/98   Pulse: 71   Resp: 20   Temp: 98.4 °F (36.9 °C)   TempSrc: Oral   SpO2: 97%   Weight: 89.1 kg (196 lb 6.9 oz)   Height: 1.803 m (5' 11\")

## 2025-04-30 NOTE — CONSULTS
reviewed   General alert, no distress  Eyes: unable to visualize the fundi  Psychiatric: cooperative with examination, no psychotic behavior noted.  Neurologic  Mental status:   orientation to person, place, time.    General fund of knowledge grossly intact   Memory grossly intact   Attention intact as able to attend well to the exam     Language fluent in conversation   Comprehension intact; follows simple commands  Cranial nerves:   CN2: visual fields full   CN 3,4,6: extraocular muscles intact.  PERRLA.   CN5: facial sensation symmetric   CN7: face symmetric without dysarthria  CN8: hearing grossly intact  CN11: trap full strength on shoulder shrug  CN12: tongue midline with protrusion  Strength: good strength in all 4 extremities   Sensory: light touch intact in all 4 extremities.   Cerebellar/coordination: finger nose finger normal without ataxia  Tone: normal in all 4 extremities  Gait: deferred.      Labs  Glucose 88  Na 140  K 3.6  Cl 104  BUN 17  Cr 1.0  Mg 2.20    LDL 69  HgA1c 4.9    ALT 29  AST 31    WBC 5.7  Hg 14.2  Platelets 170    Studies  MRI brain w/o 4/30/25, independently reviewed  IMPRESSION:  Chronic microvascular disease without acute intracranial abnormality.    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    Impression/Recommendations  Monocular (R) vision impairment.    R PCA stenosis.   PAF.    Hypertension.     The patient presents w/ vision impairment in his R eye.  This has improved a bit though remains presents.     Etiology is unclear.     Needs ophthalmology evaluation to help establish a diagnosis.  I would try and expedite this.  If an arterial occlusion is suspected then the patient may need to be anticoagulated.       PCA stenosis is unrelated to symptoms.  For the time being would keep him on low dose asa.      Statin is reasonable.     Would recommend checking an ECHO.     He should not drive until cleared

## 2025-04-30 NOTE — DISCHARGE INSTR - DIET

## 2025-04-30 NOTE — CARE COORDINATION
Chart review revealed pt is from home, ambulatory and UAL in the room. Pt drove here, and has a PCP.   Upon speaking with pt, he confirmed no insurance, and will need natividad medications if the MD orders any.     Discharge Planning:      (CM) reviewed the patient's chart to assess needs. Patient's Readmission Risk Score is   . Patient's medical insurance is  Payor: / .  Patient's PCP is Jesus Hargrove MD .  No needs anticipated, at this time. CM team to follow. Staff to inform CM if additional discharge needs arise.    Pts preferred pharmacy is   "[id number] - [place], [place] - [address] - P [phone] - F [phone]  [address] [facility] [address]  Phone: [phone] Fax: [phone]    [id number] - [place], [place] - [address] [facility] [facility] -  [phone] - F [phone]  [address] [facility] [address]  Phone: [phone] Fax: [phone]    [facility] - [place], [place] - [address] -  [phone] - F [phone]  [address] [facility] [address]  Phone: [phone] Fax: [phone]    [facility] - [place], [place] - [address] [facility] -  [phone] - F [phone]  [address] [facility] [address]  Phone: [phone] Fax: [phone]    [facility] - [place], [place] - [address] [facility] [address] [facility] -  [phone] - F [phone]  [address] [facility] [address]  Phone: [phone] Fax: [phone]       Jorge Siddiqui LMSW, [facility] Social Work Case Management   Phone: [phone]  Fax: [phone]

## 2025-05-01 ENCOUNTER — TELEPHONE (OUTPATIENT)
Dept: FAMILY MEDICINE CLINIC | Age: 65
End: 2025-05-01

## 2025-05-01 NOTE — DISCHARGE SUMMARY
Hospital Medicine Discharge Summary    Patient ID: Hoang Brandon      Patient's PCP: Jesus Hargrove MD    Admit Date: 4/29/2025     Discharge Date: 4/30/2025     Admitting Physician: Aida Baker MD     Discharge Physician: Jorden Delgado MD     Discharge Diagnoses:       Active Hospital Problems    Diagnosis     Blurred vision, right eye [H53.8]        The patient was seen and examined on day of discharge and this discharge summary is in conjunction with any daily progress note from day of discharge.    Hospital Course:     Hoang Brandon is a 65 y.o. male with pmh of hypertension, hypokalemia, history of A-fib not on anticoagulation who presents with right eye blurred vision.  Patient was seen in the room awake alert oriented to person place and situation.  Patient stated this morning around 10:30 AM he was searching something in the dryer suddenly felt right eye blurry vision, things appeared lesley in the right eye, left eye normal he stated.  He denies any eye swelling, recent trauma, itching, watery, pain.  Denies any dizziness, headache, nausea or vomiting.  He states currently vision has improved however he continues to have blurry vision in the right eye.  Denies any difficulty speaking, facial numbness, drooping.  Denies any upper or lower extremity weakness, numbness or tingling.  Denies any history of CAD, stroke.  He stated in 2021 he had COVID developed A-fib he was on Eliquis which was discontinued January 2023.  No history of stroke or MI.  He denies any smoking, drinks a glass of wine every Friday night.  Denies any marijuana or IV drug abuse.    Right eye blurred vision     MRI brain negative for acute intracranial pathology     I discussed with Dr. Porter at [facility]  I also gave Dr. Porter unit clerk number for the [facility] [address]. as per her request  Per Dr. Porter scheduling person will call for outpatient appointment for ophthalmology either today or tomorrow

## 2025-05-01 NOTE — PROGRESS NOTES
[facility] - Occupational Therapy   Phone: [phone]    Occupational Therapy  Unit:[facility] MED SURG    [x] Initial Evaluation            [] Daily Treatment Note         [x] Discharge Summary      Patient: Hoang Brandon   : 1960   MRN: [id number]   Date of Service:  2025    Staff Mobility Recommendation: UAL     AM-PAC Score: 24  Discharge Recommendations: Hoang Brandon scored a  on the AM-PAC ADL Inpatient form.  At this time, no further OT is recommended upon discharge due to patient at independent level.  Recommend patient returns to prior setting with prior services.      DME Required For Discharge: No DME required    Admitting Diagnosis:  Blurred vision, right eye  Ordering Physician: Aida Baker MD   Current Admission Summary: \"65 y.o. male with pmh of hypertension, hypokalemia, history of A-fib not on anticoagulation who presents with right eye blurred vision.\" MRI pending   Past Medical History:  has a past medical history of A-fib (HCC), Broken teeth, COVID-19, Hypertension, and Kidney stones.  Past Surgical History:  has a past surgical history that includes Tonsillectomy; Cardioversion; Cystoscopy (Left, 2022); and Cystoscopy (Left, 2022).      Clinical Assessment: Patient presenting at independent level for completion of required self care tasks for return to home.  Eval with d/c at this time.  No therapy services indicated.       Assessment  Activity Tolerance: Good   Impairments Requiring Therapeutic Intervention: none - eval with same day discharge  Prognosis: good  Precautions/Restrictions: no restrictions  Positional Restrictions:no positional restrictions      Home Environment / Functional History  Lives With: Alone  Type of Home: House  Home Layout: Two level, Bed/Bath upstairs  Home Access: Stairs to enter with rails  Entrance Stairs - Number of Steps: 5  Bathroom Shower/Tub: Tub/Shower unit, Walk-in shower  Bathroom Toilet: Standard  Home 
  [facility] - Physical Therapy   Phone: [phone]    Physical Therapy  Facility/Department:[address] MED SURG    [x] Initial Evaluation            [x] Daily Treatment Note         [] Discharge Summary      Patient: Hoang Brandon   : 1960   MRN: [id number]   Date of Service:  2025  Staff Mobility Recommendation: UAL    AM-PAC score:   Discharge Recommendations: home prn    Admitting Diagnosis: Blurred vision, right eye; CVA R/O; Stenosis of R posterior cerebral artery  Ordering Physician: Aida Baker MD on 25  Current Admission Summary: \"Hoang Brandon is a 65 y.o. male with a pmh of hypertension, hypokalemia who presents with Blurred vision, right eye.\"     Past Medical History:  has a past medical history of A-fib (HCC), Broken teeth, COVID-19, Hypertension, and Kidney stones.  Past Surgical History:  has a past surgical history that includes Tonsillectomy; Cardioversion; Cystoscopy (Left, 2022); and Cystoscopy (Left, 2022).    Assessment  Activity Tolerance: Excellent  Impairments Requiring Therapeutic Intervention: decreased vision  Prognosis: excellent    Clinical Assessment: Pt c/o persistent blurred vision R eye.  No strength deficits noted in LEs, although he does move with slightly decreased step height/length RLE (reports this is chronic).  Denies N/T, and completed unsupported standing activity without difficulty.  He appears to be functioning at his baseline for mobility, and does not require additional therapy in the acute setting.  Anticipate safe return home with prn assist.       DME Required For Discharge: no DME required at discharge        Restrictions:  Precautions/Restrictions: no restrictions  Weight Bearing Restrictions: no restrictions    Required Braces/Orthotics: no braces required  Positional Restrictions:no positional restrictions    Subjective  General: C/o persistent blurred vision.  No weakness.  No pain.   Family/Caregiver present: 
/94 via left arm post Labetalol dose. Dr. Delgado notified via phone. Okay to proceed with discharge. Patient updated.   
4 Eyes Skin Assessment     NAME:  Hoang Brandon  YOB: 1960  MEDICAL RECORD NUMBER:  [id number]    The patient is being assessed for  Admission    I agree that at least one RN has performed a thorough Head to Toe Skin Assessment on the patient. ALL assessment sites listed below have been assessed.      Areas assessed by both nurses:    Head, Face, Ears, Shoulders, Back, Chest, Arms, Elbows, Hands, Sacrum. Buttock, Coccyx, Ischium, Legs. Feet and Heels, and Under Medical Devices         Does the Patient have a Wound? No noted wound(s)       Tor Prevention initiated by RN: No  Wound Care Orders initiated by RN: No    Pressure Injury (Stage 3,4, Unstageable, DTI, NWPT, and Complex wounds) if present, place Wound referral order by RN under : No    New Ostomies, if present place, Ostomy referral order under : No     Nurse 1 eSignature: Electronically signed by Kathy Mike RN on 4/30/25 at 4:06 AM EDT    **SHARE this note so that the co-signing nurse can place an eSignature**    Nurse 2 eSignature: Electronically signed by Jessica Marquez RN on 4/30/25 at 4:25 AM EDT   
A new male pt was admitted from ED at this time, came from home, came with rt eye blurred vision. Pt is an alert and oriented X4. V/S are taken and recorded. Pt is ambulatory. Pt is on RA. NIHSS stroke scale done at the time of handoff with ED nurse at bedside. Pt is an oriented for call light call light within pt's reach. Pt denies for any pain and discomfort. Belongings at bedside. Care plan updated. BP is high. Keep monitoring.  
MRI screening done. Pt is on tele monitoring. BP informed to NP, at the nursing station.  
Patient given verbal and written discharge instructions. Questions answered. Patient with no concerns. Patient says his uncle with provide a ride home. Awaiting for ride. Call light in reach of patient.   
Patient has an appointment at 10 am at [facility] [place] per requested by Dr. Delgado, post hospital follow up on Thursday May 1st, 2025.   
Pharmacy Medication Reconciliation Note     List of medications patient is currently taking is complete.    Source of information:   1. Patient  2. EMR    Notes regarding home medications:   1. Patient reports taking his home morning medication doses prior to arrival.      Michelle Singh, Pharmacy Intern  4/29/2025 7:53 PM    
Pt is discharged. Pt left the hospital at this time.  
RN received the call from Lab for critical results, K+ 2.8. RN informed to the Aida Aggarwal through perfect serve. Ordered for the repeat lab at this time. And RN gave 40 Meq KCL po given as order by Aida GOEL.    Electronically signed by Kathy Mike RN on 4/30/2025 at 6:46 AM   
SLP NOTE    8:31 AM:  Evaluation attempted. Patient unavailable with PT at this time. ST to re-attempt as schedule permits unless otherwise notified. After brief conversation with patient, patient is currently denying concerns re: speech, receptive/expressive/cognitive language, and/or swallowing. If SLP eval/tx is deemed no longer indicated as medical work-up progresses, please discontinue SLP eval/tx order.    10:47 AM:  Patient met at bedside. Patient continues to politely decline needs for SLP eval with report for Brain MRI findings negative for acute abnormality. ST to discontinue evaluation attempts. Please re-consult ST should status change and/or if medical team deems evaluation necessary.    Thank you.  Theresa Marmolejo MA, CCC-SLP, #[id number]  Speech-Language Pathologist  Portable phone: [phone]    
PROVIDED HISTORY: Reason for exam:->Blurry vision of right eye Has a \"code stroke\" or \"stroke alert\" been called?->Yes Decision Support Exception - unselect if not a suspected or confirmed emergency medical condition->Emergency Medical Condition (MA) Reason for Exam: blurred vision FINDINGS: CTA NECK: AORTIC ARCH/ARCH VESSELS: No dissection or arterial injury.  No significant stenosis of the brachiocephalic or subclavian arteries. CAROTID ARTERIES: No dissection, arterial injury, or hemodynamically significant stenosis by NASCET criteria. VERTEBRAL ARTERIES: No dissection, arterial injury, or significant stenosis. SOFT TISSUES: The lung apices are clear.  No cervical or superior mediastinal lymphadenopathy.  The larynx and pharynx are unremarkable.  No acute abnormality of the salivary and thyroid glands. BONES: Disc osteophyte complex at C6-7, causes moderate to severe spinal canal stenosis. CTA HEAD: ANTERIOR CIRCULATION: No significant stenosis of the intracranial internal carotid, anterior cerebral, or middle cerebral arteries. No aneurysm. POSTERIOR CIRCULATION: Focal severe stenosis of the right posterior cerebral artery P3 segment.  The left posterior cerebral artery is patent.  The basilar and cerebellar arteries are patent. OTHER: No dural venous sinus thrombosis on this non-dedicated study. BRAIN: Findings are separately reported.     Severe stenosis of the right posterior cerebral artery.     CT HEAD WO CONTRAST  Result Date: 4/29/2025  EXAMINATION: CT OF THE HEAD WITHOUT CONTRAST  4/29/2025 4:46 pm TECHNIQUE: CT of the head was performed without the administration of intravenous contrast. Automated exposure control, iterative reconstruction, and/or weight based adjustment of the mA/kV was utilized to reduce the radiation dose to as low as reasonably achievable. COMPARISON: None. HISTORY: ORDERING SYSTEM PROVIDED HISTORY: Blurry vision right eye TECHNOLOGIST PROVIDED HISTORY: If patient is on cardiac

## 2025-05-01 NOTE — PLAN OF CARE
Problem: Discharge Planning  Goal: Discharge to home or other facility with appropriate resources  4/30/2025 2009 by Zara Bloom, RN  Outcome: Completed  4/30/2025 1011 by Bette Fleming RN  Outcome: Progressing     Problem: Pain  Goal: Verbalizes/displays adequate comfort level or baseline comfort level  4/30/2025 2009 by Zara Bloom, RN  Outcome: Completed  4/30/2025 1011 by Bette Fleming RN  Outcome: Progressing     Problem: ABCDS Injury Assessment  Goal: Absence of physical injury  4/30/2025 2009 by Zara Bloom, RN  Outcome: Completed  4/30/2025 1011 by Bette Fleming RN  Outcome: Progressing     Problem: Safety - Adult  Goal: Free from fall injury  Outcome: Completed

## 2025-05-02 ENCOUNTER — TELEPHONE (OUTPATIENT)
Dept: FAMILY MEDICINE CLINIC | Age: 65
End: 2025-05-02

## 2025-05-02 NOTE — TELEPHONE ENCOUNTER
Pt was seen at the eye doctor for blurred vision no need to see PCP    Care Transitions Initial Follow Up Call    Outreach made within 2 business days of discharge: Yes    Patient: Hoang Brandon Patient : 1960   MRN: [id number]  Reason for Admission: Blurred Vision  Discharge Date: 25       Spoke with Patient     Discharge department/facility: [facility] Interactive Patient Contact:  Was patient able to fill all prescriptions: Yes  Was patient instructed to bring all medications to the follow-up visit: Yes  Is patient taking all medications as directed in the discharge summary? Yes  Does patient understand their discharge instructions: Yes  Does patient have questions or concerns that need addressed prior to 7-14 day follow up office visit: yes -     Additional needs identified to be addressed with provider  No needs identified             Scheduled appointment with PCP within 7-14 days    Follow Up  Future Appointments   Date Time Provider Department Center   2025  1:30 PM Deejay Chin MD [facility] Donna Ison, MA

## 2025-07-16 PROBLEM — E78.49 OTHER HYPERLIPIDEMIA: Status: ACTIVE | Noted: 2025-07-16

## 2025-07-16 PROBLEM — I15.2 HYPERTENSION DUE TO ENDOCRINE DISORDER: Status: RESOLVED | Noted: 2018-01-19 | Resolved: 2025-07-16

## 2025-07-16 PROBLEM — I48.91 ATRIAL FIBRILLATION WITH RAPID VENTRICULAR RESPONSE (HCC): Status: RESOLVED | Noted: 2021-11-13 | Resolved: 2025-07-16

## 2025-07-27 NOTE — PROGRESS NOTES
Hoang Brandon (:  1960) is a 65 y.o. male,Established patient, here for evaluation of the following chief complaint(s):  Established New Doctor (Former Dr. Hargrove Patient /) and Check-Up (Hypertension, lipids)      Assessment & Plan  Persistent atrial fibrillation (HCC)   Monitored by specialist- no acute findings meriting change in the plan    Orders:    CBC with Auto Differential; Future    Left atrial flutter by electrocardiogram (HCC)   Monitored by specialist- no acute findings meriting change in the plan         Essential hypertension   Chronic, not at goal (unstable), recommend starting at amlodipine 5 mg every day coming in for nurse blood pressure check in 2 week. and medication adherence emphasized    Orders:    Basic Metabolic Panel; Future    Other hyperlipidemia   Not on statin.  Last lipid panel done earlier this year was up within normal limits.         Hypomagnesemia   Chronic, at goal (stable), continue current plan pending work up below    Orders:    Magnesium; Future    Screening for prostate cancer      Orders:    PSA Screening; Future    Hypokalemia   Chronic, at goal (stable), patient currently on Aldactone and potassium supplementation.  At one point patient's potassium levels were down to 1.9.  Did see nephrology in the hospital but has not followed up as outpatient.  Discussed the importance of following up with nephrology but patient declined.  Will recheck levels today.           Return in about 6 months (around 2026) for Follow up for chronic medical conditions, annual wellness visit.    Subjective       HPI    A flutter/a fib-Pt follows with Dr Chin. [id number] is 1. Pt is on asa. Mild palpitations--no associated lightheadedness.     HTN-Pt is on amlodipine. Pt is not compliant with meds.  Was supposed to be taking this 2 times a day but admits to only taking 1 dose a day every so often.    Why is pt on aldactone--pt was started on for hypokalemia.  Pt has been on this

## 2025-07-27 NOTE — ASSESSMENT & PLAN NOTE
Chronic, not at goal (unstable), recommend starting at amlodipine 5 mg every day coming in for nurse blood pressure check in 2 week. and medication adherence emphasized    Orders:    Basic Metabolic Panel; Future

## 2025-07-28 ENCOUNTER — OFFICE VISIT (OUTPATIENT)
Dept: FAMILY MEDICINE CLINIC | Age: 65
End: 2025-07-28

## 2025-07-28 VITALS
OXYGEN SATURATION: 96 % | HEART RATE: 65 BPM | WEIGHT: 200 LBS | DIASTOLIC BLOOD PRESSURE: 82 MMHG | SYSTOLIC BLOOD PRESSURE: 136 MMHG | BODY MASS INDEX: 28 KG/M2 | TEMPERATURE: 97.7 F | HEIGHT: 71 IN

## 2025-07-28 DIAGNOSIS — E87.6 HYPOKALEMIA: ICD-10-CM

## 2025-07-28 DIAGNOSIS — I48.19 PERSISTENT ATRIAL FIBRILLATION (HCC): Primary | ICD-10-CM

## 2025-07-28 DIAGNOSIS — I48.92 LEFT ATRIAL FLUTTER BY ELECTROCARDIOGRAM (HCC): ICD-10-CM

## 2025-07-28 DIAGNOSIS — E83.42 HYPOMAGNESEMIA: ICD-10-CM

## 2025-07-28 DIAGNOSIS — I10 ESSENTIAL HYPERTENSION: ICD-10-CM

## 2025-07-28 DIAGNOSIS — E78.49 OTHER HYPERLIPIDEMIA: ICD-10-CM

## 2025-07-28 DIAGNOSIS — Z12.5 SCREENING FOR PROSTATE CANCER: ICD-10-CM

## 2025-07-28 PROCEDURE — 3075F SYST BP GE 130 - 139MM HG: CPT | Performed by: INTERNAL MEDICINE

## 2025-07-28 PROCEDURE — 3079F DIAST BP 80-89 MM HG: CPT | Performed by: INTERNAL MEDICINE

## 2025-07-28 PROCEDURE — 99212 OFFICE O/P EST SF 10 MIN: CPT | Performed by: INTERNAL MEDICINE

## 2025-07-28 RX ORDER — AMLODIPINE BESYLATE 5 MG/1
5 TABLET ORAL DAILY
Qty: 90 TABLET | Refills: 1 | Status: SHIPPED | OUTPATIENT
Start: 2025-07-28

## 2025-07-28 ASSESSMENT — ENCOUNTER SYMPTOMS
BLOOD IN STOOL: 0
SHORTNESS OF BREATH: 0
DIARRHEA: 0
ABDOMINAL PAIN: 0
COUGH: 0
CONSTIPATION: 1
WHEEZING: 0

## 2025-07-28 NOTE — PATIENT INSTRUCTIONS
Take amlodipine one time daily and recheck    Compression stocking to help with swelling.   Will check blood work

## 2025-07-30 DIAGNOSIS — I10 ESSENTIAL HYPERTENSION: ICD-10-CM

## 2025-07-30 DIAGNOSIS — Z12.5 SCREENING FOR PROSTATE CANCER: ICD-10-CM

## 2025-07-30 DIAGNOSIS — E83.42 HYPOMAGNESEMIA: ICD-10-CM

## 2025-07-30 DIAGNOSIS — I48.19 PERSISTENT ATRIAL FIBRILLATION (HCC): ICD-10-CM

## 2025-07-31 LAB
ANION GAP SERPL CALCULATED.3IONS-SCNC: 9 MMOL/L (ref 3–16)
BASOPHILS # BLD: 0.1 K/UL (ref 0–0.2)
BASOPHILS NFR BLD: 1.4 %
BUN SERPL-MCNC: 16 MG/DL (ref 7–20)
CALCIUM SERPL-MCNC: 9.1 MG/DL (ref 8.3–10.6)
CHLORIDE SERPL-SCNC: 103 MMOL/L (ref 99–110)
CO2 SERPL-SCNC: 31 MMOL/L (ref 21–32)
CREAT SERPL-MCNC: 0.9 MG/DL (ref 0.8–1.3)
DEPRECATED RDW RBC AUTO: 14.1 % (ref 12.4–15.4)
EOSINOPHIL # BLD: 0.2 K/UL (ref 0–0.6)
EOSINOPHIL NFR BLD: 3.1 %
GFR SERPLBLD CREATININE-BSD FMLA CKD-EPI: >90 ML/MIN/{1.73_M2}
GLUCOSE SERPL-MCNC: 86 MG/DL (ref 70–99)
HCT VFR BLD AUTO: 39.6 % (ref 40.5–52.5)
HGB BLD-MCNC: 13.6 G/DL (ref 13.5–17.5)
LYMPHOCYTES # BLD: 1.3 K/UL (ref 1–5.1)
LYMPHOCYTES NFR BLD: 25.8 %
MAGNESIUM SERPL-MCNC: 2.1 MG/DL (ref 1.8–2.4)
MCH RBC QN AUTO: 28.6 PG (ref 26–34)
MCHC RBC AUTO-ENTMCNC: 34.4 G/DL (ref 31–36)
MCV RBC AUTO: 83 FL (ref 80–100)
MONOCYTES # BLD: 0.6 K/UL (ref 0–1.3)
MONOCYTES NFR BLD: 11.8 %
NEUTROPHILS # BLD: 3 K/UL (ref 1.7–7.7)
NEUTROPHILS NFR BLD: 57.9 %
PLATELET # BLD AUTO: 179 K/UL (ref 135–450)
PMV BLD AUTO: 10.5 FL (ref 5–10.5)
POTASSIUM SERPL-SCNC: 3.6 MMOL/L (ref 3.5–5.1)
PSA SERPL DL<=0.01 NG/ML-MCNC: 3.4 NG/ML (ref 0–4)
RBC # BLD AUTO: 4.77 M/UL (ref 4.2–5.9)
SODIUM SERPL-SCNC: 143 MMOL/L (ref 136–145)
WBC # BLD AUTO: 5.1 K/UL (ref 4–11)

## 2025-08-04 ENCOUNTER — OFFICE VISIT (OUTPATIENT)
Dept: CARDIOLOGY CLINIC | Age: 65
End: 2025-08-04

## 2025-08-04 VITALS
SYSTOLIC BLOOD PRESSURE: 120 MMHG | BODY MASS INDEX: 27.44 KG/M2 | DIASTOLIC BLOOD PRESSURE: 80 MMHG | WEIGHT: 196 LBS | HEART RATE: 66 BPM | HEIGHT: 71 IN | OXYGEN SATURATION: 98 %

## 2025-08-04 DIAGNOSIS — I48.19 PERSISTENT ATRIAL FIBRILLATION (HCC): Primary | ICD-10-CM

## 2025-08-04 DIAGNOSIS — I10 ESSENTIAL HYPERTENSION: ICD-10-CM

## 2025-08-04 DIAGNOSIS — I48.4 ATYPICAL ATRIAL FLUTTER (HCC): ICD-10-CM

## 2025-08-04 PROBLEM — E78.49 OTHER HYPERLIPIDEMIA: Status: RESOLVED | Noted: 2025-07-16 | Resolved: 2025-08-04

## 2025-08-04 PROCEDURE — 99214 OFFICE O/P EST MOD 30 MIN: CPT

## 2025-08-04 PROCEDURE — 3074F SYST BP LT 130 MM HG: CPT

## 2025-08-04 PROCEDURE — 93000 ELECTROCARDIOGRAM COMPLETE: CPT

## 2025-08-04 PROCEDURE — 3079F DIAST BP 80-89 MM HG: CPT

## 2025-08-04 PROCEDURE — G2211 COMPLEX E/M VISIT ADD ON: HCPCS

## 2025-08-11 ENCOUNTER — CLINICAL SUPPORT (OUTPATIENT)
Dept: FAMILY MEDICINE CLINIC | Age: 65
End: 2025-08-11

## 2025-08-11 VITALS — DIASTOLIC BLOOD PRESSURE: 84 MMHG | SYSTOLIC BLOOD PRESSURE: 128 MMHG

## 2025-08-11 DIAGNOSIS — I10 ESSENTIAL HYPERTENSION: Primary | ICD-10-CM

## 2025-08-11 PROCEDURE — 2000F BLOOD PRESSURE MEASURE: CPT | Performed by: INTERNAL MEDICINE

## 2025-08-22 RX ORDER — SPIRONOLACTONE 25 MG/1
25 TABLET ORAL DAILY
Qty: 90 TABLET | Refills: 1 | Status: SHIPPED | OUTPATIENT
Start: 2025-08-22

## (undated) DEVICE — 3M™ STERI-STRIP™ COMPOUND BENZOIN TINCTURE 40 BAGS/CARTON 4 CARTONS/CASE C1544: Brand: 3M™ STERI-STRIP™

## (undated) DEVICE — GUIDEWIRE URO L150CM DIA0.035IN TAPR 8CM STR TIP STD SHFT

## (undated) DEVICE — SOLUTION IV IRRIG WATER 1000ML POUR BRL 2F7114

## (undated) DEVICE — LASER SURGICAL FIBER HIGH WATTAGE CASE HOLMIUM

## (undated) DEVICE — Device

## (undated) DEVICE — SEAL ENDOSCP INSTR BX PRT FOR ACC UPTO 3FR

## (undated) DEVICE — NITINOL STONE RETRIEVAL BASKET: Brand: ZERO TIP

## (undated) DEVICE — SYRINGE MED 10ML LUERLOCK TIP W/O SFTY DISP

## (undated) DEVICE — GLOVE SURG SZ 65 CRM LTX FREE POLYISOPRENE POLYMER BEAD ANTI

## (undated) DEVICE — GOWN SIRUS NONREIN XL W/TWL: Brand: MEDLINE INDUSTRIES, INC.

## (undated) DEVICE — SOLUTION IRRIG 3000ML STRL H2O USP UROMATIC PLAS CONT

## (undated) DEVICE — STRIP,CLOSURE,WOUND,MEDI-STRIP,1/2X4: Brand: MEDLINE

## (undated) DEVICE — SINGLE ACTION PUMPING SYSTEM

## (undated) DEVICE — GUIDEWIRE ENDOSCP L150CM DIA0.038IN INTRO 14FR TIP 3CM S

## (undated) DEVICE — CYSTOSCOPY: Brand: MEDLINE INDUSTRIES, INC.